# Patient Record
Sex: FEMALE | Race: WHITE | Employment: OTHER | URBAN - METROPOLITAN AREA
[De-identification: names, ages, dates, MRNs, and addresses within clinical notes are randomized per-mention and may not be internally consistent; named-entity substitution may affect disease eponyms.]

---

## 2017-03-06 ENCOUNTER — HOSPITAL ENCOUNTER (OUTPATIENT)
Dept: MRI IMAGING | Age: 67
Discharge: HOME | End: 2017-03-06
Attending: NEUROLOGICAL SURGERY
Payer: MEDICARE

## 2017-03-06 DIAGNOSIS — M48.061 SPINAL STENOSIS, LUMBAR REGION, WITHOUT NEUROGENIC CLAUDICATION: ICD-10-CM

## 2017-03-06 PROCEDURE — 72148 MRI LUMBAR SPINE W/O DYE: CPT

## 2017-06-07 ENCOUNTER — HOSPITAL ENCOUNTER (OUTPATIENT)
Dept: PREADMISSION TESTING | Age: 67
Discharge: HOME | End: 2017-06-07
Payer: MEDICARE

## 2017-06-07 VITALS
RESPIRATION RATE: 20 BRPM | BODY MASS INDEX: 32.49 KG/M2 | HEIGHT: 65 IN | DIASTOLIC BLOOD PRESSURE: 106 MMHG | WEIGHT: 195 LBS | SYSTOLIC BLOOD PRESSURE: 181 MMHG | HEART RATE: 84 BPM | TEMPERATURE: 98.6 F

## 2017-06-07 LAB
ABO + RH BLD: NORMAL
ANION GAP BLD CALC-SCNC: 9 MMOL/L (ref 5–15)
APTT PPP: 28 SEC (ref 22.1–32.5)
ATRIAL RATE: 72 BPM
BASOPHILS # BLD AUTO: 0.1 K/UL (ref 0–0.1)
BASOPHILS # BLD: 1 % (ref 0–1)
BLOOD GROUP ANTIBODIES SERPL: NORMAL
BUN SERPL-MCNC: 17 MG/DL (ref 6–20)
BUN/CREAT SERPL: 17 (ref 12–20)
CALCIUM SERPL-MCNC: 10 MG/DL (ref 8.5–10.1)
CALCULATED P AXIS, ECG09: -26 DEGREES
CALCULATED R AXIS, ECG10: 0 DEGREES
CALCULATED T AXIS, ECG11: -13 DEGREES
CHLORIDE SERPL-SCNC: 105 MMOL/L (ref 97–108)
CO2 SERPL-SCNC: 26 MMOL/L (ref 21–32)
CREAT SERPL-MCNC: 1.01 MG/DL (ref 0.55–1.02)
DIAGNOSIS, 93000: NORMAL
EOSINOPHIL # BLD: 0.2 K/UL (ref 0–0.4)
EOSINOPHIL NFR BLD: 3 % (ref 0–7)
ERYTHROCYTE [DISTWIDTH] IN BLOOD BY AUTOMATED COUNT: 13.7 % (ref 11.5–14.5)
EST. AVERAGE GLUCOSE BLD GHB EST-MCNC: 189 MG/DL
GLUCOSE SERPL-MCNC: 191 MG/DL (ref 65–100)
HBA1C MFR BLD: 8.2 % (ref 4.2–6.3)
HCT VFR BLD AUTO: 44.1 % (ref 35–47)
HGB BLD-MCNC: 14.7 G/DL (ref 11.5–16)
INR PPP: 1 (ref 0.9–1.1)
LYMPHOCYTES # BLD AUTO: 26 % (ref 12–49)
LYMPHOCYTES # BLD: 2 K/UL (ref 0.8–3.5)
MCH RBC QN AUTO: 30.5 PG (ref 26–34)
MCHC RBC AUTO-ENTMCNC: 33.3 G/DL (ref 30–36.5)
MCV RBC AUTO: 91.5 FL (ref 80–99)
MONOCYTES # BLD: 0.7 K/UL (ref 0–1)
MONOCYTES NFR BLD AUTO: 10 % (ref 5–13)
NEUTS SEG # BLD: 4.7 K/UL (ref 1.8–8)
NEUTS SEG NFR BLD AUTO: 60 % (ref 32–75)
P-R INTERVAL, ECG05: 130 MS
PLATELET # BLD AUTO: 327 K/UL (ref 150–400)
POTASSIUM SERPL-SCNC: 3.8 MMOL/L (ref 3.5–5.1)
PROTHROMBIN TIME: 10.4 SEC (ref 9–11.1)
Q-T INTERVAL, ECG07: 404 MS
QRS DURATION, ECG06: 80 MS
QTC CALCULATION (BEZET), ECG08: 442 MS
RBC # BLD AUTO: 4.82 M/UL (ref 3.8–5.2)
SODIUM SERPL-SCNC: 140 MMOL/L (ref 136–145)
SPECIMEN EXP DATE BLD: NORMAL
THERAPEUTIC RANGE,PTTT: NORMAL SECS (ref 58–77)
VENTRICULAR RATE, ECG03: 72 BPM
WBC # BLD AUTO: 7.6 K/UL (ref 3.6–11)

## 2017-06-07 PROCEDURE — 85730 THROMBOPLASTIN TIME PARTIAL: CPT | Performed by: NEUROLOGICAL SURGERY

## 2017-06-07 PROCEDURE — 93005 ELECTROCARDIOGRAM TRACING: CPT

## 2017-06-07 PROCEDURE — 36415 COLL VENOUS BLD VENIPUNCTURE: CPT | Performed by: NEUROLOGICAL SURGERY

## 2017-06-07 PROCEDURE — 83036 HEMOGLOBIN GLYCOSYLATED A1C: CPT | Performed by: NEUROLOGICAL SURGERY

## 2017-06-07 PROCEDURE — 85025 COMPLETE CBC W/AUTO DIFF WBC: CPT | Performed by: NEUROLOGICAL SURGERY

## 2017-06-07 PROCEDURE — 85610 PROTHROMBIN TIME: CPT | Performed by: NEUROLOGICAL SURGERY

## 2017-06-07 PROCEDURE — 80048 BASIC METABOLIC PNL TOTAL CA: CPT | Performed by: NEUROLOGICAL SURGERY

## 2017-06-07 PROCEDURE — 86900 BLOOD TYPING SEROLOGIC ABO: CPT | Performed by: NEUROLOGICAL SURGERY

## 2017-06-07 RX ORDER — GLIPIZIDE 10 MG/1
20 TABLET ORAL DAILY
COMMUNITY
End: 2022-03-04

## 2017-06-07 RX ORDER — MELOXICAM 15 MG/1
15 TABLET ORAL AS NEEDED
COMMUNITY
End: 2017-06-22

## 2017-06-07 RX ORDER — CLONIDINE HYDROCHLORIDE 0.1 MG/1
0.1 TABLET ORAL 2 TIMES DAILY
COMMUNITY

## 2017-06-07 RX ORDER — LOSARTAN POTASSIUM AND HYDROCHLOROTHIAZIDE 25; 100 MG/1; MG/1
1 TABLET ORAL DAILY
COMMUNITY
End: 2022-03-04

## 2017-06-07 RX ORDER — METOPROLOL SUCCINATE 50 MG/1
50 TABLET, EXTENDED RELEASE ORAL DAILY
COMMUNITY

## 2017-06-07 RX ORDER — NAPROXEN SODIUM 220 MG
220 TABLET ORAL 2 TIMES DAILY WITH MEALS
COMMUNITY
End: 2017-06-22

## 2017-06-07 RX ORDER — RANITIDINE 150 MG/1
150 TABLET, FILM COATED ORAL DAILY
COMMUNITY
End: 2022-03-04

## 2017-06-07 NOTE — PERIOP NOTES
PATIENT GIVEN SURGICAL SITE INFECTION FAQ HANDOUT AND HAND WASHING TIP SHEET. PREOP INSTRUCTIONS REVIEWED AND PATIENT VERBALIZES UNDERSTANDING OF INSTRUCTIONS. PATIENT HAS BEEN GIVEN THE OPPORTUNITY TO ASK QUESTIONS. CHG WIPES C INSTRUCTIONS GIVEN TO PT.  PT'S BP WAS FROM 181/106 AS HIGH /121- DR. PIKE PT'S PRIMARY DRTani HAS BEEN CALLED. HIS OFFICE IS TO RETURN A CALL TO PAT.  AS TO WHAT THEY WANT TO DO.

## 2017-06-08 LAB
BACTERIA SPEC CULT: ABNORMAL
BACTERIA SPEC CULT: ABNORMAL
SERVICE CMNT-IMP: ABNORMAL

## 2017-06-08 NOTE — PERIOP NOTES
PT TO GO SEE DR. PIKE THIS AFTERNOON  REGARDING BP.  SPOKE DONNIE BOBO REGARDING PT'S BP AND A1C OF 8.2

## 2017-06-17 ENCOUNTER — ANESTHESIA EVENT (OUTPATIENT)
Dept: SURGERY | Age: 67
DRG: 460 | End: 2017-06-17
Payer: MEDICARE

## 2017-06-19 ENCOUNTER — ANESTHESIA (OUTPATIENT)
Dept: SURGERY | Age: 67
DRG: 460 | End: 2017-06-19
Payer: MEDICARE

## 2017-06-19 ENCOUNTER — HOSPITAL ENCOUNTER (INPATIENT)
Age: 67
LOS: 3 days | Discharge: NURSING, REHAB OR LONG-TERM CARE | DRG: 460 | End: 2017-06-22
Attending: NEUROLOGICAL SURGERY | Admitting: NEUROLOGICAL SURGERY
Payer: MEDICARE

## 2017-06-19 ENCOUNTER — APPOINTMENT (OUTPATIENT)
Dept: GENERAL RADIOLOGY | Age: 67
DRG: 460 | End: 2017-06-19
Attending: NEUROLOGICAL SURGERY
Payer: MEDICARE

## 2017-06-19 ENCOUNTER — APPOINTMENT (OUTPATIENT)
Dept: GENERAL RADIOLOGY | Age: 67
DRG: 460 | End: 2017-06-19
Attending: NURSE PRACTITIONER
Payer: MEDICARE

## 2017-06-19 PROBLEM — M53.2X6 SPINAL INSTABILITIES OF LUMBAR REGION: Status: ACTIVE | Noted: 2017-06-19

## 2017-06-19 LAB
ERYTHROCYTE [DISTWIDTH] IN BLOOD BY AUTOMATED COUNT: 13.8 % (ref 11.5–14.5)
GLUCOSE BLD STRIP.AUTO-MCNC: 166 MG/DL (ref 65–100)
GLUCOSE BLD STRIP.AUTO-MCNC: 166 MG/DL (ref 65–100)
GLUCOSE BLD STRIP.AUTO-MCNC: 270 MG/DL (ref 65–100)
HCT VFR BLD AUTO: 39.6 % (ref 35–47)
HGB BLD-MCNC: 13 G/DL (ref 11.5–16)
MCH RBC QN AUTO: 30.3 PG (ref 26–34)
MCHC RBC AUTO-ENTMCNC: 32.8 G/DL (ref 30–36.5)
MCV RBC AUTO: 92.3 FL (ref 80–99)
PLATELET # BLD AUTO: 342 K/UL (ref 150–400)
RBC # BLD AUTO: 4.29 M/UL (ref 3.8–5.2)
SERVICE CMNT-IMP: ABNORMAL
WBC # BLD AUTO: 15.2 K/UL (ref 3.6–11)

## 2017-06-19 PROCEDURE — 74011250636 HC RX REV CODE- 250/636: Performed by: NURSE PRACTITIONER

## 2017-06-19 PROCEDURE — 74011250636 HC RX REV CODE- 250/636: Performed by: NEUROLOGICAL SURGERY

## 2017-06-19 PROCEDURE — 0SG00AJ FUSION OF LUMBAR VERTEBRAL JOINT WITH INTERBODY FUSION DEVICE, POSTERIOR APPROACH, ANTERIOR COLUMN, OPEN APPROACH: ICD-10-PCS | Performed by: NEUROLOGICAL SURGERY

## 2017-06-19 PROCEDURE — 74011000250 HC RX REV CODE- 250

## 2017-06-19 PROCEDURE — 77030012890

## 2017-06-19 PROCEDURE — 74011000250 HC RX REV CODE- 250: Performed by: NEUROLOGICAL SURGERY

## 2017-06-19 PROCEDURE — 01NB0ZZ RELEASE LUMBAR NERVE, OPEN APPROACH: ICD-10-PCS | Performed by: NEUROLOGICAL SURGERY

## 2017-06-19 PROCEDURE — C1713 ANCHOR/SCREW BN/BN,TIS/BN: HCPCS | Performed by: NEUROLOGICAL SURGERY

## 2017-06-19 PROCEDURE — 74000 XR ABD (KUB): CPT

## 2017-06-19 PROCEDURE — 00QT0ZZ REPAIR SPINAL MENINGES, OPEN APPROACH: ICD-10-PCS | Performed by: NEUROLOGICAL SURGERY

## 2017-06-19 PROCEDURE — 74011000272 HC RX REV CODE- 272: Performed by: NEUROLOGICAL SURGERY

## 2017-06-19 PROCEDURE — 77030013079 HC BLNKT BAIR HGGR 3M -A: Performed by: ANESTHESIOLOGY

## 2017-06-19 PROCEDURE — 77030026438 HC STYL ET INTUB CARD -A: Performed by: ANESTHESIOLOGY

## 2017-06-19 PROCEDURE — 36415 COLL VENOUS BLD VENIPUNCTURE: CPT | Performed by: NEUROLOGICAL SURGERY

## 2017-06-19 PROCEDURE — 77030012406 HC DRN WND PENRS BARD -A: Performed by: NEUROLOGICAL SURGERY

## 2017-06-19 PROCEDURE — 77030002933 HC SUT MCRYL J&J -A: Performed by: NEUROLOGICAL SURGERY

## 2017-06-19 PROCEDURE — 77030004391 HC BUR FLUT MEDT -C: Performed by: NEUROLOGICAL SURGERY

## 2017-06-19 PROCEDURE — 74011000250 HC RX REV CODE- 250: Performed by: NURSE PRACTITIONER

## 2017-06-19 PROCEDURE — 77030020782 HC GWN BAIR PAWS FLX 3M -B

## 2017-06-19 PROCEDURE — 77030012035 HC APPL SEAL MICROMYST KT INLC -C: Performed by: NEUROLOGICAL SURGERY

## 2017-06-19 PROCEDURE — 77030008684 HC TU ET CUF COVD -B: Performed by: ANESTHESIOLOGY

## 2017-06-19 PROCEDURE — 65270000029 HC RM PRIVATE

## 2017-06-19 PROCEDURE — 77030034850: Performed by: NEUROLOGICAL SURGERY

## 2017-06-19 PROCEDURE — 82962 GLUCOSE BLOOD TEST: CPT

## 2017-06-19 PROCEDURE — 76060000039 HC ANESTHESIA 4 TO 4.5 HR: Performed by: NEUROLOGICAL SURGERY

## 2017-06-19 PROCEDURE — 77030003194 HC GRFT RECOMB BN MEDT -I1: Performed by: NEUROLOGICAL SURGERY

## 2017-06-19 PROCEDURE — 74011250637 HC RX REV CODE- 250/637: Performed by: NEUROLOGICAL SURGERY

## 2017-06-19 PROCEDURE — 77030013951 HC SEAL TISS ADH DURASL KT INLC -G1: Performed by: NEUROLOGICAL SURGERY

## 2017-06-19 PROCEDURE — 74011250636 HC RX REV CODE- 250/636: Performed by: ANESTHESIOLOGY

## 2017-06-19 PROCEDURE — 77030011836 HC SUT GORTX WLGO -A: Performed by: NEUROLOGICAL SURGERY

## 2017-06-19 PROCEDURE — 77030012602 HC SPNG PTTY NEUR J&J -B: Performed by: NEUROLOGICAL SURGERY

## 2017-06-19 PROCEDURE — 76010000175 HC OR TIME 4 TO 4.5 HR INTENSV-TIER 1: Performed by: NEUROLOGICAL SURGERY

## 2017-06-19 PROCEDURE — 76210000017 HC OR PH I REC 1.5 TO 2 HR: Performed by: NEUROLOGICAL SURGERY

## 2017-06-19 PROCEDURE — 77030034475 HC MISC IMPL SPN: Performed by: NEUROLOGICAL SURGERY

## 2017-06-19 PROCEDURE — 74011250636 HC RX REV CODE- 250/636

## 2017-06-19 PROCEDURE — 77030003029 HC SUT VCRL J&J -B: Performed by: NEUROLOGICAL SURGERY

## 2017-06-19 PROCEDURE — 77030014647 HC SEAL FBRN TISSL BAXT -D: Performed by: NEUROLOGICAL SURGERY

## 2017-06-19 PROCEDURE — 77030032490 HC SLV COMPR SCD KNE COVD -B: Performed by: NEUROLOGICAL SURGERY

## 2017-06-19 PROCEDURE — 0ST20ZZ RESECTION OF LUMBAR VERTEBRAL DISC, OPEN APPROACH: ICD-10-PCS | Performed by: NEUROLOGICAL SURGERY

## 2017-06-19 PROCEDURE — 85027 COMPLETE CBC AUTOMATED: CPT | Performed by: NEUROLOGICAL SURGERY

## 2017-06-19 PROCEDURE — 74011000250 HC RX REV CODE- 250: Performed by: ANESTHESIOLOGY

## 2017-06-19 PROCEDURE — 77030010813: Performed by: NEUROLOGICAL SURGERY

## 2017-06-19 PROCEDURE — 77030029099 HC BN WAX SSPC -A: Performed by: NEUROLOGICAL SURGERY

## 2017-06-19 DEVICE — BONE GRAFT KIT 7510200 INFUSE SMALL
Type: IMPLANTABLE DEVICE | Site: SPINE LUMBAR | Status: FUNCTIONAL
Brand: INFUSE® BONE GRAFT

## 2017-06-19 DEVICE — ROD 1553201035 5.5 TI CP4 NS CURV 35MM
Type: IMPLANTABLE DEVICE | Site: SPINE LUMBAR | Status: FUNCTIONAL
Brand: CD HORIZON® SPINAL SYSTEM

## 2017-06-19 RX ORDER — DEXTROSE 50 % IN WATER (D50W) INTRAVENOUS SYRINGE
12.5-25 AS NEEDED
Status: DISCONTINUED | OUTPATIENT
Start: 2017-06-19 | End: 2017-06-19 | Stop reason: CLARIF

## 2017-06-19 RX ORDER — SODIUM CHLORIDE, SODIUM LACTATE, POTASSIUM CHLORIDE, CALCIUM CHLORIDE 600; 310; 30; 20 MG/100ML; MG/100ML; MG/100ML; MG/100ML
INJECTION, SOLUTION INTRAVENOUS
Status: DISCONTINUED | OUTPATIENT
Start: 2017-06-19 | End: 2017-06-19 | Stop reason: HOSPADM

## 2017-06-19 RX ORDER — SODIUM CHLORIDE 9 MG/ML
1000 INJECTION, SOLUTION INTRAVENOUS CONTINUOUS
Status: DISCONTINUED | OUTPATIENT
Start: 2017-06-19 | End: 2017-06-19 | Stop reason: HOSPADM

## 2017-06-19 RX ORDER — LIDOCAINE HYDROCHLORIDE 10 MG/ML
0.1 INJECTION, SOLUTION EPIDURAL; INFILTRATION; INTRACAUDAL; PERINEURAL AS NEEDED
Status: DISCONTINUED | OUTPATIENT
Start: 2017-06-19 | End: 2017-06-19 | Stop reason: HOSPADM

## 2017-06-19 RX ORDER — ACETAMINOPHEN 325 MG/1
650 TABLET ORAL
Status: DISCONTINUED | OUTPATIENT
Start: 2017-06-19 | End: 2017-06-22 | Stop reason: HOSPADM

## 2017-06-19 RX ORDER — MIDAZOLAM HYDROCHLORIDE 1 MG/ML
INJECTION, SOLUTION INTRAMUSCULAR; INTRAVENOUS AS NEEDED
Status: DISCONTINUED | OUTPATIENT
Start: 2017-06-19 | End: 2017-06-19 | Stop reason: HOSPADM

## 2017-06-19 RX ORDER — SODIUM CHLORIDE, SODIUM LACTATE, POTASSIUM CHLORIDE, CALCIUM CHLORIDE 600; 310; 30; 20 MG/100ML; MG/100ML; MG/100ML; MG/100ML
25 INJECTION, SOLUTION INTRAVENOUS CONTINUOUS
Status: DISCONTINUED | OUTPATIENT
Start: 2017-06-19 | End: 2017-06-19 | Stop reason: HOSPADM

## 2017-06-19 RX ORDER — LIDOCAINE HYDROCHLORIDE 20 MG/ML
INJECTION, SOLUTION EPIDURAL; INFILTRATION; INTRACAUDAL; PERINEURAL AS NEEDED
Status: DISCONTINUED | OUTPATIENT
Start: 2017-06-19 | End: 2017-06-19 | Stop reason: HOSPADM

## 2017-06-19 RX ORDER — SODIUM CHLORIDE 0.9 % (FLUSH) 0.9 %
5-10 SYRINGE (ML) INJECTION EVERY 8 HOURS
Status: DISCONTINUED | OUTPATIENT
Start: 2017-06-19 | End: 2017-06-19 | Stop reason: HOSPADM

## 2017-06-19 RX ORDER — HYDRALAZINE HYDROCHLORIDE 20 MG/ML
10 INJECTION INTRAMUSCULAR; INTRAVENOUS
Status: DISCONTINUED | OUTPATIENT
Start: 2017-06-19 | End: 2017-06-22 | Stop reason: HOSPADM

## 2017-06-19 RX ORDER — METOPROLOL SUCCINATE 50 MG/1
50 TABLET, EXTENDED RELEASE ORAL DAILY
Status: DISCONTINUED | OUTPATIENT
Start: 2017-06-20 | End: 2017-06-22 | Stop reason: HOSPADM

## 2017-06-19 RX ORDER — ONDANSETRON 2 MG/ML
4 INJECTION INTRAMUSCULAR; INTRAVENOUS AS NEEDED
Status: DISCONTINUED | OUTPATIENT
Start: 2017-06-19 | End: 2017-06-19 | Stop reason: HOSPADM

## 2017-06-19 RX ORDER — CEFAZOLIN SODIUM IN 0.9 % NACL 2 G/50 ML
2 INTRAVENOUS SOLUTION, PIGGYBACK (ML) INTRAVENOUS
Status: COMPLETED | OUTPATIENT
Start: 2017-06-19 | End: 2017-06-19

## 2017-06-19 RX ORDER — SODIUM CHLORIDE 0.9 % (FLUSH) 0.9 %
5-10 SYRINGE (ML) INJECTION AS NEEDED
Status: DISCONTINUED | OUTPATIENT
Start: 2017-06-19 | End: 2017-06-22 | Stop reason: HOSPADM

## 2017-06-19 RX ORDER — HYDROMORPHONE HYDROCHLORIDE 2 MG/ML
INJECTION, SOLUTION INTRAMUSCULAR; INTRAVENOUS; SUBCUTANEOUS AS NEEDED
Status: DISCONTINUED | OUTPATIENT
Start: 2017-06-19 | End: 2017-06-19 | Stop reason: HOSPADM

## 2017-06-19 RX ORDER — MIDAZOLAM HYDROCHLORIDE 1 MG/ML
0.5 INJECTION, SOLUTION INTRAMUSCULAR; INTRAVENOUS
Status: DISCONTINUED | OUTPATIENT
Start: 2017-06-19 | End: 2017-06-19 | Stop reason: HOSPADM

## 2017-06-19 RX ORDER — PROPOFOL 10 MG/ML
INJECTION, EMULSION INTRAVENOUS AS NEEDED
Status: DISCONTINUED | OUTPATIENT
Start: 2017-06-19 | End: 2017-06-19 | Stop reason: HOSPADM

## 2017-06-19 RX ORDER — PHENYLEPHRINE HCL IN 0.9% NACL 0.4MG/10ML
SYRINGE (ML) INTRAVENOUS AS NEEDED
Status: DISCONTINUED | OUTPATIENT
Start: 2017-06-19 | End: 2017-06-19 | Stop reason: HOSPADM

## 2017-06-19 RX ORDER — ENALAPRILAT 1.25 MG/ML
1.25 INJECTION INTRAVENOUS
Status: DISCONTINUED | OUTPATIENT
Start: 2017-06-19 | End: 2017-06-19 | Stop reason: CLARIF

## 2017-06-19 RX ORDER — DEXTROSE, SODIUM CHLORIDE, SODIUM LACTATE, POTASSIUM CHLORIDE, AND CALCIUM CHLORIDE 5; .6; .31; .03; .02 G/100ML; G/100ML; G/100ML; G/100ML; G/100ML
25 INJECTION, SOLUTION INTRAVENOUS CONTINUOUS
Status: DISCONTINUED | OUTPATIENT
Start: 2017-06-19 | End: 2017-06-19 | Stop reason: HOSPADM

## 2017-06-19 RX ORDER — SODIUM CHLORIDE 9 MG/ML
25 INJECTION, SOLUTION INTRAVENOUS CONTINUOUS
Status: DISCONTINUED | OUTPATIENT
Start: 2017-06-19 | End: 2017-06-19 | Stop reason: HOSPADM

## 2017-06-19 RX ORDER — DIPHENHYDRAMINE HCL 25 MG
25 CAPSULE ORAL
Status: DISCONTINUED | OUTPATIENT
Start: 2017-06-19 | End: 2017-06-22 | Stop reason: HOSPADM

## 2017-06-19 RX ORDER — OXYCODONE AND ACETAMINOPHEN 10; 325 MG/1; MG/1
1 TABLET ORAL
Status: DISCONTINUED | OUTPATIENT
Start: 2017-06-19 | End: 2017-06-22 | Stop reason: HOSPADM

## 2017-06-19 RX ORDER — FENTANYL CITRATE 50 UG/ML
INJECTION, SOLUTION INTRAMUSCULAR; INTRAVENOUS AS NEEDED
Status: DISCONTINUED | OUTPATIENT
Start: 2017-06-19 | End: 2017-06-19 | Stop reason: HOSPADM

## 2017-06-19 RX ORDER — ONDANSETRON 2 MG/ML
INJECTION INTRAMUSCULAR; INTRAVENOUS AS NEEDED
Status: DISCONTINUED | OUTPATIENT
Start: 2017-06-19 | End: 2017-06-19 | Stop reason: HOSPADM

## 2017-06-19 RX ORDER — SODIUM CHLORIDE 9 MG/ML
100 INJECTION, SOLUTION INTRAVENOUS CONTINUOUS
Status: DISCONTINUED | OUTPATIENT
Start: 2017-06-19 | End: 2017-06-21

## 2017-06-19 RX ORDER — CLONIDINE HYDROCHLORIDE 0.1 MG/1
0.1 TABLET ORAL EVERY 12 HOURS
Status: DISCONTINUED | OUTPATIENT
Start: 2017-06-19 | End: 2017-06-22 | Stop reason: HOSPADM

## 2017-06-19 RX ORDER — HYDROMORPHONE HYDROCHLORIDE 1 MG/ML
0.2 INJECTION, SOLUTION INTRAMUSCULAR; INTRAVENOUS; SUBCUTANEOUS
Status: DISCONTINUED | OUTPATIENT
Start: 2017-06-19 | End: 2017-06-19 | Stop reason: HOSPADM

## 2017-06-19 RX ORDER — DOCUSATE SODIUM 100 MG/1
100 CAPSULE, LIQUID FILLED ORAL 2 TIMES DAILY
Status: DISCONTINUED | OUTPATIENT
Start: 2017-06-19 | End: 2017-06-22 | Stop reason: HOSPADM

## 2017-06-19 RX ORDER — ONDANSETRON 2 MG/ML
4 INJECTION INTRAMUSCULAR; INTRAVENOUS
Status: DISCONTINUED | OUTPATIENT
Start: 2017-06-19 | End: 2017-06-22 | Stop reason: HOSPADM

## 2017-06-19 RX ORDER — MORPHINE SULFATE 10 MG/ML
2 INJECTION, SOLUTION INTRAMUSCULAR; INTRAVENOUS
Status: DISCONTINUED | OUTPATIENT
Start: 2017-06-19 | End: 2017-06-19 | Stop reason: HOSPADM

## 2017-06-19 RX ORDER — INSULIN LISPRO 100 [IU]/ML
INJECTION, SOLUTION INTRAVENOUS; SUBCUTANEOUS
Status: DISCONTINUED | OUTPATIENT
Start: 2017-06-20 | End: 2017-06-22 | Stop reason: HOSPADM

## 2017-06-19 RX ORDER — FAMOTIDINE 20 MG/1
40 TABLET, FILM COATED ORAL
Status: DISCONTINUED | OUTPATIENT
Start: 2017-06-19 | End: 2017-06-22 | Stop reason: HOSPADM

## 2017-06-19 RX ORDER — MORPHINE SULFATE 5 MG/ML
INJECTION, SOLUTION INTRAVENOUS
Status: DISCONTINUED | OUTPATIENT
Start: 2017-06-19 | End: 2017-06-20

## 2017-06-19 RX ORDER — FENTANYL CITRATE 50 UG/ML
50 INJECTION, SOLUTION INTRAMUSCULAR; INTRAVENOUS AS NEEDED
Status: DISCONTINUED | OUTPATIENT
Start: 2017-06-19 | End: 2017-06-19 | Stop reason: HOSPADM

## 2017-06-19 RX ORDER — FENTANYL CITRATE 50 UG/ML
25 INJECTION, SOLUTION INTRAMUSCULAR; INTRAVENOUS
Status: COMPLETED | OUTPATIENT
Start: 2017-06-19 | End: 2017-06-19

## 2017-06-19 RX ORDER — MAGNESIUM SULFATE 100 %
4 CRYSTALS MISCELLANEOUS AS NEEDED
Status: DISCONTINUED | OUTPATIENT
Start: 2017-06-19 | End: 2017-06-22 | Stop reason: HOSPADM

## 2017-06-19 RX ORDER — HYDROMORPHONE HYDROCHLORIDE 1 MG/ML
1 INJECTION, SOLUTION INTRAMUSCULAR; INTRAVENOUS; SUBCUTANEOUS
Status: DISCONTINUED | OUTPATIENT
Start: 2017-06-19 | End: 2017-06-22 | Stop reason: HOSPADM

## 2017-06-19 RX ORDER — MIDAZOLAM HYDROCHLORIDE 1 MG/ML
1 INJECTION, SOLUTION INTRAMUSCULAR; INTRAVENOUS AS NEEDED
Status: DISCONTINUED | OUTPATIENT
Start: 2017-06-19 | End: 2017-06-19 | Stop reason: HOSPADM

## 2017-06-19 RX ORDER — DIPHENHYDRAMINE HYDROCHLORIDE 50 MG/ML
12.5 INJECTION, SOLUTION INTRAMUSCULAR; INTRAVENOUS AS NEEDED
Status: DISCONTINUED | OUTPATIENT
Start: 2017-06-19 | End: 2017-06-19 | Stop reason: HOSPADM

## 2017-06-19 RX ORDER — DIAZEPAM 5 MG/1
5 TABLET ORAL
Status: DISCONTINUED | OUTPATIENT
Start: 2017-06-19 | End: 2017-06-22 | Stop reason: HOSPADM

## 2017-06-19 RX ORDER — SUCCINYLCHOLINE CHLORIDE 20 MG/ML
INJECTION INTRAMUSCULAR; INTRAVENOUS AS NEEDED
Status: DISCONTINUED | OUTPATIENT
Start: 2017-06-19 | End: 2017-06-19 | Stop reason: HOSPADM

## 2017-06-19 RX ORDER — CEFAZOLIN SODIUM IN 0.9 % NACL 2 G/50 ML
2 INTRAVENOUS SOLUTION, PIGGYBACK (ML) INTRAVENOUS EVERY 8 HOURS
Status: COMPLETED | OUTPATIENT
Start: 2017-06-19 | End: 2017-06-20

## 2017-06-19 RX ORDER — GLYCOPYRROLATE 0.2 MG/ML
INJECTION INTRAMUSCULAR; INTRAVENOUS AS NEEDED
Status: DISCONTINUED | OUTPATIENT
Start: 2017-06-19 | End: 2017-06-19 | Stop reason: HOSPADM

## 2017-06-19 RX ORDER — CLONIDINE HYDROCHLORIDE 0.1 MG/1
0.1 TABLET ORAL DAILY
Status: DISCONTINUED | OUTPATIENT
Start: 2017-06-20 | End: 2017-06-19 | Stop reason: CLARIF

## 2017-06-19 RX ORDER — SODIUM CHLORIDE 0.9 % (FLUSH) 0.9 %
5-10 SYRINGE (ML) INJECTION EVERY 8 HOURS
Status: DISCONTINUED | OUTPATIENT
Start: 2017-06-19 | End: 2017-06-22 | Stop reason: HOSPADM

## 2017-06-19 RX ORDER — SODIUM CHLORIDE 0.9 % (FLUSH) 0.9 %
5-10 SYRINGE (ML) INJECTION AS NEEDED
Status: DISCONTINUED | OUTPATIENT
Start: 2017-06-19 | End: 2017-06-19 | Stop reason: HOSPADM

## 2017-06-19 RX ORDER — NEOSTIGMINE METHYLSULFATE 1 MG/ML
INJECTION INTRAVENOUS AS NEEDED
Status: DISCONTINUED | OUTPATIENT
Start: 2017-06-19 | End: 2017-06-19 | Stop reason: HOSPADM

## 2017-06-19 RX ORDER — CYCLOBENZAPRINE HCL 10 MG
10 TABLET ORAL
Status: DISCONTINUED | OUTPATIENT
Start: 2017-06-19 | End: 2017-06-22 | Stop reason: HOSPADM

## 2017-06-19 RX ORDER — ROCURONIUM BROMIDE 10 MG/ML
INJECTION, SOLUTION INTRAVENOUS AS NEEDED
Status: DISCONTINUED | OUTPATIENT
Start: 2017-06-19 | End: 2017-06-19 | Stop reason: HOSPADM

## 2017-06-19 RX ORDER — GLIPIZIDE 5 MG/1
20 TABLET ORAL
Status: DISCONTINUED | OUTPATIENT
Start: 2017-06-20 | End: 2017-06-22 | Stop reason: HOSPADM

## 2017-06-19 RX ADMIN — HYDRALAZINE HYDROCHLORIDE 10 MG: 20 INJECTION INTRAMUSCULAR; INTRAVENOUS at 19:02

## 2017-06-19 RX ADMIN — HYDROMORPHONE HYDROCHLORIDE 0.2 MG: 1 INJECTION, SOLUTION INTRAMUSCULAR; INTRAVENOUS; SUBCUTANEOUS at 16:39

## 2017-06-19 RX ADMIN — CLONIDINE HYDROCHLORIDE 0.1 MG: 0.1 TABLET ORAL at 20:22

## 2017-06-19 RX ADMIN — CEFAZOLIN 2 G: 1 INJECTION, POWDER, FOR SOLUTION INTRAMUSCULAR; INTRAVENOUS; PARENTERAL at 23:52

## 2017-06-19 RX ADMIN — PROCHLORPERAZINE EDISYLATE 5 MG: 5 INJECTION INTRAMUSCULAR; INTRAVENOUS at 21:16

## 2017-06-19 RX ADMIN — MIDAZOLAM HYDROCHLORIDE 2 MG: 1 INJECTION, SOLUTION INTRAMUSCULAR; INTRAVENOUS at 10:35

## 2017-06-19 RX ADMIN — FENTANYL CITRATE 25 MCG: 50 INJECTION, SOLUTION INTRAMUSCULAR; INTRAVENOUS at 16:38

## 2017-06-19 RX ADMIN — PROPOFOL 50 MG: 10 INJECTION, EMULSION INTRAVENOUS at 11:14

## 2017-06-19 RX ADMIN — FENTANYL CITRATE 100 MCG: 50 INJECTION, SOLUTION INTRAMUSCULAR; INTRAVENOUS at 11:08

## 2017-06-19 RX ADMIN — FENTANYL CITRATE 50 MCG: 50 INJECTION, SOLUTION INTRAMUSCULAR; INTRAVENOUS at 13:07

## 2017-06-19 RX ADMIN — FAMOTIDINE 40 MG: 20 TABLET ORAL at 20:22

## 2017-06-19 RX ADMIN — Medication 40 MCG: at 12:18

## 2017-06-19 RX ADMIN — ONDANSETRON 4 MG: 2 INJECTION INTRAMUSCULAR; INTRAVENOUS at 19:03

## 2017-06-19 RX ADMIN — GLYCOPYRROLATE 0.3 MG: 0.2 INJECTION INTRAMUSCULAR; INTRAVENOUS at 14:55

## 2017-06-19 RX ADMIN — FENTANYL CITRATE 100 MCG: 50 INJECTION, SOLUTION INTRAMUSCULAR; INTRAVENOUS at 13:00

## 2017-06-19 RX ADMIN — Medication 10 ML: at 21:19

## 2017-06-19 RX ADMIN — FENTANYL CITRATE 50 MCG: 50 INJECTION, SOLUTION INTRAMUSCULAR; INTRAVENOUS at 11:43

## 2017-06-19 RX ADMIN — SODIUM CHLORIDE, SODIUM LACTATE, POTASSIUM CHLORIDE, CALCIUM CHLORIDE: 600; 310; 30; 20 INJECTION, SOLUTION INTRAVENOUS at 11:03

## 2017-06-19 RX ADMIN — ONDANSETRON 4 MG: 2 INJECTION INTRAMUSCULAR; INTRAVENOUS at 11:08

## 2017-06-19 RX ADMIN — Medication 10 ML: at 19:04

## 2017-06-19 RX ADMIN — FENTANYL CITRATE 25 MCG: 50 INJECTION, SOLUTION INTRAMUSCULAR; INTRAVENOUS at 16:32

## 2017-06-19 RX ADMIN — SODIUM CHLORIDE 125 ML/HR: 900 INJECTION, SOLUTION INTRAVENOUS at 17:00

## 2017-06-19 RX ADMIN — FENTANYL CITRATE 50 MCG: 50 INJECTION, SOLUTION INTRAMUSCULAR; INTRAVENOUS at 13:13

## 2017-06-19 RX ADMIN — HYDROMORPHONE HYDROCHLORIDE 1 MG: 2 INJECTION, SOLUTION INTRAMUSCULAR; INTRAVENOUS; SUBCUTANEOUS at 14:58

## 2017-06-19 RX ADMIN — ROCURONIUM BROMIDE 5 MG: 10 INJECTION, SOLUTION INTRAVENOUS at 11:11

## 2017-06-19 RX ADMIN — FENTANYL CITRATE 25 MCG: 50 INJECTION, SOLUTION INTRAMUSCULAR; INTRAVENOUS at 16:22

## 2017-06-19 RX ADMIN — ROCURONIUM BROMIDE 20 MG: 10 INJECTION, SOLUTION INTRAVENOUS at 13:34

## 2017-06-19 RX ADMIN — NEOSTIGMINE METHYLSULFATE 3 MG: 1 INJECTION INTRAVENOUS at 14:55

## 2017-06-19 RX ADMIN — Medication: at 15:50

## 2017-06-19 RX ADMIN — PROPOFOL 150 MG: 10 INJECTION, EMULSION INTRAVENOUS at 11:11

## 2017-06-19 RX ADMIN — FENTANYL CITRATE 50 MCG: 50 INJECTION, SOLUTION INTRAMUSCULAR; INTRAVENOUS at 11:32

## 2017-06-19 RX ADMIN — LIDOCAINE HYDROCHLORIDE 100 MG: 20 INJECTION, SOLUTION EPIDURAL; INFILTRATION; INTRACAUDAL; PERINEURAL at 11:11

## 2017-06-19 RX ADMIN — CEFAZOLIN 2 G: 1 INJECTION, POWDER, FOR SOLUTION INTRAMUSCULAR; INTRAVENOUS; PARENTERAL at 15:00

## 2017-06-19 RX ADMIN — HYDROMORPHONE HYDROCHLORIDE 0.2 MG: 1 INJECTION, SOLUTION INTRAMUSCULAR; INTRAVENOUS; SUBCUTANEOUS at 16:24

## 2017-06-19 RX ADMIN — FENTANYL CITRATE 50 MCG: 50 INJECTION, SOLUTION INTRAMUSCULAR; INTRAVENOUS at 14:00

## 2017-06-19 RX ADMIN — FENTANYL CITRATE 50 MCG: 50 INJECTION, SOLUTION INTRAMUSCULAR; INTRAVENOUS at 15:05

## 2017-06-19 RX ADMIN — CEFAZOLIN 2 G: 1 INJECTION, POWDER, FOR SOLUTION INTRAMUSCULAR; INTRAVENOUS; PARENTERAL at 11:08

## 2017-06-19 RX ADMIN — HYDROMORPHONE HYDROCHLORIDE 1 MG: 2 INJECTION, SOLUTION INTRAMUSCULAR; INTRAVENOUS; SUBCUTANEOUS at 15:12

## 2017-06-19 RX ADMIN — LIDOCAINE HYDROCHLORIDE 0.1 ML: 10 INJECTION, SOLUTION EPIDURAL; INFILTRATION; INTRACAUDAL; PERINEURAL at 10:13

## 2017-06-19 RX ADMIN — ROCURONIUM BROMIDE 10 MG: 10 INJECTION, SOLUTION INTRAVENOUS at 12:00

## 2017-06-19 RX ADMIN — SUCCINYLCHOLINE CHLORIDE 160 MG: 20 INJECTION INTRAMUSCULAR; INTRAVENOUS at 11:11

## 2017-06-19 RX ADMIN — PROPOFOL 20 MG: 10 INJECTION, EMULSION INTRAVENOUS at 13:00

## 2017-06-19 RX ADMIN — FENTANYL CITRATE 25 MCG: 50 INJECTION, SOLUTION INTRAMUSCULAR; INTRAVENOUS at 16:27

## 2017-06-19 RX ADMIN — SODIUM CHLORIDE, SODIUM LACTATE, POTASSIUM CHLORIDE, AND CALCIUM CHLORIDE 25 ML/HR: 600; 310; 30; 20 INJECTION, SOLUTION INTRAVENOUS at 10:14

## 2017-06-19 RX ADMIN — ROCURONIUM BROMIDE 45 MG: 10 INJECTION, SOLUTION INTRAVENOUS at 11:16

## 2017-06-19 RX ADMIN — ONDANSETRON 4 MG: 2 INJECTION INTRAMUSCULAR; INTRAVENOUS at 14:58

## 2017-06-19 RX ADMIN — FENTANYL CITRATE 50 MCG: 50 INJECTION, SOLUTION INTRAMUSCULAR; INTRAVENOUS at 10:15

## 2017-06-19 RX ADMIN — MIDAZOLAM HYDROCHLORIDE 2 MG: 1 INJECTION, SOLUTION INTRAMUSCULAR; INTRAVENOUS at 11:08

## 2017-06-19 RX ADMIN — Medication 40 MCG: at 12:03

## 2017-06-19 NOTE — CONSULTS
After 7pm please call  for physician on call    Hospitalist Consult Note         NAME: Keyur Lopez   :  1950   MRN:  [id number]     Date/Time:  2017 7:58 PM    Patient PCP: Radha Elizalde MD    I have been asked to see this patient by the attending, Dr. Jordan Angel  , for advice/opinion re: post operative management of hypertension   ________________________________________________________________________   Assessment/Plan:  1. HTN- reports control with \"white coat\" spikes  - Restart home medications including Hyzaar, metoprolol, clonidine  - Pain control   - may need echo if unable to control   2. DM-2 poor control Hbg A1C 8.5  - Continue glipizide  - POC glucose with Regular coverage insulin  - check Hgb A1C-  - DT consult to evaluate home regiment   3. UC- currently no flare  - Monitor   4. POD #0 s/p  L4-5 laminectomy with instrumented fusion, PLIF allograft and pedicle screws for L4-5 lumbar stenosis  - PT/OT  - post op activity as per neurosurgery   5. HX of vertigo with post op dizziness/nausea  - IV hydration   - Epley maneuver. Subjective:   CHIEF COMPLAINT:  Post operative management of hypertension     HISTORY OF PRESENT ILLNESS:     Keyur Lopez  is a 78 y.o.  female with PMH of DM, HTN, GERD, UC vertigo s/p L4-5 laminectomy with instrumented fusion, PLIF allograft and pedicle screws for L4-5 lumbar stenosis by Dr. Dillon Sung whom presents with complaint noted above. This patient has longstanding HTN without reported complications of CAD-MI, CVA, CKD . She is followed by her PCP and states BP is usually 130- 140 but endorses \" white coat/ stress hypertension. Immediately post op her BP was found to be 201/109. She was restarted on her home regiment with appropriate normalization of her BP. She currently denies chest pain, SOB, abdominal pain or change in loc.  She endorses vertigo, headache, nausea, vomiting and back pain the hospitalist team has been consulted to assist with post operative management of hypertension. The team appreciates the opportunity to participate in this patient's care. Past Medical History:   Diagnosis Date    Arthritis     Chronic pain     Diabetes ([facility] [address]     GERD (gastroesophageal reflux disease)     Hypertension     Ill-defined condition     ucerative colitis    Ulcerative colitis ([facility] [address]     Vertigo       Past Surgical History:   Procedure Laterality Date    HX GI      colonoscopy    HX ORTHOPAEDIC Right 2014    FOOT    HX OTHER SURGICAL      finger surgery, foot surgery     Social History   Substance Use Topics    Smoking status: Never Smoker    Smokeless tobacco: Not on file    Alcohol use No      Family History   Problem Relation Age of Onset    Heart Disease Mother     Diabetes Mother     Diabetes Father     Hearing Impairment Brother       No Known Allergies     Prior to Admission medications    Medication Sig Start Date End Date Taking? Authorizing Provider   glipiZIDE (GLUCOTROL) 10 mg tablet Take 20 mg by mouth daily. Yes Historical Provider   cloNIDine HCl (CATAPRES) 0.1 mg tablet Take 0.1 mg by mouth two (2) times a day. Yes Historical Provider   metoprolol succinate (TOPROL-XL) 50 mg XL tablet Take 50 mg by mouth daily. Yes Historical Provider   losartan-hydroCHLOROthiazide (HYZAAR) 100-25 mg per tablet Take 1 Tab by mouth daily. Yes Historical Provider   raNITIdine (ZANTAC) 150 mg tablet Take 150 mg by mouth daily. Yes Historical Provider   naproxen sodium (ALEVE) 220 mg tablet Take 220 mg by mouth two (2) times daily (with meals). Yes Historical Provider   meloxicam (MOBIC) 15 mg tablet Take 15 mg by mouth as needed for Pain. Yes Historical Provider   metFORMIN (GLUCOPHAGE) 1,000 mg tablet Take 1,000 mg by mouth two (2) times daily (with meals).      Yes Lucho Isabel MD     Current Facility-Administered Medications   Medication Dose Route Frequency    [START ON 6/20/2017] glipiZIDE (GLUCOTROL) tablet 20 mg  20 mg Oral DAILY WITH BREAKFAST    [START ON 6/20/2017] metoprolol succinate (TOPROL-XL) XL tablet 50 mg  50 mg Oral DAILY    famotidine (PEPCID) tablet 40 mg  40 mg Oral QHS    0.9% sodium chloride infusion  75 mL/hr IntraVENous CONTINUOUS    sodium chloride (NS) flush 5-10 mL  5-10 mL IntraVENous Q8H    sodium chloride (NS) flush 5-10 mL  5-10 mL IntraVENous PRN    cyclobenzaprine (FLEXERIL) tablet 10 mg  10 mg Oral TID PRN    diazePAM (VALIUM) tablet 5 mg  5 mg Oral Q6H PRN    acetaminophen (TYLENOL) tablet 650 mg  650 mg Oral Q4H PRN    oxyCODONE-acetaminophen (PERCOCET 10)  mg per tablet 1 Tab  1 Tab Oral Q4H PRN    HYDROmorphone (PF) (DILAUDID) injection 1 mg  1 mg IntraVENous Q3H PRN    ondansetron (ZOFRAN) injection 4 mg  4 mg IntraVENous Q4H PRN    diphenhydrAMINE (BENADRYL) capsule 25 mg  25 mg Oral Q4H PRN    ceFAZolin in 0.9% NS (ANCEF) IVPB soln 2 g  2 g IntraVENous Q8H    docusate sodium (COLACE) capsule 100 mg  100 mg Oral BID    morphine (PF)  mg/30 ml   IntraVENous TITRATE    cloNIDine HCl (CATAPRES) tablet 0.1 mg  0.1 mg Oral Q12H    [START ON 6/20/2017] losartan/hydroCHLOROthiazide (HYZAAR) 100/25 mg   Oral DAILY    hydrALAZINE (APRESOLINE) 20 mg/mL injection 10 mg  10 mg IntraVENous Q6H PRN    enalaprilat (VASOTEC) 1.25 mg in 0.9% sodium chloride 50 mL IVPB  1.25 mg IntraVENous Q6H PRN    prochlorperazine (COMPAZINE) with saline injection 5 mg  5 mg IntraVENous Q4H PRN      REVIEW OF SYSTEMS:    General: negative for fever, chills, sweats, weakness  Eyes: negative for blurred vision, eye pain, loss of vision, diplopia  Ear Nose and Throat: negative for rhinorrhea, pharyngitis, otalgia, tinnitus, speech or swallowing difficulties  Respiratory:  negative for cough, sputum production, SOB, wheezing, WORRELL, pleuritic pain  Cardiology:  negative for chest pain, palpitations, orthopnea, PND, edema, syncope   Gastrointestinal: negative for abdominal pain, N/V, dysphagia, change in bowel habits, bleeding + for nausea/vomiting   Genitourinary: negative for frequency, urgency, dysuria, hematuria, incontinence  Muskuloskeletal : +for arthralgia, myalgia  Hematology: negative for easy bruising, bleeding, lymphadenopathy  Dermatological: negative for rash, ulceration, mole change, new lesion  Endocrine: negative for hot flashes or polydipsia  Neurological: negative for, confusion, focal weakness, paresthesia, memory los; Positive for  headache, dizziness gait disturbance  Psychological: negative for anxiety, depression, agitation    Objective:   VITALS:    Visit Vitals    /90 (BP 1 Location: Left arm, BP Patient Position: At rest)    Pulse 87    Temp 98.5 °F (36.9 °C)    Resp 16    Ht 5' 5\" (1.651 m)    Wt 88.5 kg (195 lb 1.7 oz)    SpO2 95%    BMI 32.47 kg/m2     PHYSICAL EXAM:     GENERAL:    WD x   WN x   Cachectic    Thin    Obese    Disheveled    Ill Appearing Critically    Ill Appearing Chronically    Acute Distress    Other      HEENT:    NC/AT/EOMI x   PERRLA    Conjunctivae Pink    Conjunctivae Pale    Moist Mucosa    Dry Mucosa    Hearing intact to voice    Other + left lateral nystagmus     NECK:    Supple x   Masses    Thyroid Tender    Other                   RESPIRATORY:    CTA bilaterally WITHOUT wheezing/rhonchi/rales or crackles x   Wheezing    Rhonchi    Crackles    Use of accessory muscles    Other      CARDIAC:    regular rate and rhythm No murmurs/rubs/gallops x   Murmur    Rubs    Gallops    Rate Regular/Irregular    Carotid Bruit Left/Right    Lower Extremity Edema    JVP     Other      ABDOMEN:    soft/non distended non tender +bowel sounds no HSM x   Rigid    Tenderness    Hepatomegaly    Splenomegaly    Distended    Normal/Hyper/Hypo Active Bowel Sounds    Other      SKIN / MUSCULOSKELETAL:    Rashes    Ecchymosis    Ulcers    Tight to palpitation    Turgor Good/Poor    Cyanosis/Clubbing    Amputation(s)    Other dsg intact to lumbar with hemovac     NEUROLOGY:    cranial nerves II-XII grossly intact x   Cranial Nerve Deficit    Facial Droop    Slurred Speech    Aphasia    Strength Normal    Weakness    Meningismus/Kernig's Sign/ Brudzinsky    Follows Commands x   Other      PSYCHIATRIC:    AAOx3 in no acute distress x   Insight Poor    Insight Good    Alert and Oriented to Person     Alert and Oriented to Place    Alert and Oriented toTime    Depressed x   Anxious    Agitated    Lethargic    Stuporous    Sedated    Other      ________________________________________________________________________  Care Plan discussed with:    Comments   Patient x    Family      RN x    Care Manager                    Consultant:      ________________________________________________________________________  TOTAL TIME:  30    Comments   >50% of visit spent in counseling and coordination of care       Critical Care Provided     Minutes non procedure based  ________________________________________________________________________  Prudence Fairly, NP      Procedures: see electronic medical records for all procedures/Xrays and details which were not copied into this note but were reviewed prior to creation of Plan.     LAB DATA REVIEWED:    Recent Results (from the past 24 hour(s))   GLUCOSE, POC    Collection Time: 06/19/17 10:13 AM   Result Value Ref Range    Glucose (POC) 166 (H) 65 - 100 mg/dL    Performed by Sheila White    GLUCOSE, POC    Collection Time: 06/19/17  3:41 PM   Result Value Ref Range    Glucose (POC) 166 (H) 65 - 100 mg/dL    Performed by Jarold Marshall    CBC W/O DIFF    Collection Time: 06/19/17  3:56 PM   Result Value Ref Range    WBC 15.2 (H) 3.6 - 11.0 K/uL    RBC 4.29 3.80 - 5.20 M/uL    HGB 13.0 11.5 - 16.0 g/dL    HCT 39.6 35.0 - 47.0 %    MCV 92.3 80.0 - 99.0 FL    MCH 30.3 26.0 - 34.0 PG    MCHC 32.8 30.0 - 36.5 g/dL    RDW 13.8 11.5 - 14.5 %    PLATELET 558 507 - 692 K/uL

## 2017-06-19 NOTE — IP AVS SNAPSHOT
[address] 
[phone] Patient: Floyce Schlatter MRN: [id number] GAR:[id number]/93/[id number] You are allergic to the following No active allergies Recent Documentation Height Weight BMI OB Status Smoking Status 1.651 m 88.5 kg 32.47 kg/m2 Postmenopausal Never Smoker Emergency Contacts Name Discharge Info Relation Home Work Mobile Erwin Kelley CAREGIVER [3] Friend [5] [phone] [facility] [5]   [phone] About your hospitalization You were admitted on:  June 19, 2017 You last received care in the:  [address] [place] [address] You were discharged on:  June 22, 2017 Unit phone number:  [phone] Why you were hospitalized Your primary diagnosis was:  Not on File Your diagnoses also included:  Spinal Instabilities Of Lumbar Region Providers Seen During Your Hospitalizations Provider Role Specialty Primary office phone Neeru Kelly MD Attending Provider Neurosurgery [phone] Your Primary Care Physician (PCP) Primary Care Physician Office Phone Office Fax Adri Robles [phone] Follow-up Information Follow up With Details Comments Contact Info [address] [facility]   [facility] [address] [facility] [address] [phone] Janine Funk MD  after discharge from rehab [address] [place] [address] [address] [place] [address] 
[phone] Neeru Kelly MD Schedule an appointment as soon as possible for a visit in 2 weeks For wound re-check [place] [address] 
[phone] Current Discharge Medication List  
  
START taking these medications Dose & Instructions Dispensing Information Comments Morning Noon Evening Bedtime  
 cyclobenzaprine 10 mg tablet Commonly known as:  FLEXERIL Your last dose was: Your next dose is:    
   
   
 Dose:  10 mg Take 1 Tab by mouth three (3) times daily as needed for Muscle Spasm(s). Quantity:  20 Tab Refills:  0  
     
   
   
   
  
 oxyCODONE-acetaminophen  mg per tablet Commonly known as:  PERCOCET 10 Your last dose was: Your next dose is:    
   
   
 Dose:  1 Tab Take 1 Tab by mouth every four (4) hours as needed. Max Daily Amount: 6 Tabs. Quantity:  32 Tab Refills:  0  
     
   
   
   
  
 polyethylene glycol 17 gram packet Commonly known as:  Guillermo Jackson Your last dose was: Your next dose is:    
   
   
 Dose:  17 g Take 1 Packet by mouth daily. Quantity:  10 Packet Refills:  0  
     
   
   
   
  
 senna-docusate 8.6-50 mg per tablet Commonly known as:  Hill Diehl Your last dose was: Your next dose is:    
   
   
 Dose:  1 Tab Take 1 Tab by mouth daily. Quantity:  10 Tab Refills:  0 CONTINUE these medications which have NOT CHANGED Dose & Instructions Dispensing Information Comments Morning Noon Evening Bedtime  
 cloNIDine HCl 0.1 mg tablet Commonly known as:  CATAPRES Your last dose was: Your next dose is:    
   
   
 Dose:  0.1 mg Take 0.1 mg by mouth two (2) times a day. Refills:  0  
     
   
   
   
  
 glipiZIDE 10 mg tablet Commonly known as:  Fahad Doll Your last dose was: Your next dose is:    
   
   
 Dose:  20 mg Take 20 mg by mouth daily. Refills:  0  
     
   
   
   
  
 losartan-hydroCHLOROthiazide 100-25 mg per tablet Commonly known as:  HYZAAR Your last dose was: Your next dose is:    
   
   
 Dose:  1 Tab Take 1 Tab by mouth daily. Refills:  0  
     
   
   
   
  
 metFORMIN 1,000 mg tablet Commonly known as:  GLUCOPHAGE Your last dose was:     
   
Your next dose is:    
   
   
 Dose:  1000 mg  
 Take 1,000 mg by mouth two (2) times daily (with meals). Refills:  0  
     
   
   
   
  
 metoprolol succinate 50 mg XL tablet Commonly known as:  TOPROL-XL Your last dose was: Your next dose is:    
   
   
 Dose:  50 mg Take 50 mg by mouth daily. Refills:  0  
     
   
   
   
  
 raNITIdine 150 mg tablet Commonly known as:  ZANTAC Your last dose was: Your next dose is:    
   
   
 Dose:  150 mg Take 150 mg by mouth daily. Refills:  0 STOP taking these medications ALEVE 220 mg tablet Generic drug:  naproxen sodium  
   
  
 meloxicam 15 mg tablet Commonly known as:  MOBIC Where to Get Your Medications Information on where to get these meds will be given to you by the nurse or doctor. ! Ask your nurse or doctor about these medications  
  cyclobenzaprine 10 mg tablet  
 oxyCODONE-acetaminophen  mg per tablet  
 polyethylene glycol 17 gram packet  
 senna-docusate 8.6-50 mg per tablet Discharge Instructions Patient meets criteria for BUNDLED PAYMENT  
for Care Improvement Initiative Criteria Contact Information for Orthopedic Nurse Navigator:     
CRISS Wan, RN-BC 
P:[phone] J:[phone] J:[phone] After Hospital Care Plan:  Discharge Instructions Lumbar Fusion Surgery Dr. Kaela Flores Patient Name: Kar White Date of procedure: 6/19/2017  Date of discharge: 6/22/2017 Procedure: Procedure(s): 
L4-5 LAMINECTOMY WITH INSTRUMENTED FUSION, PLIF, ALLOGRAFT, PEDICLE SCREWS  PCP: Bradford Rick MD 
 
Follow up appointments 
-follow up with Dr. Kaela Flores in 2 weeks. Call [phone] to make an appointment as soon as you get home from the hospital. 
 
When to call your Spine Surgeon: 
-Signs of infection-if your incision is red; continues to have drainage; drainage has a foul odor or if you have a persistent fever over 101 degrees for 24 hours -Nausea or vomiting, severe headache 
-Loss of bowel or bladder function, inability to urinate 
-Changes in sensation in your arms or legs (numbness, tingling, loss of color) -Increased weakness-greater than before your surgery 
-Severe pain or pain not relieved by medications 
-Signs of a blood clot in your leg-calf pain, tenderness, redness, swelling of lower leg When to call your Primary Care Physician: 
-Concerns about medical conditions such as diabetes, high blood pressure, asthma, congestive heart failure 
-Call if blood sugars are elevated, persistent headache or dizziness, coughing or congestion, constipation or diarrhea, burning with urination, abnormal heart rate When to call 911 and go to the nearest emergency room: 
-Acute onset of chest pain, shortness of breath, difficulty breathing Activity 
-You are going home a well person, be as active as possible. Your only exercise should be walking. Start with short frequent walks and increase your walking distance each day. 
-Limit the amount of time you sit to 20-30 minute intervals. Sitting for prolonged periods of time will be uncomfortable for you following surgery. 
-Do NOT lift anything over 5 pounds 
-Do NOT do any straining, twisting or bending 
-When you are in bed, you may lay on your back or on either side. Do NOT lie on your stomach Brace 
-If you have a back brace, you should wear your brace at all times when you are out of bed. Do not wear the brace while in bed or showering. 
-Remember to always wear a cotton t-shirt underneath your brace. 
-Do not bend or twist when your brace is off. Diet 
-Resume usual diet; drink plenty of fluids; eat foods high in fiber 
-It is important to have regular bowel movements. Pain medications may cause constipation.   You may want to take a stool softener (such as Senokot-S or Colace) to prevent constipation. 
 -If constipation occurs, take a laxative (such as Dulcolax tablets, Milk of Magnesia, or a suppository). Laxatives should only be used if the above preventable measures have failed and you still have not had a bowel movement after three days Driving 
-You may not drive or return to work until instructed by your physician. However, you may ride in the car for short periods of time. Incision Care 
-You may take brief showers but do not run the water run directly onto the wound. After showering or bathing, remove the wet dressing and gently blot the wound dry with a soft towel. 
-Do not rub or apply any lotions or ointments to your incision site.  
-Do not soak or scrub your wound 
-Keep a dry dressing (guaze and paper tape) on your incision and have it changed daily for 14 days after surgery; more often if your incision is draining. Have your caregiver wash their hands thoroughly before changing your dressing. 
-You will have absorbable sutures and steristrips (white tape) on your incision. Leave the steristrips on until they fall off. Showering 
-You may shower in approximately 2 days after your surgery.   
-Leave the dressing on during your shower. Do NOT allow the water to run directly onto your dressing. Once you get out of the shower, pat the area dry with a towel and put on a dry dressing. 
-Reminder- your brace can be removed while showering. Remember to not bend or twist while your brace is off.   
-Do not take a tub bath. Preventing blood clots 
-You have been given T.E.D. stockings to wear. Continue to wear these for 7 days after your discharge. Put them on in the morning and take them off at night.   
-They are used to increase your circulation and prevent blood clots from forming in your legs 
-T. E.D. stockings can be machine washed, temperature not to exceed 160° F (71°C) and machine dried for 15 to 20 minutes, temperature not to exceed 250° F (121°C).  
 
Pain management 
-Take pain medication as prescribed; decrease the amount you use as your pain lessens 
-DO not wait until you are in extreme pain to take your medication. 
-Avoid alcoholic beverages while taking pain medication Pain Medication Safety DO: 
-Read the Medication Guide  
-Take your medicine exactly as prescribed  
-Store your medicine away from children and in a safe place  
-Call your healthcare provider for medical advice about side effects. 
-Please be aware that many medications contain Tylenol. We do not want you to over medicate so please read the information below as a guide. Do not take more than 4 Grams of Tylenol in a 24 hour period. (There are 1000 milligrams in one Gram) Percocet contains 325 mg of Tylenol per tablet (do not take more than 12 tablets in 24 hours) Lortab contains 500 mg of Tylenol per tablet (do not take more than 8 tablets in 24 hours) Norco contains 325 mg of Tylenol per tablet (do not take more than 12 tablets in 24 hours). DO NOT: 
-Do not give your medicine to others  
-Do not take medicine unless it was prescribed for you  
-Do not stop taking your medicine without talking to your healthcare provider  
-Do not break, chew, crush, dissolve, or inject your medicine. If you cannot swallow your medicine whole, talk to your healthcare provider. 
-Do not drink alcohol while taking this medicine 
-Do not take anti-inflammatory medications or aspirin unless instructed by your   physician. Discharge Orders None [facility] Announcement We are excited to announce that we are making your provider's discharge notes available to you in [employer].   You will see these notes when they are completed and signed by the physician that discharged you from your recent hospital stay. If you have any questions or concerns about any information you see in [employer], please call the Health Information Department where you were seen or reach out to your Primary Care Provider for more information about your plan of care. Introducing [facility]! Shana Kelley introduces [employer] patient portal. Now you can access parts of your medical record, email your doctor's office, and request medication refills online. 1. In your internet browser, go to [email or web address] 2. Click on the First Time User? Click Here link in the Sign In box. You will see the New Member Sign Up page. 3. Enter your [employer] Access Code exactly as it appears below. You will not need to use this code after youve completed the sign-up process. If you do not sign up before the expiration date, you must request a new code. · [employer] Access Code: -[email or web address] Expires: 9/5/2017  9:13 AM 
 
4. Enter the last four digits of your Social Security Number (xxxx) and Date of Birth (mm/dd/yyyy) as indicated and click Submit. You will be taken to the next sign-up page. 5. Create a [employer] ID. This will be your [employer] login ID and cannot be changed, so think of one that is secure and easy to remember. 6. Create a [employer] password. You can change your password at any time. 7. Enter your Password Reset Question and Answer. This can be used at a later time if you forget your password. 8. Enter your e-mail address. You will receive e-mail notification when new information is available in [address] 9. Click Sign Up. You can now view and download portions of your medical record. 10. Click the Download Summary menu link to download a portable copy of your medical information.  
 
If you have questions, please visit the Frequently Asked Questions section of the [employer]. Remember, MyChart is NOT to be used for urgent needs. For medical emergencies, dial 911. Now available from your iPhone and Android! General Information Please provide this summary of care documentation to your next provider. Patient Signature:  ____________________________________________________________ Date:  ____________________________________________________________  
  
[facility] Provider Signature:  ____________________________________________________________ Date:  ____________________________________________________________

## 2017-06-19 NOTE — PROGRESS NOTES
Bedside and Verbal shift change report given to Emery Keys (oncoming nurse) by Xiao Damon (offgoing nurse). Report included the following information SBAR, Kardex and MAR.

## 2017-06-19 NOTE — ANESTHESIA PREPROCEDURE EVALUATION
Anesthetic History   No history of anesthetic complications            Review of Systems / Medical History  Patient summary reviewed, nursing notes reviewed and pertinent labs reviewed    Pulmonary  Within defined limits                 Neuro/Psych   Within defined limits           Cardiovascular  Within defined limits  Hypertension                   GI/Hepatic/Renal  Within defined limits   GERD           Endo/Other  Within defined limits  Diabetes         Other Findings              Physical Exam    Airway  Mallampati: II  TM Distance: > 6 cm  Neck ROM: normal range of motion   Mouth opening: Normal     Cardiovascular  Regular rate and rhythm,  S1 and S2 normal,  no murmur, click, rub, or gallop             Dental  No notable dental hx    Comments: Lower left molar implant   Pulmonary  Breath sounds clear to auscultation               Abdominal  GI exam deferred       Other Findings            Anesthetic Plan    ASA: 3  Anesthesia type: general          Induction: Intravenous  Anesthetic plan and risks discussed with: Patient

## 2017-06-19 NOTE — PERIOP NOTES
TRANSFER - OUT REPORT:    Verbal report given to [facility] RN(name) on Claire Camp  being transferred to 528(unit) for routine progression of care       Report consisted of patients Situation, Background, Assessment and   Recommendations(SBAR). Information from the following report(s) SBAR, OR Summary, Intake/Output and MAR was reviewed with the receiving nurse. Lines:   Peripheral IV 06/19/17 Right Hand (Active)   Site Assessment Clean, dry, & intact 6/19/2017  3:41 PM   Phlebitis Assessment 0 6/19/2017  3:41 PM   Infiltration Assessment 0 6/19/2017  3:41 PM   Dressing Status Clean, dry, & intact 6/19/2017  3:41 PM   Dressing Type Transparent 6/19/2017  9:59 AM   Hub Color/Line Status Infusing 6/19/2017  9:59 AM        Opportunity for questions and clarification was provided.       Patient transported with:   [employer]

## 2017-06-19 NOTE — IP AVS SNAPSHOT
Current Discharge Medication List  
  
START taking these medications Dose & Instructions Dispensing Information Comments Morning Noon Evening Bedtime  
 cyclobenzaprine 10 mg tablet Commonly known as:  FLEXERIL Your last dose was: Your next dose is:    
   
   
 Dose:  10 mg Take 1 Tab by mouth three (3) times daily as needed for Muscle Spasm(s). Quantity:  20 Tab Refills:  0  
     
   
   
   
  
 oxyCODONE-acetaminophen  mg per tablet Commonly known as:  PERCOCET 10 Your last dose was: Your next dose is:    
   
   
 Dose:  1 Tab Take 1 Tab by mouth every four (4) hours as needed. Max Daily Amount: 6 Tabs. Quantity:  32 Tab Refills:  0  
     
   
   
   
  
 polyethylene glycol 17 gram packet Commonly known as:  [place] Cost Your last dose was: Your next dose is:    
   
   
 Dose:  17 g Take 1 Packet by mouth daily. Quantity:  10 Packet Refills:  0  
     
   
   
   
  
 senna-docusate 8.6-50 mg per tablet Commonly known as:  Dontrell Mole Your last dose was: Your next dose is:    
   
   
 Dose:  1 Tab Take 1 Tab by mouth daily. Quantity:  10 Tab Refills:  0 CONTINUE these medications which have NOT CHANGED Dose & Instructions Dispensing Information Comments Morning Noon Evening Bedtime  
 cloNIDine HCl 0.1 mg tablet Commonly known as:  CATAPRES Your last dose was: Your next dose is:    
   
   
 Dose:  0.1 mg Take 0.1 mg by mouth two (2) times a day. Refills:  0  
     
   
   
   
  
 glipiZIDE 10 mg tablet Commonly known as:  Zaynab He Your last dose was: Your next dose is:    
   
   
 Dose:  20 mg Take 20 mg by mouth daily. Refills:  0  
     
   
   
   
  
 losartan-hydroCHLOROthiazide 100-25 mg per tablet Commonly known as:  HYZAAR Your last dose was: Your next dose is:    
   
   
 Dose:  1 Tab Take 1 Tab by mouth daily. Refills:  0  
     
   
   
   
  
 metFORMIN 1,000 mg tablet Commonly known as:  GLUCOPHAGE Your last dose was: Your next dose is:    
   
   
 Dose:  1000 mg Take 1,000 mg by mouth two (2) times daily (with meals). Refills:  0  
     
   
   
   
  
 metoprolol succinate 50 mg XL tablet Commonly known as:  TOPROL-XL Your last dose was: Your next dose is:    
   
   
 Dose:  50 mg Take 50 mg by mouth daily. Refills:  0  
     
   
   
   
  
 raNITIdine 150 mg tablet Commonly known as:  ZANTAC Your last dose was: Your next dose is:    
   
   
 Dose:  150 mg Take 150 mg by mouth daily. Refills:  0 STOP taking these medications ALEVE 220 mg tablet Generic drug:  naproxen sodium  
   
  
 meloxicam 15 mg tablet Commonly known as:  MOBIC Where to Get Your Medications Information on where to get these meds will be given to you by the nurse or doctor. ! Ask your nurse or doctor about these medications  
  cyclobenzaprine 10 mg tablet  
 oxyCODONE-acetaminophen  mg per tablet  
 polyethylene glycol 17 gram packet  
 senna-docusate 8.6-50 mg per tablet

## 2017-06-19 NOTE — PROGRESS NOTES
Primary Nurse Vanessa Rose RN and Caesar Wan RN performed a dual skin assessment on this patient No impairment noted  Ben score is 21

## 2017-06-19 NOTE — PERIOP NOTES
Patient interviewed in holding area, identity and procedure verified. SCDs applied prior to anesthesia induction. Pressure preset. Patient was intubated in the supine position on stretcher, then transferred to OR table with assistance and place in the prone position. Final position approved by Dr. Darryl Amor. All lines are padded and secured. May hugger cover applied by anesthesia provider. Temperature monitored by anesthesia provider. Family contacted at beginning of procedure. 10 ml Floseal to surgical field to be used topically PRN.

## 2017-06-19 NOTE — PROGRESS NOTES
Pt blood pressure was elevated. Dr. Luna Benavides and Denae Vicente were contacted. Orders were given.

## 2017-06-19 NOTE — ANESTHESIA POSTPROCEDURE EVALUATION
Post-Anesthesia Evaluation and Assessment    Patient: Zari Serrano MRN: [id number]  SSN: [id number]    YOB: 1950  Age: 78 y.o. Sex: female       Cardiovascular Function/Vital Signs  Visit Vitals    /84    Pulse 62    Temp 36.6 °C (97.8 °F)    Resp 13    Ht 5' 5\" (1.651 m)    Wt 88.5 kg (195 lb 1.7 oz)    SpO2 100%    BMI 32.47 kg/m2       Patient is status post general anesthesia for Procedure(s):  L4-5 LAMINECTOMY WITH INSTRUMENTED FUSION, PLIF, ALLOGRAFT, PEDICLE SCREWS. Nausea/Vomiting: None    Postoperative hydration reviewed and adequate. Pain:  Pain Scale 1: Numeric (0 - 10) (06/19/17 1541)  Pain Intensity 1: 0 (06/19/17 1541)   Managed    Neurological Status:   Neuro (WDL): Within Defined Limits (06/19/17 1541)  Neuro  LLE Motor Response: Purposeful (06/19/17 1541)  RLE Motor Response: Purposeful (06/19/17 1541)   At baseline    Mental Status and Level of Consciousness: Arousable    Pulmonary Status:   O2 Device: Nasal cannula (06/19/17 1541)   Adequate oxygenation and airway patent    Complications related to anesthesia: None    Post-anesthesia assessment completed.  No concerns    Signed By: Dollene Dancer, MD     June 19, 2017

## 2017-06-19 NOTE — BRIEF OP NOTE
BRIEF OPERATIVE NOTE    Date of Procedure: 6/19/2017   Preoperative Diagnosis: L4-5 STENOSIS, INSTABILITY  Postoperative Diagnosis: L4-5 STENOSIS, INSTABILITY    Procedure(s):  L4-5 LAMINECTOMY WITH INSTRUMENTED FUSION, PLIF, ALLOGRAFT, PEDICLE SCREWS  Surgeon(s) and Role:     * Danay Christine MD - Primary         Assistant Staff:       Surgical Staff:  Circ-1: Windy Gonzales RN  Circ-Relief: Quinten Diaz RN; Emiliano Weeks RN  Radiology Technician: Sandeep Munoz, RT, R  Scrub RN-1: Saud Daniels RN  Scrub RN-Relief: Quinten Diaz RN  Surg Asst-1: Nilam Bhakta  Surg Asst-Relief: Amanda Brown  Float Staff: Quinten Diaz RN  Event Time In   Incision Start 1146   Incision Close      Anesthesia: General   Estimated Blood Loss: 400  Specimens: * No specimens in log *   Findings: severe stenosis   Complications: durotomy  Implants:   Implant Name Type Inv.  Item Serial No.  Lot No. LRB No. Used Action   GRAFT BNE RECOMB HUM INFUS SM --  - SN/A  GRAFT BNE RECOMB HUM INFUS SM --  N/A [employer] [id number] N/A 1 Implanted   Expandable Interbody Fusion Device, Standard   N/A [employer] [id number] N/A 1 Implanted   SCR SPNE MAS 6.5X50 CC -- CD HORIZON SOLERA - SN/A  SCR SPNE MAS 6.5X50 CC -- CD HORIZON SOLERA N/A [employer] N/A N/A 4 Implanted   SCR SET SPNE BRK-OFF 5.5MM TI --  - SN/A  SCR SET SPNE BRK-OFF 5.5MM TI --  N/A [employer] N/A N/A 4 Implanted   MARILYN SPNE CP4 NS CRV 5.5X35MM -- CD HORIZON SOLERA - SN/A  MARILYN SPNE CP4 NS CRV 5.5X35MM -- CD HORIZON SOLERA N/A [employer] N/A N/A 1 Implanted   MARILYN SPNE CP4 NS CRV 5.5X40MM -- CD HORIZON SOLERA - SN/A   MARILYN SPNE CP4 NS CRV 5.5X40MM -- CD HORIZON SOLERA N/A [employer] N/A N/A 1 Implanted

## 2017-06-20 ENCOUNTER — APPOINTMENT (OUTPATIENT)
Dept: GENERAL RADIOLOGY | Age: 67
DRG: 460 | End: 2017-06-20
Attending: NEUROLOGICAL SURGERY
Payer: MEDICARE

## 2017-06-20 LAB
BASOPHILS # BLD AUTO: 0 K/UL (ref 0–0.1)
BASOPHILS # BLD: 0 % (ref 0–1)
EOSINOPHIL # BLD: 0 K/UL (ref 0–0.4)
EOSINOPHIL NFR BLD: 0 % (ref 0–7)
ERYTHROCYTE [DISTWIDTH] IN BLOOD BY AUTOMATED COUNT: 13.5 % (ref 11.5–14.5)
EST. AVERAGE GLUCOSE BLD GHB EST-MCNC: 197 MG/DL
GLUCOSE BLD STRIP.AUTO-MCNC: 128 MG/DL (ref 65–100)
GLUCOSE BLD STRIP.AUTO-MCNC: 133 MG/DL (ref 65–100)
GLUCOSE BLD STRIP.AUTO-MCNC: 174 MG/DL (ref 65–100)
GLUCOSE BLD STRIP.AUTO-MCNC: 206 MG/DL (ref 65–100)
HBA1C MFR BLD: 8.5 % (ref 4.2–6.3)
HCT VFR BLD AUTO: 38.6 % (ref 35–47)
HGB BLD-MCNC: 12.7 G/DL (ref 11.5–16)
LYMPHOCYTES # BLD AUTO: 16 % (ref 12–49)
LYMPHOCYTES # BLD: 1.9 K/UL (ref 0.8–3.5)
MCH RBC QN AUTO: 30.3 PG (ref 26–34)
MCHC RBC AUTO-ENTMCNC: 32.9 G/DL (ref 30–36.5)
MCV RBC AUTO: 92.1 FL (ref 80–99)
MONOCYTES # BLD: 0.9 K/UL (ref 0–1)
MONOCYTES NFR BLD AUTO: 8 % (ref 5–13)
NEUTS SEG # BLD: 9.4 K/UL (ref 1.8–8)
NEUTS SEG NFR BLD AUTO: 76 % (ref 32–75)
PLATELET # BLD AUTO: 341 K/UL (ref 150–400)
RBC # BLD AUTO: 4.19 M/UL (ref 3.8–5.2)
SERVICE CMNT-IMP: ABNORMAL
WBC # BLD AUTO: 12.2 K/UL (ref 3.6–11)

## 2017-06-20 PROCEDURE — 85025 COMPLETE CBC W/AUTO DIFF WBC: CPT | Performed by: NEUROLOGICAL SURGERY

## 2017-06-20 PROCEDURE — 65270000029 HC RM PRIVATE

## 2017-06-20 PROCEDURE — 77010033678 HC OXYGEN DAILY

## 2017-06-20 PROCEDURE — 74011000250 HC RX REV CODE- 250: Performed by: NURSE PRACTITIONER

## 2017-06-20 PROCEDURE — 36415 COLL VENOUS BLD VENIPUNCTURE: CPT | Performed by: NEUROLOGICAL SURGERY

## 2017-06-20 PROCEDURE — 74011250637 HC RX REV CODE- 250/637: Performed by: NEUROLOGICAL SURGERY

## 2017-06-20 PROCEDURE — 83036 HEMOGLOBIN GLYCOSYLATED A1C: CPT | Performed by: NURSE PRACTITIONER

## 2017-06-20 PROCEDURE — 74011250636 HC RX REV CODE- 250/636: Performed by: NURSE PRACTITIONER

## 2017-06-20 PROCEDURE — 74011636637 HC RX REV CODE- 636/637: Performed by: NURSE PRACTITIONER

## 2017-06-20 PROCEDURE — 82962 GLUCOSE BLOOD TEST: CPT

## 2017-06-20 PROCEDURE — 76001 XR FLUOROSCOPY OVER 60 MINUTES: CPT

## 2017-06-20 PROCEDURE — 74011250636 HC RX REV CODE- 250/636: Performed by: NEUROLOGICAL SURGERY

## 2017-06-20 RX ADMIN — LOSARTAN POTASSIUM: 50 TABLET, FILM COATED ORAL at 09:12

## 2017-06-20 RX ADMIN — PROCHLORPERAZINE EDISYLATE 5 MG: 5 INJECTION INTRAMUSCULAR; INTRAVENOUS at 06:46

## 2017-06-20 RX ADMIN — DIAZEPAM 5 MG: 5 TABLET ORAL at 20:45

## 2017-06-20 RX ADMIN — INSULIN LISPRO 3 UNITS: 100 INJECTION, SOLUTION INTRAVENOUS; SUBCUTANEOUS at 11:53

## 2017-06-20 RX ADMIN — OXYCODONE HYDROCHLORIDE AND ACETAMINOPHEN 1 TABLET: 10; 325 TABLET ORAL at 13:17

## 2017-06-20 RX ADMIN — CEFAZOLIN 2 G: 1 INJECTION, POWDER, FOR SOLUTION INTRAMUSCULAR; INTRAVENOUS; PARENTERAL at 06:49

## 2017-06-20 RX ADMIN — CLONIDINE HYDROCHLORIDE 0.1 MG: 0.1 TABLET ORAL at 09:12

## 2017-06-20 RX ADMIN — DOCUSATE SODIUM 100 MG: 100 CAPSULE, LIQUID FILLED ORAL at 17:16

## 2017-06-20 RX ADMIN — CLONIDINE HYDROCHLORIDE 0.1 MG: 0.1 TABLET ORAL at 20:33

## 2017-06-20 RX ADMIN — SODIUM CHLORIDE 100 ML/HR: 900 INJECTION, SOLUTION INTRAVENOUS at 15:51

## 2017-06-20 RX ADMIN — DOCUSATE SODIUM 100 MG: 100 CAPSULE, LIQUID FILLED ORAL at 09:13

## 2017-06-20 RX ADMIN — INSULIN LISPRO 2 UNITS: 100 INJECTION, SOLUTION INTRAVENOUS; SUBCUTANEOUS at 06:48

## 2017-06-20 RX ADMIN — METOPROLOL SUCCINATE 50 MG: 50 TABLET, EXTENDED RELEASE ORAL at 09:13

## 2017-06-20 RX ADMIN — OXYCODONE HYDROCHLORIDE AND ACETAMINOPHEN 1 TABLET: 10; 325 TABLET ORAL at 09:12

## 2017-06-20 RX ADMIN — Medication 10 ML: at 06:49

## 2017-06-20 RX ADMIN — OXYCODONE HYDROCHLORIDE AND ACETAMINOPHEN 1 TABLET: 10; 325 TABLET ORAL at 17:16

## 2017-06-20 RX ADMIN — Medication 10 ML: at 22:36

## 2017-06-20 RX ADMIN — SODIUM CHLORIDE 75 ML/HR: 900 INJECTION, SOLUTION INTRAVENOUS at 06:31

## 2017-06-20 RX ADMIN — GLIPIZIDE 20 MG: 5 TABLET ORAL at 09:11

## 2017-06-20 RX ADMIN — FAMOTIDINE 40 MG: 20 TABLET ORAL at 20:33

## 2017-06-20 NOTE — DIABETES MGMT
DTC Progress Note    Recommendations/ Comments: Chart reviewed secondary to hyperglycemia. Noted Dr. Stanton Conception discussion with patient concerning need for insulin at discharge. Per MD she refuses insulin outpatient. Currently has required 5 units of correction insulin. Restarted her glipizide 20 mg daily. Please consider referral to DTC for outpatient diabetes education. Chart reviewed on [facility]. Patient is a 78 y.o. female with  Type 2 Diabetes on oral agent (monotherapy): glipizide (generic) at home. A1c:   Lab Results   Component Value Date/Time    Hemoglobin A1c 8.5 06/20/2017 04:02 AM    Hemoglobin A1c 8.2 06/07/2017 10:10 AM       Recent Glucose Results:   Lab Results   Component Value Date/Time    GLUCPOC 206 (H) 06/20/2017 11:40 AM    GLUCPOC 174 (H) 06/20/2017 06:38 AM    GLUCPOC 270 (H) 06/19/2017 09:59 PM        Lab Results   Component Value Date/Time    Creatinine 1.01 06/07/2017 10:10 AM     Estimated Creatinine Clearance: 60.2 mL/min (based on Cr of 1.01). Active Orders   Diet    DIET GI LITE (POST SURGICAL) No Conc. Sweets        PO intake: No data found. Current hospital DM medication: Lispro Correctional insulin with normal sensitivity, glipizide 20 mg q day    Will continue to follow as needed.     Thank you  Braxton Pedroza RD,CDE   [address]

## 2017-06-20 NOTE — PROGRESS NOTES
Occupational Therapy 1100    Noted plan for flat in bed today. Will follow up with OT evaluation tomorrow as appropriate.       Daiana Luan OTR/L

## 2017-06-20 NOTE — PROGRESS NOTES
Physical Therapy  Received consult, spoke with nurse who reports pt is to remain flat in bed today due to durotomy, will follow up for evaluation when pt is cleared for OOB activity  Mulu Pimentel PT

## 2017-06-20 NOTE — PROGRESS NOTES
Neurosurgery Progress Note    Elmira Vazquez ACNP-BC  [phone]    Admit Date: 2017   LOS: 1 day        Daily Progress Note: 2017    POD:1 Day Post-Op    S/P: Procedure(s):  L4-5 LAMINECTOMY WITH INSTRUMENTED FUSION, PLIF, ALLOGRAFT, PEDICLE SCREWS    Subjective: The patient has been having back problems for a long time. She also has had orthopedic problems with her right foot, including a bone graft which did not heal properly. The pain in her back is exacerbate by standing or sitting for long periods of time. She is unable to walk long distances and cannot go shopping due to pain. She had a mobile grade 1 spondylolisthesis at L4-5, which does increase with flexion. She also had collapse of the disks at L5-S1 and L3-4 with slight retrolisthesis but no instability. The patient has mechanical back pain with some intermittent neurogenic components. She has had extensive conservative treatment with physical therapy, acupuncture, epidural steroid injections, medication without relief. She underwent an L4-5 laminectomy with instrumented fusion yesterday with Dr. Alma Juarez. Denies  chest pain,nausea, vomiting, difficulty swallowing, headache, and dyspnea.        Objective:     Vital signs  Temp (24hrs), Av.3 °F (36.8 °C), Min:97.6 °F (36.4 °C), Max:98.7 °F (37.1 °C)       1901 -  0700  In: 2750 [I.V.:2750]  Out: 1862 [Urine:1975; Drains:510]    Visit Vitals    /84 (BP 1 Location: Left arm, BP Patient Position: At rest)    Pulse 89    Temp 98.7 °F (37.1 °C)    Resp 16    Ht 5' 5\" (1.651 m)    Wt 88.5 kg (195 lb 1.7 oz)    SpO2 93%    BMI 32.47 kg/m2    O2 Flow Rate (L/min): 2 l/min O2 Device: Nasal cannula     Pain control  Pain Assessment  Pain Scale 1: Numeric (0 - 10)  Pain Intensity 1: 5  Pain Onset 1: post op  Pain Location 1: Back  Pain Orientation 1: Posterior  Pain Description 1: Aching  Pain Intervention(s) 1: Medication (see MAR)    PT/OT  Gait Physical Exam:  Gen:NAD. Neuro: A&Ox3. Follows commands. Speech clear. Affect normal.  BARRAZA spontaneously. Sensation intact  Gait deferred. Skin: Dressing C/D/I with minimal drainage on it  Heart RRR  Lungs CTA BL  Abd soft, NT  Ext no edema    24 hour results:    Recent Results (from the past 24 hour(s))   GLUCOSE, POC    Collection Time: 06/19/17  3:41 PM   Result Value Ref Range    Glucose (POC) 166 (H) 65 - 100 mg/dL    Performed by [facility]    CBC W/O DIFF    Collection Time: 06/19/17  3:56 PM   Result Value Ref Range    WBC 15.2 (H) 3.6 - 11.0 K/uL    RBC 4.29 3.80 - 5.20 M/uL    HGB 13.0 11.5 - 16.0 g/dL    HCT 39.6 35.0 - 47.0 %    MCV 92.3 80.0 - 99.0 FL    MCH 30.3 26.0 - 34.0 PG    MCHC 32.8 30.0 - 36.5 g/dL    RDW 13.8 11.5 - 14.5 %    PLATELET 292 549 - 252 K/uL   GLUCOSE, POC    Collection Time: 06/19/17  9:59 PM   Result Value Ref Range    Glucose (POC) 270 (H) 65 - 100 mg/dL    Performed by Tavares Danielle    CBC WITH AUTOMATED DIFF    Collection Time: 06/20/17  4:02 AM   Result Value Ref Range    WBC 12.2 (H) 3.6 - 11.0 K/uL    RBC 4.19 3.80 - 5.20 M/uL    HGB 12.7 11.5 - 16.0 g/dL    HCT 38.6 35.0 - 47.0 %    MCV 92.1 80.0 - 99.0 FL    MCH 30.3 26.0 - 34.0 PG    MCHC 32.9 30.0 - 36.5 g/dL    RDW 13.5 11.5 - 14.5 %    PLATELET 322 665 - 945 K/uL    NEUTROPHILS 76 (H) 32 - 75 %    LYMPHOCYTES 16 12 - 49 %    MONOCYTES 8 5 - 13 %    EOSINOPHILS 0 0 - 7 %    BASOPHILS 0 0 - 1 %    ABS. NEUTROPHILS 9.4 (H) 1.8 - 8.0 K/UL    ABS. LYMPHOCYTES 1.9 0.8 - 3.5 K/UL    ABS. MONOCYTES 0.9 0.0 - 1.0 K/UL    ABS. EOSINOPHILS 0.0 0.0 - 0.4 K/UL    ABS.  BASOPHILS 0.0 0.0 - 0.1 K/UL   HEMOGLOBIN A1C WITH EAG    Collection Time: 06/20/17  4:02 AM   Result Value Ref Range    Hemoglobin A1c 8.5 (H) 4.2 - 6.3 %    Est. average glucose 197 mg/dL   GLUCOSE, POC    Collection Time: 06/20/17  6:38 AM   Result Value Ref Range    Glucose (POC) 174 (H) 65 - 100 mg/dL    Performed by Hyacinth Delcid GLUCOSE, POC    Collection Time: 06/20/17 11:40 AM   Result Value Ref Range    Glucose (POC) 206 (H) 65 - 100 mg/dL    Performed by Alejandra Dash           Assessment:     Active Problems:    Spinal instabilities of lumbar region (6/19/2017)        Plan:   1. Lumbar spinal instability   - s/p fusion L4-5 06/19   - flat in bed today due to durotomy   - cont HVAC and hernandez   - ADAT   - Pain control  2. HTN   - Pt states she has white coat syndrome   - Restart metoprolol, clonidine, losartan, HCTZ from home   - Hospitalist following  3. Diabetes mellitus, type 2   - Hgb A1C 8.5   - SSI and Accu-checks   - cont glipizide from home   - she is on metformin at home. . Can she be restarted on this when taking PO well?   - diabetes treatment center consult   - hospitalist following    Activity: bedrest today  DVT ppx: SCDs  Dispo: SNF    Plan d/w Dr. Catherine Orozco, case management, nurse      Arlene Robbins NP

## 2017-06-20 NOTE — PROGRESS NOTES
Assessemnt/Plan:   Daily Progress Note: 2017    Admit date: 2017  8:49 AM    Hospitalist Progress Note   Maria Esther Hou [phone]; Call physician on-call through the  7pm-7am          PCP: Eddie Lei MD   In Hospital Procedure:   Procedure(s):  L4-5 LAMINECTOMY WITH INSTRUMENTED FUSION, PLIF, ALLOGRAFT, PEDICLE SCREWS  Consultants this Hospitalization:   IP CONSULT TO HOSPITALIST    In Hospital Procedure:   Procedure(s):  L4-5 LAMINECTOMY WITH INSTRUMENTED FUSION, PLIF, ALLOGRAFT, PEDICLE SCREWS           NAME:  Domenica Chow      :  1961  MRN:  [id number]      Admission Summary:     Elevated bp's prompted medical consult following laminectomy. Chief Complaint for 2017 ; and pertinent  Interval history / Subjective:    No cp, no sob, gets dizzy easily pTA. ; BP now stable, discussed DM issues w pt ; seems she has been advised to go on insulin in past, ; refuses, pt is now advised to reconsider and at least discuss with pcp up dosing oral agents and tp is advised to work on her diet. Assessment & Plan:   Addressed by this writer at this time:  Post op day + 1. Seems pt's medical's are optimal at this time,  Wbc slightly up at 12, pt finished  routine post op abx -  .        DM. 2 poorly controled, see discussions above, pt on orals rx PTA and refuses insulins on an out pt bases. Cont on SSI and her oral agents now. a1c 8/5. HTN controled on current home regimen. No adjustments.      H/O UC no n/v.abd pain 2017    Code status: full   DVT prophylaxis: SCD's     Care Plan discussed with: Patient/Family and Nurse  Disposition: Home w/Family and TBD     Subjective:     ROS      No cp, no sob, no n/v/fever,         Could NOT obtain due to:      Objective:       PHYSICAL EXAM:  Vital signs below          Constitutional:    No acute distress  ,     Alert;           Conversant;      Eyes: anicteric sclerae, moist conjunctivae;    no  lid-lag; + PERRLA;    Ears, nose,mouth,and throat:   Normal external ears ; oropharynx clear with  moist  mucous membranes; no posterior oral lesions; and  trach is  mid line; Respiratory:   Trachea midline;  CTA   with normal  respiratory effort; and no   intercostal retractions;    Cardiovascular: RRR, no MRGs;    Gastrointestinal: Soft, non-tender ;     guarding; no   rebound;         Musculoskelatal:  extreme ties are supple;  no   peripheral edema;  neck with   FROM; atraumatic    Skin:  Temperature nl  ;  turgor is nl   ; the texture nl   ;  no   rash;     Neurologic: CN 2- 12   intact; Alert oriented, carmel upper ext on comand. ;      Psychiatric:  Appropriate   affect; alert  ; and  oriented to person, place , and time;       VITALS:   Last 24hrs VS reviewed since prior progress note.  Most recent are:  Patient Vitals for the past 24 hrs:   Temp Pulse Resp BP SpO2   06/20/17 0908 98.7 °F (37.1 °C) 89 16 155/84 93 %   06/20/17 0351 98.6 °F (37 °C) 77 16 129/76 94 %   06/19/17 2350 98.5 °F (36.9 °C) 83 16 130/79 94 %   06/19/17 2056 98.1 °F (36.7 °C) 79 16 (!) 166/94 95 %   06/19/17 1944 98.5 °F (36.9 °C) 87 16 156/90 95 %   06/19/17 1924 - 82 - (!) 168/91 -   06/19/17 1858 98.3 °F (36.8 °C) 79 16 (!) 201/109 95 %   06/19/17 1758 97.6 °F (36.4 °C) 73 14 (!) 182/104 95 %   06/19/17 1730 - 64 11 160/85 99 %   06/19/17 1715 - 67 10 173/82 96 %   06/19/17 1700 - 68 13 173/86 99 %   06/19/17 1645 - 66 12 162/86 99 %   06/19/17 1630 - 63 11 159/80 98 %   06/19/17 1615 - 64 14 152/83 100 %   06/19/17 1600 - 62 13 155/84 100 %   06/19/17 1550 - 64 13 (!) 176/96 100 %   06/19/17 1545 - 71 14 (!) 157/96 97 %   06/19/17 1541 97.8 °F (36.6 °C) 78 12 (!) 172/96 98 %   06/19/17 1540 - 64 12 (!) 162/91 92 %   06/19/17 0947 98.4 °F (36.9 °C) 86 12 (!) 182/117 97 %       Intake/Output Summary (Last 24 hours) at 06/20/17 0931  Last data filed at 06/20/17 0654   Gross per 24 hour   Intake             2750 ml   Output 2785 ml   Net              -35 ml               Reviewed most current radiology test results   KUB nl whitley pattern.    Review and summation of old records today    Reviewed patient's current orders and MAR   y  PMH/ reviewed - no change compared to H&P  ______________________________________________________________________  Medicines:    Current Facility-Administered Medications:     prochlorperazine (COMPAZINE) with saline injection 10 mg, 10 mg, IntraVENous, Q6H PRN, Shyanne Betancourt MD    glipiZIDE (GLUCOTROL) tablet 20 mg, 20 mg, Oral, DAILY WITH BREAKFAST, Shyanne Betancourt MD, 20 mg at 06/20/17 0911    metoprolol succinate (TOPROL-XL) XL tablet 50 mg, 50 mg, Oral, DAILY, Shyanne Betancourt MD, 50 mg at 06/20/17 0913    famotidine (PEPCID) tablet 40 mg, 40 mg, Oral, QHS, Shyanne Betancourt MD, 40 mg at 06/19/17 2022    0.9% sodium chloride infusion, 100 mL/hr, IntraVENous, CONTINUOUS, Shyanne Betancourt MD, Last Rate: 100 mL/hr at 06/20/17 0907, 100 mL/hr at 06/20/17 0907    sodium chloride (NS) flush 5-10 mL, 5-10 mL, IntraVENous, Q8H, Shyanne Betancourt MD, 10 mL at 06/20/17 9978    sodium chloride (NS) flush 5-10 mL, 5-10 mL, IntraVENous, PRN, Shyanne Betancourt MD    cyclobenzaprine (FLEXERIL) tablet 10 mg, 10 mg, Oral, TID PRN, Shyanne Betancourt MD    diazePAM (VALIUM) tablet 5 mg, 5 mg, Oral, Q6H PRN, Shyanne Betancourt MD    acetaminophen (TYLENOL) tablet 650 mg, 650 mg, Oral, Q4H PRN, Shyanne Betancourt MD    oxyCODONE-acetaminophen (PERCOCET 10)  mg per tablet 1 Tab, 1 Tab, Oral, Q4H PRN, Shyanne Betancourt MD, 1 Tab at 06/20/17 0912    HYDROmorphone (PF) (DILAUDID) injection 1 mg, 1 mg, IntraVENous, Q3H PRN, Shyanne Betancourt MD    ondansetron [facility]) injection 4 mg, 4 mg, IntraVENous, Q4H PRN, Shyanne Betancourt MD, 4 mg at 06/19/17 1903    diphenhydrAMINE (BENADRYL) capsule 25 mg, 25 mg, Oral, Q4H PRN, Shyanne Betancourt MD    docusate sodium (COLACE) capsule 100 mg, 100 mg, Oral, BID, Donovan Brito MD, 100 mg at 06/20/17 6205    cloNIDine HCl (CATAPRES) tablet 0.1 mg, 0.1 mg, Oral, Q12H, Donovan Brito MD, 0.1 mg at 06/20/17 0912    losartan/hydroCHLOROthiazide (HYZAAR) 100/25 mg, , Oral, DAILY, Donovan Brito MD    hydrALAZINE (APRESOLINE) 20 mg/mL injection 10 mg, 10 mg, IntraVENous, Q6H PRN, Donovan Brito MD, 10 mg at 06/19/17 1902    enalaprilat (VASOTEC) 1.25 mg in 0.9% sodium chloride 50 mL IVPB, 1.25 mg, IntraVENous, Q6H PRN, Donovan Brito MD    glucose chewable tablet 16 g, 4 Tab, Oral, PRN, Becky Moritz, NP    glucagon (GLUCAGEN) injection 1 mg, 1 mg, IntraMUSCular, PRN, Becky Moritz, NP    insulin lispro (HUMALOG) injection, , SubCUTAneous, AC&HS, Becky Moritz, NP, 2 Units at 06/20/17 0933    dextrose 10 % infusion 125-250 mL, 125-250 mL, IntraVENous, PRN, Becky Moritz, NP  Procedures: see electronic medical records for all procedures/Xrays and details which were not copied into this note but were reviewed prior to creation of Plan. LABS:  Recent Labs      06/20/17   0402  06/19/17   1556   WBC  12.2*  15.2*   HGB  12.7  13.0   HCT  38.6  39.6   PLT  341  342     No results for input(s): NA, K, CL, CO2, BUN, CREA, GLU, CA, MG, PHOS, URICA in the last 72 hours. No results for input(s): SGOT, GPT, ALT, AP, TBIL, TBILI, TP, ALB, GLOB, GGT, AML, LPSE in the last 72 hours. No lab exists for component: AMYP, HLPSE  No results for input(s): INR, PTP, APTT in the last 72 hours. No lab exists for component: INREXT   No results for input(s): FE, TIBC, PSAT, FERR in the last 72 hours. No results found for: FOL, RBCF   No results for input(s): PH, PCO2, PO2 in the last 72 hours. No results for input(s): PHI, PO2I, PCO2I in the last 72 hours. No results for input(s): CPK, CKNDX, TROIQ in the last 72 hours.     No lab exists for component: CPKMB  No results found for: CHOL, CHOLX, CHLST, CHOLV, HDL, LDL, LDLC, NORMLP, Danny Wilkerson, [facility], [facility]  Lab Results   Component Value Date/Time    Glucose (POC) 174 06/20/2017 06:38 AM    Glucose (POC) 270 06/19/2017 09:59 PM    Glucose (POC) 166 06/19/2017 03:41 PM    Glucose (POC) 166 06/19/2017 10:13 AM     No results found for: COLOR, APPRN, SPGRU, REFSG, BEATA, PROTU, GLUCU, KETU, BILU, UROU, NSEHAL, LEUKU, GLUKE, EPSU, BACTU, WBCU, RBCU, CASTS, UCRY        CULTURES:    No results found for: SDES Lab Results   Component Value Date/Time    Culture result:  06/07/2017 11:25 AM     MRSA NOT PRESENT. Apparent Staphylococus aureus (not MRSA noted). Culture result:  06/07/2017 11:25 AM         Screening of patient nares for MRSA is for surveillance purposes and, if positive, to facilitate isolation considerations in high risk settings. It is not intended for automatic decolonization interventions per se as regimens are not sufficiently effective to warrant routine use. CT Results (most recent):    Results from Hospital Encounter encounter on 01/27/15   CT HEAD WO CONT   Narrative **Final Report**      ICD Codes / Adm. Diagnosis: 435.9   / Unspecified transient cerebral    Examination:  CT HEAD WO CON  - [id number] - Jan 27 2015  4:28PM  Accession No:  [id number]  Reason:  TIA (transient ischemic attack)      REPORT:  EXAM:  CT HEAD WO CON    INDICATION:   TIA (transient ischemic attack)    COMPARISON: None. TECHNIQUE: Unenhanced CT of the head was performed using 5 mm images. Brain   and bone windows were generated. Sagittal and coronal reformatted images   were also generated. FINDINGS:  The ventricles and sulci are normal in size, shape and configuration and   midline. There is a small wedge-shaped low-attenuation area in the left   inferior occipital lobe, likely representing a small chronic infarct. . There   is no intracranial hemorrhage, extra-axial collection, mass, mass effect or   midline shift. The basilar cisterns are open.  Areas of hypodensity in the   cerebral white matter are likely due to intracranial small vessel disease. .   The bone windows demonstrate no abnormalities. The visualized paranasal   sinuses and mastoid air cells are clear. There is vertebral basilar   dolichoectasia. Mild calcification of the cavernous carotid arteries is   present. .       IMPRESSION:   1. No acute findings. 2. 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 Areas of white matter   hypodensity likely due to small vessel disease. 3. Vertebral basilar dolichoectasia. Mild vascular calcification.            Signing/Reading Doctor: Laura Franco ([id number])    Approved: Laura Franco ([id number])  Jan 27 2015  4:58PM                                     (Use of word \"will\" = I will, or I did, or done by me, or to be done by me)  Zuliema Payne MD

## 2017-06-20 NOTE — PROGRESS NOTES
Pt sitting up for breakfast. Some nausea and h/a. Neuro appears intact. Anxiety. Keep bed rest and hernandez today. hvac looks mostly bloody. Will uncharge.

## 2017-06-20 NOTE — PROGRESS NOTES
Care Management Interventions  PCP Verified by CM: Yes  Mode of Transport at Discharge: BLS  Physical Therapy Consult: Yes  Occupational Therapy Consult: Yes  Speech Therapy Consult: No  Current Support Network: Own Home  Confirm Follow Up Transport: Family  Plan discussed with Pt/Family/Caregiver: Yes  Freedom of Choice Offered: Yes  Discharge Location  Discharge Placement: Skilled nursing facility    Chart reviewed for transitions of care, discussed patient during rounds. Patient was admitted for an L4-5 laminectomy fusion. This cm had spoken with patient on the phone prior to her admission, helping her to understand the discharge planning process. Cm met with patient at the bedside to re-introduce role and offer support. Cm informed her that she would be working with PT and OT and they would help to make recommendations for her needs at discharge. Patient stated she has already made the decision that she cannot go home, as she lives alone and has no assistance. Patient stated her niece works as an OT at [employer] and she would like to go there. Cm informed her I would send a referral to them for evaluation.     [employer] , [place]

## 2017-06-21 LAB
ERYTHROCYTE [DISTWIDTH] IN BLOOD BY AUTOMATED COUNT: 13.8 % (ref 11.5–14.5)
GLUCOSE BLD STRIP.AUTO-MCNC: 166 MG/DL (ref 65–100)
GLUCOSE BLD STRIP.AUTO-MCNC: 189 MG/DL (ref 65–100)
GLUCOSE BLD STRIP.AUTO-MCNC: 223 MG/DL (ref 65–100)
GLUCOSE BLD STRIP.AUTO-MCNC: 244 MG/DL (ref 65–100)
HCT VFR BLD AUTO: 38 % (ref 35–47)
HGB BLD-MCNC: 12.4 G/DL (ref 11.5–16)
MCH RBC QN AUTO: 30 PG (ref 26–34)
MCHC RBC AUTO-ENTMCNC: 32.6 G/DL (ref 30–36.5)
MCV RBC AUTO: 91.8 FL (ref 80–99)
PLATELET # BLD AUTO: 298 K/UL (ref 150–400)
RBC # BLD AUTO: 4.14 M/UL (ref 3.8–5.2)
SERVICE CMNT-IMP: ABNORMAL
WBC # BLD AUTO: 11.9 K/UL (ref 3.6–11)

## 2017-06-21 PROCEDURE — 74011636637 HC RX REV CODE- 636/637: Performed by: NURSE PRACTITIONER

## 2017-06-21 PROCEDURE — 97161 PT EVAL LOW COMPLEX 20 MIN: CPT

## 2017-06-21 PROCEDURE — G8987 SELF CARE CURRENT STATUS: HCPCS

## 2017-06-21 PROCEDURE — G8978 MOBILITY CURRENT STATUS: HCPCS

## 2017-06-21 PROCEDURE — 74011250637 HC RX REV CODE- 250/637: Performed by: NEUROLOGICAL SURGERY

## 2017-06-21 PROCEDURE — 51798 US URINE CAPACITY MEASURE: CPT

## 2017-06-21 PROCEDURE — 36415 COLL VENOUS BLD VENIPUNCTURE: CPT | Performed by: HOSPITALIST

## 2017-06-21 PROCEDURE — 97116 GAIT TRAINING THERAPY: CPT

## 2017-06-21 PROCEDURE — 97535 SELF CARE MNGMENT TRAINING: CPT

## 2017-06-21 PROCEDURE — 85027 COMPLETE CBC AUTOMATED: CPT | Performed by: HOSPITALIST

## 2017-06-21 PROCEDURE — 74011000250 HC RX REV CODE- 250: Performed by: NEUROLOGICAL SURGERY

## 2017-06-21 PROCEDURE — 74011250636 HC RX REV CODE- 250/636: Performed by: NEUROLOGICAL SURGERY

## 2017-06-21 PROCEDURE — 65270000029 HC RM PRIVATE

## 2017-06-21 PROCEDURE — 97165 OT EVAL LOW COMPLEX 30 MIN: CPT

## 2017-06-21 PROCEDURE — G8988 SELF CARE GOAL STATUS: HCPCS

## 2017-06-21 PROCEDURE — G8979 MOBILITY GOAL STATUS: HCPCS

## 2017-06-21 PROCEDURE — 97530 THERAPEUTIC ACTIVITIES: CPT

## 2017-06-21 PROCEDURE — 82962 GLUCOSE BLOOD TEST: CPT

## 2017-06-21 RX ORDER — CELECOXIB 200 MG/1
200 CAPSULE ORAL DAILY
Status: DISCONTINUED | OUTPATIENT
Start: 2017-06-21 | End: 2017-06-22 | Stop reason: HOSPADM

## 2017-06-21 RX ADMIN — Medication 10 ML: at 14:43

## 2017-06-21 RX ADMIN — OXYCODONE HYDROCHLORIDE AND ACETAMINOPHEN 1 TABLET: 10; 325 TABLET ORAL at 08:12

## 2017-06-21 RX ADMIN — DOCUSATE SODIUM 100 MG: 100 CAPSULE, LIQUID FILLED ORAL at 17:21

## 2017-06-21 RX ADMIN — OXYCODONE HYDROCHLORIDE AND ACETAMINOPHEN 1 TABLET: 10; 325 TABLET ORAL at 12:08

## 2017-06-21 RX ADMIN — OXYCODONE HYDROCHLORIDE AND ACETAMINOPHEN 1 TABLET: 10; 325 TABLET ORAL at 21:50

## 2017-06-21 RX ADMIN — Medication 10 ML: at 07:05

## 2017-06-21 RX ADMIN — PROCHLORPERAZINE EDISYLATE 10 MG: 5 INJECTION INTRAMUSCULAR; INTRAVENOUS at 00:55

## 2017-06-21 RX ADMIN — CELECOXIB 200 MG: 200 CAPSULE ORAL at 12:08

## 2017-06-21 RX ADMIN — INSULIN LISPRO 2 UNITS: 100 INJECTION, SOLUTION INTRAVENOUS; SUBCUTANEOUS at 17:22

## 2017-06-21 RX ADMIN — OXYCODONE HYDROCHLORIDE AND ACETAMINOPHEN 1 TABLET: 10; 325 TABLET ORAL at 17:21

## 2017-06-21 RX ADMIN — DOCUSATE SODIUM 100 MG: 100 CAPSULE, LIQUID FILLED ORAL at 08:12

## 2017-06-21 RX ADMIN — INSULIN LISPRO 3 UNITS: 100 INJECTION, SOLUTION INTRAVENOUS; SUBCUTANEOUS at 12:08

## 2017-06-21 RX ADMIN — GLIPIZIDE 20 MG: 5 TABLET ORAL at 08:12

## 2017-06-21 RX ADMIN — INSULIN LISPRO 2 UNITS: 100 INJECTION, SOLUTION INTRAVENOUS; SUBCUTANEOUS at 21:47

## 2017-06-21 RX ADMIN — CLONIDINE HYDROCHLORIDE 0.1 MG: 0.1 TABLET ORAL at 08:12

## 2017-06-21 RX ADMIN — OXYCODONE HYDROCHLORIDE AND ACETAMINOPHEN 1 TABLET: 10; 325 TABLET ORAL at 00:46

## 2017-06-21 RX ADMIN — SODIUM CHLORIDE 100 ML/HR: 900 INJECTION, SOLUTION INTRAVENOUS at 01:05

## 2017-06-21 RX ADMIN — METOPROLOL SUCCINATE 50 MG: 50 TABLET, EXTENDED RELEASE ORAL at 08:12

## 2017-06-21 RX ADMIN — CLONIDINE HYDROCHLORIDE 0.1 MG: 0.1 TABLET ORAL at 20:59

## 2017-06-21 RX ADMIN — FAMOTIDINE 40 MG: 20 TABLET ORAL at 20:59

## 2017-06-21 RX ADMIN — Medication 10 ML: at 21:01

## 2017-06-21 RX ADMIN — LOSARTAN POTASSIUM: 50 TABLET, FILM COATED ORAL at 08:12

## 2017-06-21 NOTE — PROGRESS NOTES
Problem: Mobility Impaired (Adult and Pediatric)  Goal: *Acute Goals and Plan of Care (Insert Text)  Physical Therapy Goals  Initiated 6/21/2017    1. Patient will move from supine to sit and sit to supine , scoot up and down and roll side to side in bed with modified independence within 4 days. 2. Patient will perform sit to stand with supervision/set-up within 4 days. 3. Patient will ambulate with supervision/set-up for 200 feet with the least restrictive device within 4 days. 4. Patient will ascend/descend 4 stairs with right handrail(s) with supervision/set-up within 4 days. 5. Patient will verbalize and demonstrate understanding of spinal precautions (No bending, lifting greater than 5 lbs, or twisting; log-roll technique; frequent repositioning as instructed) within 4 days. 6. Patient will improve Tinetti score by 4-5 points within 7 days. PHYSICAL THERAPY EVALUATION  Patient: Nader Bermeo (72 y.o. female)  Date: 6/21/2017  Primary Diagnosis: L4-5 STENOSIS, INSTABILITY  Spinal instabilities of lumbar region  Procedure(s) (LRB):  L4-5 LAMINECTOMY WITH INSTRUMENTED FUSION, PLIF, ALLOGRAFT, PEDICLE SCREWS (N/A) 2 Days Post-Op   Precautions:  Fall, Back      ASSESSMENT :  Based on the objective data described below, the patient presents with impaired balance, poor safety awareness, poor recall of information, and overall decline from baseline level of functional mobility. Patient received supine in bed, with HOB elevated (per verbal conversation with RN and MD note, patient cleared for mobility today). Patient initially trying to get OOB impulsively upon PT introduction of self, and required extensive cueing throughout session for safety and to wait for therapist. Explained log roll technique and patient required most assist with trunk to midline. BP stable throughout session, although elevated (170/91 in supine and 166/94 seated in chair at end of session).  No complaints of headache with mobility, although she did mention slight light headedness. Increased trunk sway and impaired coordination of steps to chair. Deferred further gait due to anxiety with movement and need for slow progression after supine in bed all day POD 1. Extensive time spent educating patient on need for tolerating sitting in chair, how to call for RN after 30 minutes in order to reposition or return to bed, and care goals for the day surrounding therapy. This became quite a circular conversation and required directions repeated multiple times (and written on white board). Report given to RN. Recommend rehab at d/c. Patient will benefit from skilled intervention to address the above impairments. Patients rehabilitation potential is considered to be Good  Factors which may influence rehabilitation potential include:   [ ]         None noted  [X]         Mental ability/status- memory impairment/cognition? [ ]         Medical condition  [ ]         Home/family situation and support systems  [X]         Safety awareness  [X]         Pain tolerance/management  [ ]         Other:        PLAN :  Recommendations and Planned Interventions:  [X]           Bed Mobility Training             [X]    Neuromuscular Re-Education  [X]           Transfer Training                   [ ]    Orthotic/Prosthetic Training  [X]           Gait Training                         [ ]    Modalities  [X]           Therapeutic Exercises           [ ]    Edema Management/Control  [X]           Therapeutic Activities            [X]    Patient and Family Training/Education  [ ]           Other (comment):     Frequency/Duration: Patient will be followed by physical therapy  twice daily to address goals. Discharge Recommendations: Rehab  Further Equipment Recommendations for Discharge: TBD at rehab       SUBJECTIVE:   Patient stated I've been in bed three days. I'm sorry.       OBJECTIVE DATA SUMMARY:   HISTORY:    Past Medical History:   Diagnosis Date    Arthritis      Chronic pain      [facility]      GERD (gastroesophageal reflux disease)      Hypertension      Ill-defined condition       ucerative colitis    Ul[facility]ative colitis [facility]      Vertigo       Past Surgical History:   Procedure Laterality Date    HX GI         colonoscopy    HX ORTHOPAEDIC Right 2014     FOOT    HX OTHER SURGICAL         finger surgery, foot surgery     Prior Level of Function/Home Situation: Lives alone, independent  Personal factors and/or comorbidities impacting plan of care: PMH, memory? Home Situation  Home Environment: Private residence  # Steps to Enter: 4  Rails to Enter: Yes  Hand Rails : Right  One/Two Story Residence: Two story, live on 1st floor  Living Alone: Yes  Support Systems: Friends \ neighbors, Family member(s)  Patient Expects to be Discharged to[de-identified] Rehabilitation facility  Current DME Used/Available at Home: [employer], straight     EXAMINATION/PRESENTATION/DECISION MAKING:   Critical Behavior:  Neurologic State: Alert  Orientation Level: Oriented X4  Cognition: Follows commands  Hearing: Auditory  Auditory Impairment: None  Range Of Motion:  AROM: Within functional limits  Strength:    Strength: Within functional limits  Tone & Sensation:   Tone: Normal  Sensation: Intact  Coordination:  Coordination: Within functional limits  Functional Mobility:  Bed Mobility:  Rolling: Contact guard assistance (verbal cues)  Supine to Sit: Moderate assistance;Assist x1;Additional time (trunk to midline)  Scooting: Contact guard assistance  Transfers:  Sit to Stand: Minimum assistance;Assist x1;Additional time  Stand to Sit: Contact guard assistance;Assist x1;Additional time  Stand Pivot Transfers: Minimum assistance     Bed to Chair: Minimum assistance;Assist x1;Additional time (HHA x1)  Balance:   Sitting: Intact; Without support  Standing: Impaired; With support  Standing - Static: Fair  Standing - Dynamic : Fair  Functional Measure:  Tinetti test: Sitting Balance: 1  Arises: 0  Attempts to Rise: 0  Immediate Standing Balance: 0  Standing Balance: 0  Nudged: 0  Eyes Closed: 0  Turn 360 Degrees - Continuous/Discontinuous: 0  Turn 360 Degrees - Steady/Unsteady: 0  Sitting Down: 1  Balance Score: 2  Indication of Gait: 0  R Step Length/Height: 0  L Step Length/Height: 0  R Foot Clearance: 1  L Foot Clearance: 1  Step Symmetry: 0  Step Continuity: 0  Path: 1  Trunk: 0  Walking Time: 0  Gait Score: 3  Total Score: 5         Tinetti Test and G-code impairment scale:  Percentage of Impairment CH     0%    CI     1-19% CJ     20-39% CK     40-59% CL     60-79% CM     80-99% CN      100%   Tinetti  Score 0-28 28 23-27 17-22 12-16 6-11 1-5 0          Tinetti Tool Score Risk of Falls  <19 = High Fall Risk  19-24 = Moderate Fall Risk  25-28 = Low Fall Risk  Tinetti ME. Performance-Oriented Assessment of Mobility Problems in Elderly Patients. [facility]; [id number]. (Scoring Description: PT Bulletin Feb. 10, 1993)     Older adults: Megan Almeida et al, 2009; n = 1000 [facility] elderly evaluated with ABC, REESE, ADL, and IADL)  · Mean REESE score for males aged 69-68 years = 26.21(3.40)  · Mean REESE score for females age 69-68 years = 25.16(4.30)  · Mean REESE score for males over 80 years = 23.29(6.02)  · Mean REESE score for females over 80 years = 17.20(8.32)            G codes: In compliance with CMSs Claims Based Outcome Reporting, the following G-code set was chosen for this patient based on their primary functional limitation being treated: The outcome measure chosen to determine the severity of the functional limitation was the Tinetti with a score of 5/28 which was correlated with the impairment scale.       · Mobility - Walking and Moving Around:               - CURRENT STATUS:    CM - 80%-99% impaired, limited or restricted               - GOAL STATUS:           CL - 60%-79% impaired, limited or restricted               - D/C STATUS: ---------------To be determined---------------      Physical Therapy Evaluation Charge Determination   History Examination Presentation Decision-Making   HIGH Complexity :3+ comorbidities / personal factors will impact the outcome/ POC  MEDIUM Complexity : 3 Standardized tests and measures addressing body structure, function, activity limitation and / or participation in recreation  LOW Complexity : Stable, uncomplicated  Other outcome measures Tinetti 5/28  HIGH       Based on the above components, the patient evaluation is determined to be of the following complexity level: LOW      Pain:  Pain Scale 1: Numeric (0 - 10)  Pain Intensity 1: 7  Pain Location 1: Back  Pain Orientation 1: Posterior  Pain Description 1: Aching  Pain Intervention(s) 1: Medication (see MAR)  Activity Tolerance:   Vitals stable, HTN  Please refer to the flowsheet for vital signs taken during this treatment. After treatment:   [X]         Patient left in no apparent distress sitting up in chair  [ ]         Patient left in no apparent distress in bed  [X]         Call bell left within reach  [X]         Nursing notified  [ ]         Caregiver present  [ ]         Bed alarm activated      COMMUNICATION/EDUCATION:   The patients plan of care was discussed with: Registered Nurse.  [X]         Fall prevention education was provided and the patient/caregiver indicated understanding. [X]         Patient/family have participated as able in goal setting and plan of care. [X]         Patient/family agree to work toward stated goals and plan of care. [ ]         Patient understands intent and goals of therapy, but is neutral about his/her participation. [ ]         Patient is unable to participate in goal setting and plan of care.      Thank you for this referral.  Lacho Del Cid, PT, DPT   Time Calculation: 25 mins

## 2017-06-21 NOTE — PROGRESS NOTES
Problem: Self Care Deficits Care Plan (Adult)  Goal: *Acute Goals and Plan of Care (Insert Text)  Occupational Therapy Goals  Initiated 6/21/2017    1. Patient will perform lower body dressing with supervision/set-up using Reacher and Sock Aid PRN within 7 days. 2. Patient will perform toilet transfer with supervision/set-up using most appropriate DME within 7 days. 3. Patient will perform toileting at supervision/set-up within 7 days. 4. Patient will perform grooming task standing at the sink with supervision within 7 days. 4. Patient will don/doff back brace at modified [place] within 7 days. 5. Patient will verbalize/demonstrate 3/3 back precautions during ADL tasks without cues within 7 days. OCCUPATIONAL THERAPY EVALUATION  Patient: Tracy Whyte (39 y.o. female)  Date: 6/21/2017  Primary Diagnosis: L4-5 STENOSIS, INSTABILITY  Spinal instabilities of lumbar region  Procedure(s) (LRB):  L4-5 LAMINECTOMY WITH INSTRUMENTED FUSION, PLIF, ALLOGRAFT, PEDICLE SCREWS (N/A) 2 Days Post-Op   Precautions:  Fall, Back (HAS BRACE); brace OOB       ASSESSMENT :  Based on the objective data described below, the patient presents with impaired dynamic standing balance, poor safety awareness, impulsive behavior, decreased attention to task, high anxiety, confusion (although oriented to month/year/place), and decreased command following. Pt cleared by nursing for therapy and was received in bed with family friend at bedside. Pt alert and agreeable to work with therapy. Pt initially trying to get OOB impulsively when asking about home environment. Max VC's needed to redirect pt to wait for assistance bofore getting OOB. Pt was able to recall 1/3 back precautions and needed visual and verbal cues to recall the last two. Educated pt on log roll protocol and needed max VC's and simple one steps commands to complete supine to sit with min A and additional time.  Pt appeared anxious and requested to perform log roll once more and needed same amount of assistance. Pt BP elevated (nursing notified). Pt was impulsive when standing and needed mod A for X 1 LOB. Pt demonstrated good UE ROM when standing and needed min A for standing balance. Education provided on AE for LB dressing with pt requiring repetitive instructions and reassurance throughout all tasks. Extensive time spent with educating pt on following 30 min max sitting time and to call nurse to help get back into bed. Pt left in chair, call bell within reach and nursing notified about BP and impulsive behavior. Recommend next session practice donning/doffing brace and work on grooming standing at the sink. Patient will benefit from skilled intervention to address the above impairments. Patients rehabilitation potential is considered to be Good  Factors which may influence rehabilitation potential include:   [ ]             None noted  [X]             Mental ability/status  [X]             Medical condition  [ ]             Home/family situation and support systems  [X]             Safety awareness  [ ]             Pain tolerance/management  [ ]             Other:        PLAN :  Recommendations and Planned Interventions:  [X]               Self Care Training                  [X]        Therapeutic Activities  [X]               Functional Mobility Training    [ ]        Cognitive Retraining  [X]               Therapeutic Exercises           [X]        Endurance Activities  [X]               Balance Training                   [ ]        Neuromuscular Re-Education  [ ]               Visual/Perceptual Training     [X]   Home Safety Training  [X]               Patient Education                 [X]        Family Training/Education  [ ]               Other (comment):     Frequency/Duration: Patient will be followed by occupational therapy 5 times a week to address goals.   Discharge Recommendations: Rehab  Further Equipment Recommendations for Discharge: Rehab TBD SUBJECTIVE:   Patient stated Kyara Borne I think I can try but I don't know how.       OBJECTIVE DATA SUMMARY:   HISTORY:   Past Medical History:   Diagnosis Date    Arthritis      Chronic pain      Diabetes [facility] [address] [facility]      GERD (gastroesophageal reflux disease)      Hypertension      Ill-defined condition       ucerative colitis    Ulcerative colitis ([facility] [address] [facility]      Vertigo       Past Surgical History:   Procedure Laterality Date    HX GI         colonoscopy    HX ORTHOPAEDIC Right 2014     FOOT    HX OTHER SURGICAL         finger surgery, foot surgery        Prior Level of Function/Home Situation: At baseline pt able to complete ADLs independently and lived alone. Expanded or extensive additional review of patient history:      Home Situation  Home Environment: Private residence  # Steps to Enter: 4  Rails to Enter: Yes  Hand Rails : Right  One/Two Story Residence: Two story, live on 1st floor (can stay on the first floor if needed ;has a bathroom on 1st)  Living Alone: Yes  Support Systems: Friends \ neighbors, Family member(s)  Patient Expects to be Discharged to[de-identified] Rehabilitation facility  Current DME Used/Available at Home: [address], straight (suction cup grab bar; )  Tub or Shower Type: Tub/Shower combination  [X]  Right hand dominant             [ ]  Left hand dominant     EXAMINATION OF PERFORMANCE DEFICITS:  Cognitive/Behavioral Status:  Neurologic State: Alert;Confused  Orientation Level: Oriented to person;Oriented to place  Cognition: Decreased attention/concentration;Decreased command following; Impulsive  Perception: Appears intact  Perseveration: Perseverates during ADLS  Safety/Judgement: Decreased awareness of environment;Decreased awareness of need for assistance     Skin: intact      Edema: none noted      Hearing:   Auditory  Auditory Impairment: None      Range of Motion:     AROM: Within functional limits     Strength:     Strength: Generally decreased, functional (Infered through observation )     Coordination:  Coordination: Within functional limits  Fine Motor Skills-Upper: Left Intact; Right Intact    Gross Motor Skills-Upper: Left Intact; Right Intact     Tone & Sensation:     Tone: Normal  Sensation: Intact     Balance:  Sitting: Intact  Standing: Impaired; Without support  Standing - Static: Fair  Standing - Dynamic : Fair     Functional Mobility and Transfers for ADLs:  Bed Mobility:  Rolling: Contact guard assistance (Max verbal cues needed to follow log roll protocol )  Supine to Sit: Minimum assistance  Sit to Supine: Minimum assistance  Scooting: Contact guard assistance     Transfers:  Sit to Stand: Contact guard assistance  Stand to Sit: Minimum assistance  Bed to Chair: Minimum assistance  Toilet Transfer : Minimum assistance     ADL Assessment:  Feeding: Supervision;Setup     Oral Facial Hygiene/Grooming: Supervision;Setup     Bathing: Modified independent     Upper Body Dressing: Setup (Min A to don brace )     Lower Body Dressing: Minimum assistance     Toileting: Minimum assistance     ADL Intervention and task modifications:     Upper Body Dressing Assistance  Orthotics(Brace): Minimum assistance (with VC's needed )     Lower Body Dressing Assistance  Socks: Supervision/set-up  Cues: Verbal cues provided  Adaptive Equipment Used: Reacher;Sock aid     Cognitive Retraining  Safety/Judgement: Decreased awareness of environment;Decreased awareness of need for assistance     Functional Measure:  Barthel Index:      Bathin  Bladder: 10  Bowels: 10  Groomin  Dressin  Feeding: 10  Mobility: 0  Stairs: 0  Toilet Use: 5  Transfer (Bed to Chair and Back): 10  Total: 55         Barthel and G-code impairment scale:  Percentage of impairment CH  0% CI  1-19% CJ  20-39% CK  40-59% CL  60-79% CM  80-99% CN  100%   Barthel Score 0-100 100 99-80 79-60 59-40 20-39 1-19    0   Barthel Score 0-20 20 17-19 13-16 9-12 5-8 1-4 0      The Barthel ADL Index: Guidelines  1.  The index should be used as a record of what a patient does, not as a record of what a patient could do. 2. The main aim is to establish degree of independence from any help, physical or verbal, however minor and for whatever reason. 3. The need for supervision renders the patient not independent. 4. A patient's performance should be established using the best available evidence. Asking the patient, friends/relatives and nurses are the usual sources, but direct observation and common sense are also important. However direct testing is not needed. 5. Usually the patient's performance over the preceding 24-48 hours is important, but occasionally longer periods will be relevant. 6. Middle categories imply that the patient supplies over 50 per cent of the effort. 7. Use of aids to be independent is allowed. Deon Quach., Barthel, DTaniW. (7140). Functional evaluation: the Barthel Index. [facility] (14)2. ALEXIS Friedman, Teri Wilber., Scott Chand., Joya, [address] [place] [address] (1999). Measuring the change indisability after inpatient rehabilitation; comparison of the responsiveness of the Barthel Index and Functional [place] Measure. Journal of Neurology, Neurosurgery, and Psychiatry, 66(4), 769-410. Corina Sierra, N.J.A, RENATO KapadiaJ.CANDELARIO, & Luis Nguyen, M.A. (2004.) Assessment of post-stroke quality of life in cost-effectiveness studies: The usefulness of the Barthel Index and the EuroQoL-5D. Quality of Life Research, 13, 202-17         G codes: In compliance with CMSs Claims Based Outcome Reporting, the following G-code set was chosen for this patient based on their primary functional limitation being treated: The outcome measure chosen to determine the severity of the functional limitation was the Barthel Index with a score of 55/100 which was correlated with the impairment scale.       · Self Care:               - CURRENT STATUS:    CK - 40%-59% impaired, limited or restricted               - GOAL STATUS:           CI - 1%-19% impaired, limited or restricted               - D/C STATUS:                       ---------------To be determined---------------      Occupational Therapy Evaluation Charge Determination   History Examination Decision-Making   LOW Complexity : Brief history review  MEDIUM Complexity : 3-5 performance deficits relating to physical, cognitive , or psychosocial skils that result in activity limitations and / or participation restrictions LOW Complexity : No comorbidities that affect functional and no verbal or physical assistance needed to complete eval tasks       Based on the above components, the patient evaluation is determined to be of the following complexity level: LOW   Pain:  Pain Scale 1: Numeric (0 - 10)  Pain Intensity 1: 7  Pain Location 1: Back  Pain Orientation 1: Posterior  Pain Description 1: Aching  Pain Intervention(s) 1: Medication (see MAR)  Activity Tolerance:   Noted elevated BP during session with pt c/o of mild dizziness throughout session. RN notified and PCT in to reassess BP following session. 06/21/17 1334 06/21/17 1358   BP: (!) 157/96 (!) 155/102   BP 1 Location:     BP Patient Position: Pre-activity;Supine Post activity; Sitting   Pulse: 82 81   Resp:     Temp:     SpO2:     Weight:     Height:       Please refer to the flowsheet for vital signs taken during this treatment. After treatment:   [X] Patient left in no apparent distress sitting up in chair  [ ] Patient left in no apparent distress in bed  [X] Call bell left within reach  [X] Nursing notified  [ ] Caregiver present  [ ] Bed alarm activated      COMMUNICATION/EDUCATION:   The patients plan of care was discussed with: Physical Therapist and Registered Nurse. [ ] Home safety education was provided and the patient/caregiver indicated understanding. [ ] Patient/family have participated as able in goal setting and plan of care.   [X] Patient/family agree to work toward stated goals and plan of care. [ ] Patient understands intent and goals of therapy, but is neutral about his/her participation. [ ] Patient is unable to participate in goal setting and plan of care. This patients plan of care is appropriate for delegation to [facility]. Thank you for this referral.  Mattie Ivey.  Danni Cox

## 2017-06-21 NOTE — PROGRESS NOTES
Problem: Mobility Impaired (Adult and Pediatric)  Goal: *Acute Goals and Plan of Care (Insert Text)  Physical Therapy Goals  Initiated 6/21/2017    1. Patient will move from supine to sit and sit to supine , scoot up and down and roll side to side in bed with modified independence within 4 days. 2. Patient will perform sit to stand with supervision/set-up within 4 days. 3. Patient will ambulate with supervision/set-up for 200 feet with the least restrictive device within 4 days. 4. Patient will ascend/descend 4 stairs with right handrail(s) with supervision/set-up within 4 days. 5. Patient will verbalize and demonstrate understanding of spinal precautions (No bending, lifting greater than 5 lbs, or twisting; log-roll technique; frequent repositioning as instructed) within 4 days. 6. Patient will improve Tinetti score by 4-5 points within 7 days. PHYSICAL THERAPY TREATMENT  Patient: Jonathan Barrow (60 y.o. female)  Date: 6/21/2017  Precautions: Fall, Back (HAS BRACE)      ASSESSMENT:  Patient received transferring self back to bed from chair without assist, despite max education provided for need of assist from nursing. Patient remains with poor carryover of information, although able to state 2/3 back precautions. Upon PT entry, patient did not have brace on. She then was agreeable to gait and ambulated 120 ft in hallway. Gait fluctuated in speed, with multiple LOB episodes (posterior lean, scissor steps, etc), requiring mod Ax1 to correct. Unfortunately, upon return to room, PT saw brace in bag on counter after gait and education provided to patient to always be an advocate for herself and that she should wear brace during all OOB mobility unless ambulating short distances to bathroom. Patient returned to bed. RN alerted that patient was transferring back to bed upon PT entry. RN present and bed alarm placed. Recommend rehab.   Progression toward goals:  [X]      Improving appropriately and progressing toward goals  [ ]      Improving slowly and progressing toward goals  [ ]      Not making progress toward goals and plan of care will be adjusted       PLAN:  Patient continues to benefit from skilled intervention to address the above impairments. Continue treatment per established plan of care. Discharge Recommendations:  Skilled Nursing Facility  Further Equipment Recommendations for Discharge:  TBD at SNF       SUBJECTIVE:   Patient stated Vick Harris was just doing what they said.  regarding getting back to bed; educated that all mobility is recommended to have someone present to assist   The patient stated 2/3 back precautions. Reviewed all 3 with patient. OBJECTIVE DATA SUMMARY:   Functional Mobility Training:  Bed Mobility:  Log Rolling: Contact guard assistance  Supine to Sit: Contact guard assistance  Sit to Supine: Minimum assistance  Scooting: Contact guard assistance  Transfers:  Sit to Stand: Contact guard assistance  Stand to Sit: Contact guard assistance  Stand Pivot Transfers: Minimum assistance     Bed to Chair: Minimum assistance  Ambulation/Gait Training:  Distance (ft): 120 Feet (ft)  Assistive Device: Gait belt  Ambulation - Level of Assistance: Contact guard assistance  Gait Abnormalities: Decreased step clearance; Path deviations;Trunk sway increased  Base of Support: Widened  Speed/Zehra: Fluctuations  Step Length: Left shortened;Right shortened  Pain:  Pain Scale 1: Numeric (0 - 10)  Pain Intensity 1: 7  Pain Location 1: Back  Pain Orientation 1: Posterior  Pain Description 1: Aching  Pain Intervention(s) 1: Medication (see MAR)  Activity Tolerance:   Good  Please refer to the flowsheet for vital signs taken during this treatment.   After treatment:   [ ]  Patient left in no apparent distress sitting up in chair  [X]  Patient left in no apparent distress in bed  [X]  Call bell left within reach  [X]  Nursing notified  [ ]  Caregiver present  [X]  Bed alarm activated COMMUNICATION/COLLABORATION:   The patients plan of care was discussed with: Occupational Therapist and Registered Nurse     Mary Lou Arredondo PT, DPT   Time Calculation: 20 mins

## 2017-06-21 NOTE — PROGRESS NOTES
Pt repeatedly stating that she cant stay flat in bed. Pt herself elevated HOB for a brief  time but she started saying that her head is hurting. She is alert and oriented. Explained her the reason to be flat in bed. Pt agreed to stay flat. Will monitor     043-Pt refusing to take Pain med.

## 2017-06-22 VITALS
WEIGHT: 195.11 LBS | HEIGHT: 65 IN | SYSTOLIC BLOOD PRESSURE: 158 MMHG | HEART RATE: 100 BPM | DIASTOLIC BLOOD PRESSURE: 93 MMHG | TEMPERATURE: 98.1 F | OXYGEN SATURATION: 95 % | BODY MASS INDEX: 32.51 KG/M2 | RESPIRATION RATE: 18 BRPM

## 2017-06-22 LAB
GLUCOSE BLD STRIP.AUTO-MCNC: 157 MG/DL (ref 65–100)
GLUCOSE BLD STRIP.AUTO-MCNC: 246 MG/DL (ref 65–100)
SERVICE CMNT-IMP: ABNORMAL
SERVICE CMNT-IMP: ABNORMAL

## 2017-06-22 PROCEDURE — 97535 SELF CARE MNGMENT TRAINING: CPT

## 2017-06-22 PROCEDURE — 77030012893

## 2017-06-22 PROCEDURE — 82962 GLUCOSE BLOOD TEST: CPT

## 2017-06-22 PROCEDURE — 97116 GAIT TRAINING THERAPY: CPT

## 2017-06-22 PROCEDURE — 74011250637 HC RX REV CODE- 250/637: Performed by: NURSE PRACTITIONER

## 2017-06-22 PROCEDURE — 74011250637 HC RX REV CODE- 250/637: Performed by: NEUROLOGICAL SURGERY

## 2017-06-22 PROCEDURE — 74011636637 HC RX REV CODE- 636/637: Performed by: NURSE PRACTITIONER

## 2017-06-22 RX ORDER — POLYETHYLENE GLYCOL 3350 17 G/17G
17 POWDER, FOR SOLUTION ORAL DAILY
Qty: 10 PACKET | Refills: 0 | Status: SHIPPED | OUTPATIENT
Start: 2017-06-22

## 2017-06-22 RX ORDER — AMOXICILLIN 250 MG
1 CAPSULE ORAL DAILY
Status: DISCONTINUED | OUTPATIENT
Start: 2017-06-22 | End: 2017-06-22 | Stop reason: HOSPADM

## 2017-06-22 RX ORDER — OXYCODONE AND ACETAMINOPHEN 10; 325 MG/1; MG/1
1 TABLET ORAL
Qty: 32 TAB | Refills: 0 | Status: SHIPPED | OUTPATIENT
Start: 2017-06-22 | End: 2022-03-04

## 2017-06-22 RX ORDER — AMOXICILLIN 250 MG
1 CAPSULE ORAL DAILY
Qty: 10 TAB | Refills: 0 | Status: SHIPPED | OUTPATIENT
Start: 2017-06-22

## 2017-06-22 RX ORDER — CYCLOBENZAPRINE HCL 10 MG
10 TABLET ORAL
Qty: 20 TAB | Refills: 0 | Status: SHIPPED | OUTPATIENT
Start: 2017-06-22 | End: 2019-03-16 | Stop reason: CLARIF

## 2017-06-22 RX ORDER — FACIAL-BODY WIPES
10 EACH TOPICAL DAILY PRN
Status: DISCONTINUED | OUTPATIENT
Start: 2017-06-22 | End: 2017-06-22 | Stop reason: HOSPADM

## 2017-06-22 RX ORDER — POLYETHYLENE GLYCOL 3350 17 G/17G
17 POWDER, FOR SOLUTION ORAL DAILY
Status: DISCONTINUED | OUTPATIENT
Start: 2017-06-22 | End: 2017-06-22 | Stop reason: HOSPADM

## 2017-06-22 RX ADMIN — DOCUSATE SODIUM 100 MG: 100 CAPSULE, LIQUID FILLED ORAL at 09:35

## 2017-06-22 RX ADMIN — CLONIDINE HYDROCHLORIDE 0.1 MG: 0.1 TABLET ORAL at 09:37

## 2017-06-22 RX ADMIN — LOSARTAN POTASSIUM: 50 TABLET, FILM COATED ORAL at 09:35

## 2017-06-22 RX ADMIN — GLIPIZIDE 20 MG: 5 TABLET ORAL at 09:38

## 2017-06-22 RX ADMIN — OXYCODONE HYDROCHLORIDE AND ACETAMINOPHEN 1 TABLET: 10; 325 TABLET ORAL at 05:14

## 2017-06-22 RX ADMIN — POLYETHYLENE GLYCOL 3350 17 G: 17 POWDER, FOR SOLUTION ORAL at 09:33

## 2017-06-22 RX ADMIN — CELECOXIB 200 MG: 200 CAPSULE ORAL at 09:36

## 2017-06-22 RX ADMIN — Medication 10 ML: at 05:14

## 2017-06-22 RX ADMIN — INSULIN LISPRO 2 UNITS: 100 INJECTION, SOLUTION INTRAVENOUS; SUBCUTANEOUS at 06:32

## 2017-06-22 RX ADMIN — METOPROLOL SUCCINATE 50 MG: 50 TABLET, EXTENDED RELEASE ORAL at 09:36

## 2017-06-22 RX ADMIN — INSULIN LISPRO 3 UNITS: 100 INJECTION, SOLUTION INTRAVENOUS; SUBCUTANEOUS at 12:42

## 2017-06-22 RX ADMIN — DOCUSATE SODIUM AND SENNOSIDES 1 TABLET: 8.6; 5 TABLET, FILM COATED ORAL at 09:35

## 2017-06-22 NOTE — PROGRESS NOTES
Patient is stable for discharge and will be going to [facility]. Cm met with patient to discuss, answered questions regarding her care at the SNF, what to expect, aftercare, transportation. Patients' friend will be providing transport to [facility]. No other CM needs at this time.   [email or web address]

## 2017-06-22 NOTE — PROGRESS NOTES
Problem: Mobility Impaired (Adult and Pediatric)  Goal: *Acute Goals and Plan of Care (Insert Text)  Physical Therapy Goals  Initiated 6/21/2017    1. Patient will move from supine to sit and sit to supine , scoot up and down and roll side to side in bed with modified independence within 4 days. 2. Patient will perform sit to stand with supervision/set-up within 4 days. 3. Patient will ambulate with supervision/set-up for 200 feet with the least restrictive device within 4 days. 4. Patient will ascend/descend 4 stairs with right handrail(s) with supervision/set-up within 4 days. 5. Patient will verbalize and demonstrate understanding of spinal precautions (No bending, lifting greater than 5 lbs, or twisting; log-roll technique; frequent repositioning as instructed) within 4 days. 6. Patient will improve Tinetti score by 4-5 points within 7 days. PHYSICAL THERAPY TREATMENT  Patient: Jessica Starr (22 y.o. female)  Date: 6/22/2017  Diagnosis: L4-5 STENOSIS, INSTABILITY  Spinal instabilities of lumbar region <principal problem not specified>  Procedure(s) (LRB):  L4-5 LAMINECTOMY WITH INSTRUMENTED FUSION, PLIF, ALLOGRAFT, PEDICLE SCREWS (N/A) 3 Days Post-Op  Precautions: Fall, Back (HAS BRACE)      ASSESSMENT:  Pt received sitting up in chair with brace donned and agreeable to work with PT. Reviewed back precautions with pt and pt able to recall 3/3 precautions. Sit to stand transfer performed with SBA. Ambulated 180 feet with CGA and no AD. Pt displays decreased jennifer and step length bilaterally. Educated pt on scanning environment with head turns during ambulation to challenge pt balance. Pt able to some scanning but still tending to look straight ahead while walking. Returned pt sitting up in chair with all needs met. Due to current decreased functional mobility, endurance, and balance, recommending discharge to SNF to continue to progress pt. Will continue to follow if discharge is delayed. Progression toward goals:  [X]      Improving appropriately and progressing toward goals  [ ]      Improving slowly and progressing toward goals  [ ]      Not making progress toward goals and plan of care will be adjusted       PLAN:  Patient continues to benefit from skilled intervention to address the above impairments. Continue treatment per established plan of care. Discharge Recommendations:  Skilled Nursing Facility  Further Equipment Recommendations for Discharge:  TBD       SUBJECTIVE:   Patient stated Thanks for coming to teach me one more thing.    The patient stated 3/3 back precautions. Reviewed all 3 with patient. OBJECTIVE DATA SUMMARY:   Critical Behavior:  Neurologic State: Alert  Orientation Level: Appropriate for age  Cognition: Decreased attention/concentration  Safety/Judgement: Decreased awareness of need for assistance  Functional Mobility Training:  Bed Mobility:     Brace donned before PT session. Transfers:  Sit to Stand: Stand-by asssistance  Stand to Sit: Stand-by asssistance        Balance:  Sitting: Intact  Standing: Impaired  Standing - Static: Good  Standing - Dynamic : Fair  Ambulation/Gait Training:  Distance (ft): 180 Feet (ft)  Assistive Device: Gait belt  Ambulation - Level of Assistance: Contact guard assistance        Gait Abnormalities: Decreased step clearance; Path deviations        Base of Support: Widened     Speed/Zehra: Pace decreased (<100 feet/min)  Step Length: Left shortened;Right shortened         Educated pt on trying to scan environment during ambulation. Pain:  Pain Scale 1: Numeric (0 - 10)  Pain Intensity 1: 6  Pain Location 1: Back  Pain Orientation 1: Posterior  Pain Description 1: Aching  Pain Intervention(s) 1: Medication (see MAR)  Activity Tolerance:   VSS per pt report and symptoms   Please refer to the flowsheet for vital signs taken during this treatment.   After treatment:   [X]  Patient left in no apparent distress sitting up in chair  [ ]  Patient left in no apparent distress in bed  [X]  Call bell left within reach  [X]  Nursing notified  [X]  Caregiver present  [ ]  Bed alarm activated      COMMUNICATION/COLLABORATION:   The patients plan of care was discussed with: Physical Therapist and Registered Nurse  Regarding student involvement in patient care:  A student participated in this treatment session. Per CMS Medicare statements and APTA guidelines I certify that the following was true:  1. I was present and directly observed the entire session. 2. I made all skilled judgments and clinical decisions regarding care. 3. I am the practitioner responsible for assessment, treatment, and documentation.   Brandy Daniel, ELLIOTT   Time Calculation: 14 mins

## 2017-06-22 NOTE — PROGRESS NOTES
Problem: Mobility Impaired (Adult and Pediatric)  Goal: *Acute Goals and Plan of Care (Insert Text)  Physical Therapy Goals  Initiated 6/21/2017    1. Patient will move from supine to sit and sit to supine , scoot up and down and roll side to side in bed with modified independence within 4 days. 2. Patient will perform sit to stand with supervision/set-up within 4 days. 3. Patient will ambulate with supervision/set-up for 200 feet with the least restrictive device within 4 days. 4. Patient will ascend/descend 4 stairs with right handrail(s) with supervision/set-up within 4 days. 5. Patient will verbalize and demonstrate understanding of spinal precautions (No bending, lifting greater than 5 lbs, or twisting; log-roll technique; frequent repositioning as instructed) within 4 days. 6. Patient will improve Tinetti score by 4-5 points within 7 days. PHYSICAL THERAPY TREATMENT  Patient: Nayla Gallardo (89 y.o. female)  Date: 6/22/2017  Diagnosis: L4-5 STENOSIS, INSTABILITY  Spinal instabilities of lumbar region <principal problem not specified>  Procedure(s) (LRB):  L4-5 LAMINECTOMY WITH INSTRUMENTED FUSION, PLIF, ALLOGRAFT, PEDICLE SCREWS (N/A) 3 Days Post-Op  Precautions: Fall, Back (HAS BRACE)      ASSESSMENT:  Pt received sitting up in chair with brace on and agreeable to work with PT. Sit to stand transfer performed with SBA. Ambulated 140 feet with R railing and CGA. Pt stating she feels 'her balance is off today' and wanting to use hand rail for ambulation. Pt displays some minor path deviations but no overt LOB. Performed balance exercises of decreased CHICHI, semi-tandem, eyes closed, and side stepping. Pt demonstrating no LOB with these activities. Returned pt sitting up in chair with all needs met. Reviewed back precautions with pt and pt able to repeat back 3/3 precautions.   Due to current decreased functional mobility, endurance, and balance, recommending discharge to SNF to continue to progress pt. Will continue to follow. Progression toward goals:  [X]      Improving appropriately and progressing toward goals  [ ]      Improving slowly and progressing toward goals  [ ]      Not making progress toward goals and plan of care will be adjusted       PLAN:  Patient continues to benefit from skilled intervention to address the above impairments. Continue treatment per established plan of care. Discharge Recommendations:  Anoop Elena  Further Equipment Recommendations for Discharge:  TBD        SUBJECTIVE:   Patient stated My balance is just really off.    The patient stated 3/3 back precautions. Reviewed all 3 with patient. OBJECTIVE DATA SUMMARY:   Critical Behavior:  Neurologic State: Alert  Orientation Level: Appropriate for age  Cognition: Decreased attention/concentration  Safety/Judgement: Decreased awareness of need for assistance  Functional Mobility Training:  Bed Mobility:  Log Rolling: Supervision  Supine to Sit: Contact guard assistance     Scooting: Supervision        Brace donned prior to PT session. Transfers:  Sit to Stand: Stand-by asssistance  Stand to Sit: Stand-by asssistance        Bed to Chair: Contact guard assistance                    Balance:  Sitting: Intact  Standing: Impaired  Standing - Static: Good  Standing - Dynamic : Fair  Ambulation/Gait Training:  Distance (ft): 140 Feet (ft)  Assistive Device: Gait belt  Ambulation - Level of Assistance: Contact guard assistance        Gait Abnormalities: Decreased step clearance; Path deviations        Base of Support: Widened     Speed/Zehra: Pace decreased (<100 feet/min)  Step Length: Left shortened;Right shortened                             Therapeutic Exercises: Worked on balance exercises of decreased CHICHI, semi-tandem, eyes closed, and side stepping.      Pain:  Pain Scale 1: Numeric (0 - 10)  Pain Intensity 1: 4  Pain Location 1: Back  Pain Orientation 1: Posterior  Pain Description 1: Aching  Pain Intervention(s) 1: Distraction; Rest  Activity Tolerance:   VSS per pt report and symptoms   Please refer to the flowsheet for vital signs taken during this treatment. After treatment:   [X]  Patient left in no apparent distress sitting up in chair  [ ]  Patient left in no apparent distress in bed  [X]  Call bell left within reach  [X]  Nursing notified  [ ]  Caregiver present  [ ]  Bed alarm activated      COMMUNICATION/COLLABORATION:   The patients plan of care was discussed with: Physical Therapist and Registered Nurse  Regarding student involvement in patient care:  A student participated in this treatment session. Per CMS Medicare statements and APTA guidelines I certify that the following was true:  1. I was present and directly observed the entire session. 2. I made all skilled judgments and clinical decisions regarding care. 3. I am the practitioner responsible for assessment, treatment, and documentation.   ELLIOTT Alan   Time Calculation: 14 mins

## 2017-06-22 NOTE — DISCHARGE SUMMARY
Discharge SNF/Rehab Instructions/LTAC       PATIENT ID: Erika Patel  MRN: [id number]   YOB: 1950    DATE OF ADMISSION: 6/19/2017  8:49 AM    DATE OF DISCHARGE: 6/22/2017    PRIMARY CARE PROVIDER: Gorge Orantes MD       ATTENDING PHYSICIAN: Buster Perez MD  DISCHARGING PROVIDER: Jc Vogt NP         CONSULTATIONS: IP CONSULT TO HOSPITALIST    PROCEDURES/SURGERIES: Procedure(s):  L4-5 LAMINECTOMY WITH INSTRUMENTED FUSION, PLIF, ALLOGRAFT, PEDICLE SCREWS    ADMITTING [address] [place] [address] COURSE:   Severe L4-5 stenosis with mobile spondylolisthesis    The patient has been having back problems for a long time. She also has had orthopedic problems with her right foot, including a bone graft which did not heal properly. The pain in her back is exacerbate by standing or sitting for long periods of time. She is unable to walk long distances and cannot go shopping due to pain. She had a mobile grade 1 spondylolisthesis at L4-5, which does increase with flexion. She also had collapse of the disks at L5-S1 and L3-4 with slight retrolisthesis but no instability. The patient has mechanical back pain with some intermittent neurogenic components. She has had extensive conservative treatment with physical therapy, acupuncture, epidural steroid injections, medication without relief. She underwent an L4-5 laminectomy with instrumented fusion, PLIF, allograft, pedicle screws. The intraoperative findings can be found in Dr. Evita Dawson operative report. Post-operatively, after a short stay in the recovery room, the patient was transferred to the spine floor. She remained flat in bed for the first night as she had a dural tear during surgery. Her HVAC was removed on POD2. Her Ash was also removed on POD2. She did have some issues with hypertension, so the hospitalist was consulted. Her Hgb A1C was also elevated.  The hospitalist suggested she go on insulin to help control her blood sugar, but she refused to consider this. She worked with PT/OT and was deemed a SNF candidate. She was voiding on her own, taking PO well, and ambulating slowly. She was ready for discharge to [facility] on POD3. DISCHARGE DIAGNOSES / PLAN:      1. Lumbar spinal instability                        - s/p fusion L4-5 06/19                        - PT/OT                        - Pain control                        - Brace when out of bed                        - F/U with Dr. Frank Saucedo in 2 weeks                        - see discharge instructions for wound care  2. HTN                        - Pt states she has white coat syndrome                        - Restart metoprolol, clonidine, losartan, HCTZ from home                        - F/U with PCP after discharge from rehab  3. Diabetes mellitus, type 2                        - Hgb A1C 8.5                        - cont glipizide from home                        - Restart metformin from home                        - F/U with PCP after discharge from SNF                        - Insulin was recommended by the hospitalist, but the pt declined       PENDING TEST RESULTS:   At the time of discharge the following test results are still pending: None    FOLLOW UP APPOINTMENTS:    Follow-up Information     Follow up With Details Comments [address]   [address]  [address] Hoodchidi Baca MD  after discharge from rehab [address] [address]      Mario Alberto Chaparro MD Schedule an appointment as soon as possible for a visit in 2 weeks For wound re-check [facility] [id number] [facility]: None    DIET: Diabetic consistent carb diet    ACTIVITY: No driving. Brace when out of bed. No bending, lifting, twisting. WOUND CARE: Change dressing daily x 7 days. Can shower.  Keep dressing on while in shower and place new dressing after shower. No need for ointment. The steri-strips will fall off on their own within 10-14 days. EQUIPMENT needed: None      DISCHARGE MEDICATIONS:   See Medication Reconciliation Form      NOTIFY YOUR PHYSICIAN FOR ANY OF THE FOLLOWING:   Fever over 101 degrees for 24 hours. Chest pain, shortness of breath, fever, chills, nausea, vomiting, diarrhea, change in mentation, falling, weakness, bleeding. Severe pain or pain not relieved by medications. Or, any other signs or symptoms that you may have questions about. DISPOSITION:    Home With:   OT  PT  HH  RN      X SNF/Inpatient Rehab/LTAC    Independent/assisted living    Hospice    Other:       PATIENT CONDITION AT DISCHARGE: Stable    Functional status    Poor    X Deconditioned     Independent      Cognition    X Lucid     Forgetful     Dementia      Catheters/lines (plus indication)    Ash     PICC     PEG    X None      Code status   X  Full code     DNR      PHYSICAL EXAMINATION AT DISCHARGE:    Gen:NAD. Neuro: A&Ox3. Follows commands. Speech clear. Affect normal.  BARRAZA spontaneously. Sensation intact  Gait deferred. Skin: Dressing C/D/I with minimal drainage on it  Heart RRR  Lungs CTA BL  Abd soft, NT  Ext no edema      CHRONIC MEDICAL DIAGNOSES:  Problem List as of 6/22/2017  Never Reviewed          Codes Class Noted - Resolved    Spinal instabilities of lumbar region ICD-10-CM: M53.2X6  ICD-9-CM: 724.9  6/19/2017 - Present                CDMP Checked: Yes X     PROBLEM LIST Updated:   Yes X         Signed:   Troy Love NP  6/22/2017  2:09 PM

## 2017-06-22 NOTE — PROGRESS NOTES
Much better. Mobilized yesterday. No h/a or nausea. abd full. Min flatus. Cont mobilization.  Encourage bm.  snf this afternoon or am

## 2017-06-22 NOTE — PROGRESS NOTES
Bedside and Verbal shift change report given to [address] (oncoming nurse) by Frances Harmon (offgoing nurse). Report included the following information SBAR, Kardex and MAR.

## 2017-06-22 NOTE — DISCHARGE INSTRUCTIONS
Patient meets criteria for   BUNDLED PAYMENT   for Care Improvement Initiative Criteria    Contact Information for Orthopedic Nurse Navigator:      CRISS Marie, RN-BC  F:[phone]  L:[phone]  B:[phone]    After Hospital Care Plan:  Discharge Instructions Lumbar Fusion Surgery   Dr. Yahaira Clark     Patient Name: Claire Camp    Date of procedure: 6/19/2017  Date of discharge: 6/22/2017    Procedure: Procedure(s):  L4-5 LAMINECTOMY WITH INSTRUMENTED FUSION, PLIF, ALLOGRAFT, PEDICLE SCREWS  PCP: Jaiden Lino MD    Follow up appointments  -follow up with Dr. Yahaira Clark in 2 weeks. Call [phone] to make an appointment as soon as you get home from the hospital.    When to call your Spine Surgeon:  -Signs of infection-if your incision is red; continues to have drainage; drainage has a foul odor or if you have a persistent fever over 101 degrees for 24 hours  -Nausea or vomiting, severe headache  -Loss of bowel or bladder function, inability to urinate  -Changes in sensation in your arms or legs (numbness, tingling, loss of color)  -Increased weakness-greater than before your surgery  -Severe pain or pain not relieved by medications  -Signs of a blood clot in your leg-calf pain, tenderness, redness, swelling of lower leg    When to call your Primary Care Physician:  -Concerns about medical conditions such as diabetes, high blood pressure, asthma, congestive heart failure  -Call if blood sugars are elevated, persistent headache or dizziness, coughing or congestion, constipation or diarrhea, burning with urination, abnormal heart rate    When to call 911 and go to the nearest emergency room:  -Acute onset of chest pain, shortness of breath, difficulty breathing    Activity  -You are going home a well person, be as active as possible. Your only exercise should be walking.   Start with short frequent walks and increase your walking distance each day.  -Limit the amount of time you sit to 20-30 minute intervals. Sitting for prolonged periods of time will be uncomfortable for you following surgery.  -Do NOT lift anything over 5 pounds  -Do NOT do any straining, twisting or bending  -When you are in bed, you may lay on your back or on either side. Do NOT lie on your stomach    Brace  -If you have a back brace, you should wear your brace at all times when you are out of bed. Do not wear the brace while in bed or showering.  -Remember to always wear a cotton t-shirt underneath your brace.  -Do not bend or twist when your brace is off. Diet  -Resume usual diet; drink plenty of fluids; eat foods high in fiber  -It is important to have regular bowel movements. Pain medications may cause constipation. You may want to take a stool softener (such as Senokot-S or Colace) to prevent constipation.   -If constipation occurs, take a laxative (such as Dulcolax tablets, Milk of Magnesia, or a suppository). Laxatives should only be used if the above preventable measures have failed and you still have not had a bowel movement after three days    Driving  -You may not drive or return to work until instructed by your physician. However, you may ride in the car for short periods of time. Incision Care  -You may take brief showers but do not run the water run directly onto the wound. After showering or bathing, remove the wet dressing and gently blot the wound dry with a soft towel.  -Do not rub or apply any lotions or ointments to your incision site.   -Do not soak or scrub your wound  -Keep a dry dressing (guaze and paper tape) on your incision and have it changed daily for 14 days after surgery; more often if your incision is draining. Have your caregiver wash their hands thoroughly before changing your dressing.  -You will have absorbable sutures and steristrips (white tape) on your incision. Leave the steristrips on until they fall off. Showering  -You may shower in approximately 2 days after your surgery. -Leave the dressing on during your shower. Do NOT allow the water to run directly onto your dressing. Once you get out of the shower, pat the area dry with a towel and put on a dry dressing.  -Reminder- your brace can be removed while showering. Remember to not bend or twist while your brace is off.    -Do not take a tub bath. Preventing blood clots  -You have been given T.E.D. stockings to wear. Continue to wear these for 7 days after your discharge. Put them on in the morning and take them off at night.    -They are used to increase your circulation and prevent blood clots from forming in your legs  -T. E.D. stockings can be machine washed, temperature not to exceed 160° F (71°C) and machine dried for 15 to 20 minutes, temperature not to exceed 250° F (121°C). Pain management  -Take pain medication as prescribed; decrease the amount you use as your pain lessens  -DO not wait until you are in extreme pain to take your medication.  -Avoid alcoholic beverages while taking pain medication    Pain Medication Safety  DO:  -Read the Medication Guide   -Take your medicine exactly as prescribed   -Store your medicine away from children and in a safe place   -Call your healthcare provider for medical advice about side effects.  -Please be aware that many medications contain Tylenol. We do not want you to over medicate so please read the information below as a guide. Do not take more than 4 Grams of Tylenol in a 24 hour period.   (There are 1000 milligrams in one Gram)                                                                                                                                                                                                                                                                                                                                                                  Percocet contains 325 mg of Tylenol per tablet (do not take more than 12 tablets in 24 hours)  Lortab contains 500 mg of Tylenol per tablet (do not take more than 8 tablets in 24 hours)  Norco contains 325 mg of Tylenol per tablet (do not take more than 12 tablets in 24 hours). DO NOT:  -Do not give your medicine to others   -Do not take medicine unless it was prescribed for you   -Do not stop taking your medicine without talking to your healthcare provider   -Do not break, chew, crush, dissolve, or inject your medicine. If you cannot swallow your medicine whole, talk to your healthcare provider.  -Do not drink alcohol while taking this medicine  -Do not take anti-inflammatory medications or aspirin unless instructed by your   physician.

## 2017-06-22 NOTE — PROGRESS NOTES
Problem: Self Care Deficits Care Plan (Adult)  Goal: *Acute Goals and Plan of Care (Insert Text)  Occupational Therapy Goals  Initiated 6/21/2017    1. Patient will perform lower body dressing with supervision/set-up using Reacher and Sock Aid PRN within 7 days. 2. Patient will perform toilet transfer with supervision/set-up using most appropriate DME within 7 days. 3. Patient will perform toileting at supervision/set-up within 7 days. 4. Patient will don/doff back brace at modified [place] within 7 days. 5. Patient will verbalize/demonstrate 3/3 back precautions during ADL tasks without cues within 7 days. OCCUPATIONAL THERAPY TREATMENT  Patient: Kar White (80 y.o. female)  Date: 6/22/2017  Diagnosis: L4-5 STENOSIS, INSTABILITY  Spinal instabilities of lumbar region <principal problem not specified>  Procedure(s) (LRB):  L4-5 LAMINECTOMY WITH INSTRUMENTED FUSION, PLIF, ALLOGRAFT, PEDICLE SCREWS (N/A) 3 Days Post-Op  Precautions: Fall, Back (HAS BRACE) No bending, no lifting greater than 5 lbs, no twisting, log-roll technique, repositioning every 20-30 min except when sleeping,  brace when OOB      ASSESSMENT:  Pt cleared by nursing for therapy. Pt received in bed, alert and agreeable to work with therapy. Pt making good progress however continues to have overall decreased dynamic standing balance, back pain 4-5/10, slight confusion and needing simple one step commands to complete tasks. Pt able to recall 3/3 back precautions with additional time needed. Pt performed log roll protocol with supervision and min to mod VCs. Pt performed UB dressing (including brace) with supervision/set up sitting EOB. Education on reacher was provided to assist with LB dressing. LB dressing was overall min A to complete sitting EOB and CGA when standing to pull pants up. BP elevated and nursing notified. Pt was able to follow back precautions and asked appropriate questions to complete all tasks.  Bathroom and functional mobility was overall CGA and toileting with min A. Pt left sitting up in chair with NAD, call bell within reach and nursing present. Pt adament about not having the chair alarm, nursing present in room and discontinued use of chair alarm. Recommend continuing LB dressing using AE and grooming at the sink for standing balance. Progression toward goals:  [X]          Improving appropriately and progressing toward goals  [ ]          Improving slowly and progressing toward goals  [ ]          Not making progress toward goals and plan of care will be adjusted       PLAN:  Patient continues to benefit from skilled intervention to address the above impairments. Continue treatment per established plan of care. Discharge Recommendations:  Rehab  Further Equipment Recommendations for Discharge:  Rehab TBD        SUBJECTIVE:   Patient stated Do you want me to get up now?   The patient stated 3/3 back precautions with additional time needed. Reviewed all 3 with patient. OBJECTIVE DATA SUMMARY:   Cognitive/Behavioral Status:  Neurologic State: Alert  Orientation Level: Appropriate for age  Cognition: Decreased attention/concentration  Safety/Judgement: Decreased awareness of need for assistance     Functional Mobility and Transfers for ADLs:  Bed Mobility:  Rolling: Supervision  Supine to Sit: Contact guard assistance  Scooting: Supervision     Transfers:  Sit to Stand: Contact guard assistance  Functional Transfers  Bathroom Mobility: Contact guard assistance  Toilet Transfer : Contact guard assistance   Ambulating w/o AD; BSC over the toilet in bathroom   Balance:  Sitting: Intact  Standing: Impaired; Without support  Standing - Static: Good  Standing - Dynamic : Fair     ADL Intervention and Instruction:     Grooming  Washing Hands: Contact guard assistance   Standing at the sink     Upper Body Dressing Assistance  Bra: Supervision/set-up  Orthotics(Brace): Supervision/set-up (Min VCs provided for readjusting )  Pullover Shirt: Supervision/set-up  Cues: Verbal cues provided     Lower Body Dressing Assistance  Underpants: Minimum assistance  Pants With Elastic Waist: Minimum assistance  Cues: Verbal cues provided  Adaptive Equipment Used: Reacher     Toileting  Toileting Assistance: Minimum assistance  Clothing Management: Minimum assistance  Cues: Verbal cues provided  Adaptive Equipment: Grab bars     Cognitive Retraining  Safety/Judgement: Decreased awareness of need for assistance      Dressing brace: Patient instructed and demonstrated to don/doff velcro on brace using dominant side, keeping non-dominant side intact. Dressing lower body: Patient instructed on the benefits to remain seated to don all clothing to increase independence with precautions and pain management. Toileting: Patient instructed on the benefits of using flushable wet wipes and toilet tongs if decreased reach or pain for hollis care. Also, the benefits of a reacher to aid in clothing management. Home safety: Patient instructed and indicated understanding on home modifications and safety (raise height of ADL objects, appropriate height of chair surfaces, recliner safety, change of floor surfaces, clear pathways) to increase independence and fall prevention. Standing: Patient instructed and indicated understanding to walk up to sink/counter top/surfaces, step into walker, square off while using objects, slide objects along surfaces, to increase adherence to back precautions and fall prevention. Patient instructed to increase amount of time standing in order to increase independence and tolerance with ADLs. Pain:  Pain Scale 1: Numeric (0 - 10)  Pain Intensity 1: 4  Pain Location 1: Back  Pain Orientation 1: Posterior  Pain Description 1: Aching  Pain Intervention(s) 1: Distraction; Rest  Activity Tolerance:   Pt's BP elevated however NAD and no c/o of dizziness. Pt tolerated session well with no reports on increased pain. Nursing present at the end of session and aware of pt's status. 06/22/17 0906 06/22/17 0956   BP: (!) 155/91 (!) 182/105   BP 1 Location:   Right arm   BP Patient Position: Pre-activity;Supine Post activity; Sitting   Pulse: 88 (!) 105   Resp:       Temp:       SpO2: 92% 94%   Weight:       Height:             Please refer to the flowsheet for vital signs taken during this treatment. After treatment:   [X] Patient left in no apparent distress sitting up in chair  [ ] Patient left in no apparent distress in bed  [X] Call bell left within reach  [X] Nursing notified and present   [ ] Caregiver present  [ ] Bed alarm activated *RN discontinuing chair alarm      COMMUNICATION/COLLABORATION:   The patients plan of care was discussed with: Physical Therapist and Registered Nurse     Luke Haney  [place]  Time Calculation: 40 mins

## 2017-06-22 NOTE — OP NOTES
[address] [facility], [address]   OP NOTE       Name:  Mike Mott   MR#:  [id number]   :  1950   Account #:  [de-identified]    Surgery Date:  2017   Date of Adm:  2017       PREOPERATIVE DIAGNOSIS: Severe L4-L5 stenosis with mobile   spondylolisthesis. POSTOPERATIVE DIAGNOSIS: Severe L4-L5 stenosis with mobile   spondylolisthesis. PROCEDURES PERFORMED   1. Complete L4-L5 laminectomy and medial facetectomies. 2. Caudal L3 laminectomy. 3. Posterior instrumented fusion lumber 4, lumbar 5, using Medtronic   Elevate posterior lumbar interbody fusion graft packed with autologous   bone and small Infuse posterolateral fusion with Medtronic Solera   pedicle screws and local autologous bone. SURGEON: Maryann Mojica. Sohna Tao MD    ASSISTANT: [facility]. ANESTHESIA: General endotracheal anesthesia. ESTIMATED BLOOD LOSS: 400 mL. COMPLICATIONS: Incidental durotomy, repaired primarily. SPECIMENS REMOVED: None. OPERATIVE INDICATIONS: This is a 52-year-old female with   progressive neurogenic claudication, back pain refractory to   nonoperative care. Workup revealed multilevel spondylosis but at L4-  L5 there was severe stenosis with a mobile spondylolisthesis. After   discussing treatment options and risks of surgery, informed consent   was obtained. OPERATION DETAIL: The patient taken to the operating room, placed   under general endotracheal anesthesia. All necessary lines, monitors   placed, given appropriate dose of IV antibiotics. SCDs and Ash was   placed. She was placed prone on the [facility] table. All pressure   points were padded. The lumbosacral region was prepped and draped   in standard sterile fashion. An incision was made in the midline from L3 to L5 with a skin knife,   carried down with Bovie electrocautery in the avascular midline plane.    Subperiosteal dissection was performed on the lamina bilaterally of L3,   L4, and L5. Fluoroscopy was used for localization. The dissection was   carried out over the facets of L4-5 which were arthropathic with   significant cystic degeneration of the facets. These were cleaned off. The transverse processes of L4 and L5 were cleaned off of soft tissue,   as were the facet joints. Leksell rongeurs, curettes and Kerrisons were   used to perform complete laminectomy of L4 and L5. There was   significant stenosis at this level in the lateral recess and spreading of   the facets with a facet effusion. Medial facetectomy was performed to   widely decompress the L4 and L5 nerve roots at this level. There was   some adhesion of soft tissue to the dura, which was left in place in the   midline. As I rongeured up underneath the rostral part of L4, I did   encounter an incidental durotomy. There was some egress of cerebral   spinal fluid. The neural elements were packed back in the thecal sac. I   more widely decompressed and then performed laminectomy of the   caudal L3. I then performed primary closure of the durotomy using 6-0   [place]-Otoniel suture. This was later covered with muscle and DuraSeal.     Diskectomy was performed on the left-hand side at L4-L5. The disk   was removed and the endplates were decorticated. Using the   Medtronic interbody fusion set, I then prepped the endplates. I used a   Medtronic Elevate expandable PLIF cage. Prior to placing the cage, I   packed 1/4 of a small Medtronic Infuse and local autologous bone in   the anterior disk space. I then packed the PLIF cage with the other 1/2   of the Medtronic Infuse and local autologous bone. I used a 8-12 mm   expandable cage. The cage was placed under fluoroscopic and direct   visualization and countersunk. I then expanded the cage until I got   good apposition of the endplates. The pedicles of L4 and L5 were then   cannulated. The posterolateral gutters were decorticated of soft tissue.    Where decorticated bone is where the L4-L5 facets, I then packed with   the local autologous bone that had been cleaned off and morselized in   the posterolateral gutters. The 6.5 x 50 mm pedicle screws were then   placed bilaterally at L4 and L5 with good purchase of the screws. They   appeared to be in good position on AP and lateral fluoroscopy. Small   curved rods were then placed and lined up in the polyaxial heads and   locked into place. I then placed a piece of muscle over the durotomy   closure and used DuraSeal to cover the dura. A piece of Gelfoam was   also placed over this. A Hemovac drain was placed, brought out   through a separate stab incision. Retractors were removed. The wound was then closed using 0 Vicryl to close the deep fascial   layer, 2-0 Vicryl in the deep dermal layer, and running 4-0 Monocryl in   a subcuticular fashion. Wounds were cleaned, dried, dressed with   sterile dressing. The patient was then flipped over, extubated, taken to recovery room   in stable condition. MD ANA Elkins / Mulu January   D:  06/21/2017   18:08   T:  06/22/2017   01:29   Job #:  [id number]

## 2017-06-23 NOTE — PROGRESS NOTES
Removed IV with tip intact. Reviewed Discharge instructions that would be handed to [facility] where pt will be receiving rehab services. Pt was discharged in the care of her friend who was transporting her to [facility]. Called at [address] and 1933 to give report to an RN at [facility]. Calls were answered by / and transferred to rehab area and were not answered after waiting 5-10 minutes.

## 2017-06-23 NOTE — PROGRESS NOTES
Spiritual Care Partner Volunteer visited patient in [place] on 6/22/17. Documented by:  GRECIA Kaur

## 2017-06-23 NOTE — NURSE NAVIGATOR
[address]  SBAR Bundled Payment Handoff     FROM:                                TO: [place]                                                      ([facility] or Facility name)  [address]  [address] [place] [address]  Dept: [id number]: [phone]                                      Room#:  534/01                                                    Nurse Navigator:  Aparna Humphries RN           SITUATION      ASAScore: ASA 3 - Patient with moderate systemic disease with functional limitations    Admitted:  6/19/2017  Hospital Day: 4      Attending Provider:  No att. providers found     Consultations:  IP CONSULT TO HOSPITALIST    PCP:  Bradford Rick MD   [phone]     Admitting Dx:  L4-5 STENOSIS, INSTABILITY  Spinal instabilities of lumbar region       Active Problems:    Spinal instabilities of lumbar region (6/19/2017)      4 Days Post-Op of   Procedure(s):  L4-5 LAMINECTOMY WITH INSTRUMENTED FUSION, PLIF, ALLOGRAFT, PEDICLE SCREWS   BY: Ariela Block MD             ON: 6/19/2017                  Code Status: No Order             Advance Directive? Received (Send w/patient)     Isolation:  There are currently no Active Isolations       MDRO: No current active infections    BACKGROUND     Allergies:  No Known Allergies    Past Medical History:   Diagnosis Date    Arthritis     Chronic pain     [facility]     GERD (gastroesophageal reflux disease)     Hypertension     Ill-defined condition     ucerative colitis    Ulcerative colitis ([facility] [address] [facility]     Vertigo        Past Surgical History:   Procedure Laterality Date    HX GI      colonoscopy    HX ORTHOPAEDIC Right 2014    FOOT    HX OTHER SURGICAL      finger surgery, foot surgery       Prior to Admission Medications   Prescriptions Last Dose Informant Patient Reported? Taking?    cloNIDine HCl (CATAPRES) 0.1 mg tablet 6/19/2017 at 0630  Yes Yes   Sig: Take 0.1 mg by mouth two (2) times a day. glipiZIDE (GLUCOTROL) 10 mg tablet 6/18/2017 at Unknown time  Yes Yes   Sig: Take 20 mg by mouth daily. losartan-hydroCHLOROthiazide (HYZAAR) 100-25 mg per tablet 6/18/2017 at Unknown time  Yes Yes   Sig: Take 1 Tab by mouth daily. metFORMIN (GLUCOPHAGE) 1,000 mg tablet 6/18/2017 at Unknown time  Yes Yes   Sig: Take 1,000 mg by mouth two (2) times daily (with meals). metoprolol succinate (TOPROL-XL) 50 mg XL tablet 6/19/2017 at 630  Yes Yes   Sig: Take 50 mg by mouth daily. raNITIdine (ZANTAC) 150 mg tablet 6/19/2017 at 0630  Yes Yes   Sig: Take 150 mg by mouth daily. Facility-Administered Medications: None       Vaccinations: There is no immunization history on file for this patient. ASSESSMENT   Age: 78 y.o. Gender: female        Height: Height: 5' 5\" (165.1 cm)                    Weight:Weight: 88.5 kg (195 lb 1.7 oz)     No data found. Active Orders   There are no active orders of the following type(s): Diet.        Orientation: Orientation Level: Appropriate for age    Active Lines/Drains:  (Peg Tube / Ash / CL or S/L?):no    Urinary Status: Voiding      Last BM: Last Bowel Movement Date: 06/18/17     Skin Integrity: Incision (comment) (back surgery)   Wound Back Posterior;Medial-DRESSING STATUS: Clean, dry, and intact    Wound Back Posterior;Medial-DRESSING TYPE: Dry dressing    Mobility: Slightly limited   Weight Bearing Status: WBAT (Weight Bearing as Tolerated)      Gait Training  Assistive Device: Gait belt  Ambulation - Level of Assistance: Contact guard assistance  Distance (ft): 180 Feet (ft)       Lab Results   Component Value Date/Time    Glucose 191 06/07/2017 10:10 AM    Hemoglobin A1c 8.5 06/20/2017 04:02 AM    INR 1.0 06/07/2017 10:10 AM    HGB 12.4 06/21/2017 11:22 AM    HGB 12.7 06/20/2017 04:02 AM    HGB 13.0 06/19/2017 03:56 PM    HGB 14.7 06/07/2017 10:10 AM       Readmission Risks:  Score:         RECOMMENDATION     See After Visit Summary (AVS) for:  · Discharge instructions  · After [address]   · Medication Reconciliation          [facility] Orthopaedic Nurse Navigator  Lannie Mohs, BSN, RN-BC       Office  [phone]  Cell      [phone]  Fax      [phone]  [email or web address]             . [email or web address]

## 2018-06-18 ENCOUNTER — HOSPITAL ENCOUNTER (OUTPATIENT)
Dept: DIABETES SERVICES | Age: 68
Discharge: HOME | End: 2018-06-18
Payer: MEDICARE

## 2018-06-18 DIAGNOSIS — E11.9 SEVERE DIABETES MELLITUS (HCC): ICD-10-CM

## 2018-06-18 PROCEDURE — G0109 DIAB MANAGE TRN IND/GROUP: HCPCS

## 2018-07-02 ENCOUNTER — HOSPITAL ENCOUNTER (OUTPATIENT)
Dept: DIABETES SERVICES | Age: 68
Discharge: HOME | End: 2018-07-02
Payer: MEDICARE

## 2018-07-02 DIAGNOSIS — E11.9 SEVERE DIABETES MELLITUS (HCC): ICD-10-CM

## 2018-07-02 PROCEDURE — G0109 DIAB MANAGE TRN IND/GROUP: HCPCS

## 2018-08-06 ENCOUNTER — HOSPITAL ENCOUNTER (OUTPATIENT)
Dept: DIABETES SERVICES | Age: 68
Discharge: HOME | End: 2018-08-06
Payer: MEDICARE

## 2018-08-06 DIAGNOSIS — E11.9 SEVERE DIABETES MELLITUS (HCC): ICD-10-CM

## 2018-08-06 PROCEDURE — G0109 DIAB MANAGE TRN IND/GROUP: HCPCS

## 2018-12-20 ENCOUNTER — HOSPITAL ENCOUNTER (OUTPATIENT)
Dept: DIABETES SERVICES | Age: 68
Discharge: HOME | End: 2018-12-20
Payer: MEDICARE

## 2018-12-20 DIAGNOSIS — E11.9 SEVERE DIABETES MELLITUS (HCC): ICD-10-CM

## 2018-12-20 PROCEDURE — G0108 DIAB MANAGE TRN  PER INDIV: HCPCS

## 2018-12-20 NOTE — DIABETES MGMT
Refresher Education  Initial Visit    Pt attended appt today for refresher education. Pt completed her DSME class series with us in 2018. Her last known A1c was 9.7% (18). We obtained her A1c in the office today and it was 9.9%. She reports nothing about her medication regimen has changed but admits that she does not take it as prescribed. She stated \"I don't miss my medication, I take it every day but I don't always take it twice a day like I'm supposed to\". Per medication review, she is supposed to be taking Metformin, 1000 mg, BID and Glipizide, 20 mg, BID. Pt admits today that she has not been monitoring her blood sugars since she completed her class series back in August. When asked why she is not doing this, she simply stated, \"I just don't want to. \"    Pt spent a majority of our appt ruminating on where he blood sugars were prior to her back surgery and how she was well controlled then but not now. Attempted to point out multiple factors that are probably affecting her A1c at this time including reduced ability to exercise, reported pain and reported high emotional stress. Pt indicated during our conversation that she really is at a point that she \"just does not care what happens\". She expresses anger that her father  from complications related to diabetes and that \"he gave this to me\". She indicates that she is working toward cleaning out her home \"in case something happens to me\". Directly asked pt if she is having suicidal thoughts at this time. She directly stated, \"well not right now\". When asked if she had a plan she states she does not. However, she continued to ruminate about how she \"knows\" no one cares about her and she has no one around to help her do things at this point in time. When pt was asked how educator could be of assistance she stated, \"I don't know\". Tried to re-direct patient as much as I could and provide emotional support as able.  We were able to briefly discuss current medications, how they work, her current doses and when they should be taken. We also reviewed how to monitor BG for purposeful results. I highly encouraged pt to begin taking her medications as they are prescribed and to begin monitoring BG at least once daily. Encouraged pt to consider reaching out to her PCP for resources and possible referral for talk therapy to assist with high emotional stress. She states, Yusra Pollard would just tell me what you're saying. I already know what I need to do, I just don't care. \"     At conclusion of appt, pt states she will try to begin doing what is discussed. She indicates she may be interested in following up in the future. She was provided my contact information. I re-iterated that I am a part of her support team and want to help as I can.     ~25 minutes was spent in direct counseling of pt's diabetes self management. I followed up with Dr. Cory Johnson office regarding my concerns about her emotional distress. I spoke with his nurse, Vaibhav Roberts, and requested that they reach out to her to check in and provide resources if possible. Their support is appreciated in this process.     Belkys Amaya RD

## 2019-01-11 ENCOUNTER — HOSPITAL ENCOUNTER (OUTPATIENT)
Dept: DIABETES SERVICES | Age: 69
Discharge: HOME | End: 2019-01-11
Payer: MEDICARE

## 2019-01-11 DIAGNOSIS — E11.9 SEVERE DIABETES MELLITUS (HCC): ICD-10-CM

## 2019-01-11 PROCEDURE — G0109 DIAB MANAGE TRN IND/GROUP: HCPCS

## 2019-01-11 NOTE — DIABETES MGMT
01/11/19 Dear Dr. Neptali Rousseua, Thank you for your kind referral. Since your patient, Kassandra Hinojosa completed their diabetes education classes six months ago, they were invited back today for an additional  session which included a virtual grocery store tour and tips on heart healthy eating at [facility]. Data from visit: 
 
HgbA1c: 
 
1/11/2019 9.8 
12/20/2018 9.9 
8/6/2018 9.7 6/18/2018 10.1 
8/26/2003 6.2 
5/15/2003 6 Increased risk for diabetes: 5.7-6.4%   Diabetes:  >6.4%. Glycemic control for adults with diabetes: <7%  Elderly or multiple medical conditions: <8% If you have any questions please do not hesitate to call the [facility] at [phone] Sincerely, Rickey Kingston, [facility], [address] [place], [place] [address] Phone: [phone] Fax : [phone]

## 2019-03-15 ENCOUNTER — HOSPITAL ENCOUNTER (EMERGENCY)
Age: 69
Discharge: HOME | End: 2019-03-16
Attending: EMERGENCY MEDICINE
Payer: MEDICARE

## 2019-03-15 DIAGNOSIS — T14.8XXA MUSCLE CONTUSION: ICD-10-CM

## 2019-03-15 DIAGNOSIS — M54.50 ACUTE LEFT-SIDED LOW BACK PAIN WITHOUT SCIATICA: ICD-10-CM
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
## 2019-03-15 PROCEDURE — 99283 EMERGENCY DEPT VISIT LOW MDM: CPT

## 2019-03-16 ENCOUNTER — APPOINTMENT (OUTPATIENT)
Dept: GENERAL RADIOLOGY | Age: 69
End: 2019-03-16
Attending: EMERGENCY MEDICINE
Payer: MEDICARE

## 2019-03-16 VITALS
BODY MASS INDEX: 32.72 KG/M2 | TEMPERATURE: 98.2 F | WEIGHT: 196.65 LBS | OXYGEN SATURATION: 99 % | SYSTOLIC BLOOD PRESSURE: 150 MMHG | HEART RATE: 91 BPM | DIASTOLIC BLOOD PRESSURE: 89 MMHG | RESPIRATION RATE: 16 BRPM

## 2019-03-16 PROCEDURE — 74011250637 HC RX REV CODE- 250/637: Performed by: EMERGENCY MEDICINE

## 2019-03-16 PROCEDURE — 72100 X-RAY EXAM L-S SPINE 2/3 VWS: CPT

## 2019-03-16 PROCEDURE — 74011000250 HC RX REV CODE- 250: Performed by: EMERGENCY MEDICINE

## 2019-03-16 RX ORDER — CYCLOBENZAPRINE HCL 10 MG
5 TABLET ORAL
Status: COMPLETED | OUTPATIENT
Start: 2019-03-16 | End: 2019-03-16

## 2019-03-16 RX ORDER — ACETAMINOPHEN 325 MG/1
650 TABLET ORAL
Status: COMPLETED | OUTPATIENT
Start: 2019-03-16 | End: 2019-03-16

## 2019-03-16 RX ORDER — LIDOCAINE 50 MG/G
1 PATCH TOPICAL ONCE
Status: DISCONTINUED | OUTPATIENT
Start: 2019-03-16 | End: 2019-03-16 | Stop reason: HOSPADM

## 2019-03-16 RX ORDER — ACETAMINOPHEN 325 MG/1
650 TABLET ORAL
Qty: 2 TAB | Refills: 0 | Status: SHIPPED | OUTPATIENT
Start: 2019-03-16

## 2019-03-16 RX ORDER — CYCLOBENZAPRINE HCL 10 MG
5 TABLET ORAL
Qty: 1 TAB | Refills: 0 | Status: SHIPPED | OUTPATIENT
Start: 2019-03-16 | End: 2019-03-19

## 2019-03-16 RX ORDER — LIDOCAINE 50 MG/G
PATCH TOPICAL
Qty: 1 PACKAGE | Refills: 0 | Status: SHIPPED | OUTPATIENT
Start: 2019-03-16 | End: 2019-03-19

## 2019-03-16 RX ADMIN — CYCLOBENZAPRINE HYDROCHLORIDE 5 MG: 10 TABLET, FILM COATED ORAL at 00:19

## 2019-03-16 RX ADMIN — ACETAMINOPHEN 650 MG: 325 TABLET ORAL at 00:19
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
## 2019-03-16 NOTE — DISCHARGE INSTRUCTIONS
Patient Education        Back Pain: Care Instructions  Your Care Instructions    Back pain has many possible causes. It is often related to problems with muscles and ligaments of the back. It may also be related to problems with the nerves, discs, or bones of the back. Moving, lifting, standing, sitting, or sleeping in an awkward way can strain the back. Sometimes you don't notice the injury until later. Arthritis is another common cause of back pain. Although it may hurt a lot, back pain usually improves on its own within several weeks. Most people recover in 12 weeks or less. Using good home treatment and being careful not to stress your back can help you feel better sooner. Follow-up care is a key part of your treatment and safety. Be sure to make and go to all appointments, and call your doctor if you are having problems. It's also a good idea to know your test results and keep a list of the medicines you take. How can you care for yourself at home? · Sit or lie in positions that are most comfortable and reduce your pain. Try one of these positions when you lie down:  ? Lie on your back with your knees bent and supported by large pillows. ? Lie on the floor with your legs on the seat of a sofa or chair. ? Lie on your side with your knees and hips bent and a pillow between your legs. ? Lie on your stomach if it does not make pain worse. · Do not sit up in bed, and avoid soft couches and twisted positions. Bed rest can help relieve pain at first, but it delays healing. Avoid bed rest after the first day of back pain. · Change positions every 30 minutes. If you must sit for long periods of time, take breaks from sitting. Get up and walk around, or lie in a comfortable position. · Try using a heating pad on a low or medium setting for 15 to 20 minutes every 2 or 3 hours. Try a warm shower in place of one session with the heating pad. · You can also try an ice pack for 10 to 15 minutes every 2 to 3 hours. Put a thin cloth between the ice pack and your skin. · Take pain medicines exactly as directed. ? If the doctor gave you a prescription medicine for pain, take it as prescribed. ? If you are not taking a prescription pain medicine, ask your doctor if you can take an over-the-counter medicine. · Take short walks several times a day. You can start with 5 to 10 minutes, 3 or 4 times a day, and work up to longer walks. Walk on level surfaces and avoid hills and stairs until your back is better. · Return to work and other activities as soon as you can. Continued rest without activity is usually not good for your back. · To prevent future back pain, do exercises to stretch and strengthen your back and stomach. Learn how to use good posture, safe lifting techniques, and proper body mechanics. When should you call for help? Call your doctor now or seek immediate medical care if:    · You have new or worsening numbness in your legs.     · You have new or worsening weakness in your legs. (This could make it hard to stand up.)     · You lose control of your bladder or bowels.    Watch closely for changes in your health, and be sure to contact your doctor if:    · You have a fever, lose weight, or don't feel well.     · You do not get better as expected. Where can you learn more? Go to http://jacoby-farrukh.info/. Enter F674 in the search box to learn more about \"Back Pain: Care Instructions. \"  Current as of: September 20, 2018  Content Version: 11.9  © 5109-9286 [employer]. Care instructions adapted under license by [employer] (which disclaims liability or warranty for this information). If you have questions about a medical condition or this instruction, always ask your healthcare professional. [place] [address] any warranty or liability for your use of this information.

## 2020-01-30 ENCOUNTER — HOSPITAL ENCOUNTER (OUTPATIENT)
Dept: MRI IMAGING | Age: 70
Discharge: HOME | End: 2020-01-30
Attending: FAMILY MEDICINE
Payer: MEDICARE

## 2020-01-30 DIAGNOSIS — R20.0 RIGHT ARM NUMBNESS: ICD-10-CM

## 2020-01-30 DIAGNOSIS — R20.0 RIGHT FACIAL NUMBNESS: ICD-10-CM

## 2020-01-30 PROCEDURE — A9575 INJ GADOTERATE MEGLUMI 0.1ML: HCPCS

## 2020-01-30 PROCEDURE — 74011250636 HC RX REV CODE- 250/636

## 2020-01-30 PROCEDURE — 70553 MRI BRAIN STEM W/O & W/DYE: CPT

## 2020-01-30 RX ORDER — GADOTERATE MEGLUMINE 376.9 MG/ML
17 INJECTION INTRAVENOUS
Status: COMPLETED | OUTPATIENT
Start: 2020-01-30 | End: 2020-01-30

## 2020-01-30 RX ADMIN — GADOTERATE MEGLUMINE 17 ML: 376.9 INJECTION INTRAVENOUS at 11:57

## 2020-06-15 NOTE — PROGRESS NOTES
----- Message from Saranya Damon sent at 2020 10:31 AM CDT -----  [address]  Butch Mittal  1976  HOME: [phone]   WORK: N/A   CELL: [phone]       Patient is scheduled for Labs 20  Lab orders needed: NEED NEW ORDERS    Please insure lab orders will be available by the date of service.         Patient has been accepted to [facility] when medically stable. Will follow.   [employer] , [place]

## 2020-11-10 ENCOUNTER — OFFICE VISIT (OUTPATIENT)
Dept: NEUROLOGY | Age: 70
End: 2020-11-10
Payer: MEDICARE

## 2020-11-10 DIAGNOSIS — R20.0 NUMBNESS AND TINGLING OF BOTH LEGS: Primary | ICD-10-CM

## 2020-11-10 DIAGNOSIS — F43.21 ADJUSTMENT DISORDER WITH DEPRESSED MOOD: ICD-10-CM

## 2020-11-10 DIAGNOSIS — R20.2 NUMBNESS AND TINGLING OF BOTH LEGS: Primary | ICD-10-CM

## 2020-11-10 DIAGNOSIS — I67.82 CHRONIC CEREBRAL ISCHEMIA: ICD-10-CM

## 2020-11-10 DIAGNOSIS — M51.37 DDD (DEGENERATIVE DISC DISEASE), LUMBOSACRAL: ICD-10-CM

## 2020-11-10 DIAGNOSIS — G56.01 RIGHT CARPAL TUNNEL SYNDROME: ICD-10-CM

## 2020-11-10 PROCEDURE — 3017F COLORECTAL CA SCREEN DOC REV: CPT | Performed by: PSYCHIATRY & NEUROLOGY

## 2020-11-10 PROCEDURE — G8427 DOCREV CUR MEDS BY ELIG CLIN: HCPCS | Performed by: PSYCHIATRY & NEUROLOGY

## 2020-11-10 PROCEDURE — 1100F PTFALLS ASSESS-DOCD GE2>/YR: CPT | Performed by: PSYCHIATRY & NEUROLOGY

## 2020-11-10 PROCEDURE — 1090F PRES/ABSN URINE INCON ASSESS: CPT | Performed by: PSYCHIATRY & NEUROLOGY

## 2020-11-10 PROCEDURE — G8421 BMI NOT CALCULATED: HCPCS | Performed by: PSYCHIATRY & NEUROLOGY

## 2020-11-10 PROCEDURE — 3288F FALL RISK ASSESSMENT DOCD: CPT | Performed by: PSYCHIATRY & NEUROLOGY

## 2020-11-10 PROCEDURE — G8510 SCR DEP NEG, NO PLAN REQD: HCPCS | Performed by: PSYCHIATRY & NEUROLOGY

## 2020-11-10 PROCEDURE — G8400 PT W/DXA NO RESULTS DOC: HCPCS | Performed by: PSYCHIATRY & NEUROLOGY

## 2020-11-10 PROCEDURE — 99205 OFFICE O/P NEW HI 60 MIN: CPT | Performed by: PSYCHIATRY & NEUROLOGY

## 2020-11-10 PROCEDURE — G8536 NO DOC ELDER MAL SCRN: HCPCS | Performed by: PSYCHIATRY & NEUROLOGY

## 2020-11-10 RX ORDER — SERTRALINE HYDROCHLORIDE 25 MG/1
25 TABLET, FILM COATED ORAL
Qty: 90 TAB | Refills: 1 | Status: SHIPPED | OUTPATIENT
Start: 2020-11-10 | End: 2022-03-04

## 2020-11-10 RX ORDER — HYDRALAZINE HYDROCHLORIDE 25 MG/1
25 TABLET, FILM COATED ORAL 3 TIMES DAILY
COMMUNITY
End: 2022-03-04

## 2020-11-10 RX ORDER — GUAIFENESIN 100 MG/5ML
81 LIQUID (ML) ORAL DAILY
COMMUNITY
End: 2022-03-04

## 2020-11-10 RX ORDER — DULAGLUTIDE 1.5 MG/.5ML
1.5 INJECTION, SOLUTION SUBCUTANEOUS
COMMUNITY
End: 2022-03-04

## 2020-11-10 NOTE — PROGRESS NOTES
Chief Complaint   Patient presents with    New Patient     neuropathy in both and carpel tunnel syndrome       Referred by: Dr. Cynthia Fregoso      HPI    Mrs. Mainor Katz is a 71-year-old woman with diabetes, hypertension, degenerative arthritis here because in the past 5 months she has noticed worsening bilateral lower extremity numbness tingling in both feet that began initially worse on the right foot years ago but now has become equal symmetric. She has chronic vertigo seen by ENT. She has arthritis in the left knee and left hip. She was referred by podiatry for the worsening neuropathy. Symptoms are such that when she wakes up in the morning she is very imbalanced. She needs to hold onto things. She needs to use a cane. She has pain in her joints. It is very difficult for her to walk long distances now. She has a history of a laminectomy in the low back a few years ago but she does not think it helped. She has carpal tunnel on the right being seen by Ortho. She lives alone currently and is going through significant personal stressors. She is very depressed. Review of Systems   Constitutional: Positive for malaise/fatigue. Musculoskeletal: Positive for back pain and joint pain. Neurological: Positive for tingling, sensory change and weakness. Psychiatric/Behavioral: Positive for depression. All other systems reviewed and are negative.       Past Medical History:   Diagnosis Date    Arthritis     Chronic pain     Diabetes ([address]     GERD (gastroesophageal reflux disease)     Hypertension     Ill-defined condition     ucerative colitis    Ulcerative colitis ([address]     Vertigo      Family History   Problem Relation Age of Onset    Heart Disease Mother     Diabetes Mother     Diabetes Father     Hearing Impairment Brother      Social History     Socioeconomic History    Marital status: SINGLE     Spouse name: Not on file    Number of children: Not on file    Years of education: Not on file    Highest education level: Not on file   Occupational History    Not on file   Social Needs    Financial resource strain: Not on file    Food insecurity     Worry: Not on file     Inability: Not on file    Transportation needs     Medical: Not on file     Non-medical: Not on file   Tobacco Use    Smoking status: Never Smoker    Smokeless tobacco: Never Used   Substance and Sexual Activity    Alcohol use: No    Drug use: No    Sexual activity: Not on file   Lifestyle    Physical activity     Days per week: Not on file     Minutes per session: Not on file    Stress: Not on file   Relationships    Social connections     Talks on phone: Not on file     Gets together: Not on file     Attends Taoism service: Not on file     Active member of club or organization: Not on file     Attends meetings of clubs or organizations: Not on file     Relationship status: Not on file    Intimate partner violence     Fear of current or ex partner: Not on file     Emotionally abused: Not on file     Physically abused: Not on file     Forced sexual activity: Not on file   Other Topics Concern    Not on file   Social History Narrative    Not on file     Current Outpatient Medications   Medication Sig    dulaglutide (Trulicity) 1.5 QB/3.7 mL sub-q pen 1.5 mg by SubCUTAneous route every seven (7) days.  hydrALAZINE (APRESOLINE) 25 mg tablet Take 25 mg by mouth three (3) times daily.  aspirin 81 mg chewable tablet Take 81 mg by mouth daily.  sertraline (ZOLOFT) 25 mg tablet Take 1 Tab by mouth nightly.  glipiZIDE (GLUCOTROL) 10 mg tablet Take 20 mg by mouth daily.  cloNIDine HCl (CATAPRES) 0.1 mg tablet Take 0.1 mg by mouth two (2) times a day.  losartan-hydroCHLOROthiazide (HYZAAR) 100-25 mg per tablet Take 1 Tab by mouth daily.  metFORMIN (GLUCOPHAGE) 1,000 mg tablet Take 1,000 mg by mouth two (2) times daily (with meals).       acetaminophen (TYLENOL) 325 mg tablet Take 2 Tabs by mouth every six (6) hours as needed for Pain.  oxyCODONE-acetaminophen (PERCOCET 10)  mg per tablet Take 1 Tab by mouth every four (4) hours as needed. Max Daily Amount: 6 Tabs.  polyethylene glycol (MIRALAX) 17 gram packet Take 1 Packet by mouth daily.  senna-docusate (PERICOLACE) 8.6-50 mg per tablet Take 1 Tab by mouth daily.  metoprolol succinate (TOPROL-XL) 50 mg XL tablet Take 50 mg by mouth daily.  raNITIdine (ZANTAC) 150 mg tablet Take 150 mg by mouth daily. No current facility-administered medications for this visit. No Known Allergies      Neurologic Exam     Mental Status   Oriented to person, place, and time. Cranial Nerves   Cranial nerves II through XII intact. Motor Exam   Muscle bulk: normal    Strength   Strength 5/5 throughout. No atrophy     Sensory Exam   Light touch normal.   Reduced sensation distal legs and feet     Gait, Coordination, and Reflexes     Gait  Gait: (Little wide and cautious with a single-point cane but steady)Traceabsent reflexes throughout     Physical Exam   Constitutional: She is oriented to person, place, and time. She appears well-developed and well-nourished. Cardiovascular: Normal rate. Pulmonary/Chest: Effort normal.   Neurological: She is oriented to person, place, and time. She has normal strength. Skin: Skin is warm and dry. Psychiatric: She has a normal mood and affect. Her behavior is normal.   Good eye contact appropriate and engaging but clearly depressed with insight into her depression   Vitals reviewed. There were no vitals taken for this visit.     Lab Results   Component Value Date/Time    WBC 11.9 (H) 06/21/2017 11:22 AM    HGB 12.4 06/21/2017 11:22 AM    HCT 38.0 06/21/2017 11:22 AM    PLATELET 003 78/03/9347 11:22 AM    MCV 91.8 06/21/2017 11:22 AM     Lab Results   Component Value Date/Time    Hemoglobin A1c 8.5 (H) 06/20/2017 04:02 AM    Hemoglobin A1c 8.2 (H) 06/07/2017 10:10 AM    Glucose 191 (H) 06/07/2017 10:10 AM    Glucose (POC) 246 (H) 06/22/2017 11:10 AM    Creatinine 1.01 06/07/2017 10:10 AM      No results found for: CHOL, CHOLPOCT, HDL, LDL, LDLC, LDLCPOC, LDLCEXT, TRIGL, TGLPOCT, CHHD, CHHDX  Lab Results   Component Value Date/Time    INR 1.0 06/07/2017 10:10 AM    Prothrombin time 10.4 06/07/2017 10:10 AM    PLATELET 293 25/70/1418 11:22 AM        CT Results (maximum last 3): Results from [place] encounter on 01/27/15   CT HEAD WO CONT    Narrative **Final Report**       ICD Codes / Adm. Diagnosis: 435.9   / Unspecified transient cerebral    Examination:  CT HEAD WO CON  - [id number] - Jan 27 2015  4:28PM  Accession No:  [id number]  Reason:  TIA (transient ischemic attack)      REPORT:  EXAM:  CT HEAD WO CON    INDICATION:   TIA (transient ischemic attack)    COMPARISON: None. TECHNIQUE: Unenhanced CT of the head was performed using 5 mm images. Brain   and bone windows were generated. Sagittal and coronal reformatted images   were also generated. FINDINGS:  The ventricles and sulci are normal in size, shape and configuration and   midline. There is a small wedge-shaped low-attenuation area in the left   inferior occipital lobe, likely representing a small chronic infarct. . There   is no intracranial hemorrhage, extra-axial collection, mass, mass effect or   midline shift. The basilar cisterns are open. Areas of hypodensity in the   cerebral white matter are likely due to intracranial small vessel disease. .   The bone windows demonstrate no abnormalities. The visualized paranasal   sinuses and mastoid air cells are clear. There is vertebral basilar   dolichoectasia. Mild calcification of the cavernous carotid arteries is   present. .        IMPRESSION:   1. No acute findings. 2. 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 Areas of white matter   hypodensity likely due to small vessel disease. 3. Vertebral basilar dolichoectasia. Mild vascular calcification.               Signing/Reading Doctor: Yolis Gerard ([id number])    Approved: Yolis Gerard ([id number])  Jan 27 2015  4:58PM                                   MRI Results (maximum last 3): Results from [place] encounter on 01/30/20   MRI BRAIN W WO CONT    Narrative EXAM:  MRI BRAIN W WO CONT    INDICATION:    Right facial numbness. Right arm numbness. COMPARISON:  None. CONTRAST: 17 ml Dotarem. TECHNIQUE:    Multiplanar multisequence acquisition without and with contrast of the brain. FINDINGS:  The ventricles are normal in size and position. Periventricular deep white  matter T2/FLAIR hyperintensities, confluent in regions, and patchy T2/FLAIR  hyperintensity in the quinn, consistent with moderate chronic microvascular  ischemic disease. There are small chronic infarcts noted in the bilateral  thalami, left occipital lobe and right cerebellum. There are numerous chronic  microhemorrhages noted in the bilateral thalami, as well as additional small  scattered chronic microhemorrhages in the left parietal lobe, bilateral temporal  lobes, and in the midline cerebellum. There is no acute infarct, hemorrhage,  extra-axial fluid collection, or mass effect. There is no cerebellar tonsillar  herniation. Expected arterial flow-voids are present. No evidence of abnormal  enhancement. The paranasal sinuses, mastoid air cells, and middle ears are clear. The orbital  contents are within normal limits. No significant osseous or scalp lesions are  identified. Impression IMPRESSION:   1. No acute intracranial abnormality. 2. Moderate chronic microvascular ischemic disease. Small chronic infarcts in  the bilateral thalami, left occipital lobe and right cerebellum. Multiple  chronic supratentorial and infratentorial microhemorrhages, predominantly within  the thalami, secondary to chronic microvascular disease.      Results from [place] encounter on 03/06/17   MRI LUMB SPINE WO CONT    Narrative EXAM:  MRI LUMB SPINE WO CONT    INDICATION:  Spinal stenosis, lumbar region, without neurogenic claudication. Chronic right buttock pain    COMPARISON: 12/9/2015    TECHNIQUE: MR imaging of the lumbar spine was performed using the following  sequences: sagittal T1, T2, FIR;  axial T1, T2. The scan was performed on the  open 0.7 Yolanda magnet. CONTRAST:  None. FINDINGS:    There is unchanged minimal retrolisthesis of L3 on L4. There is unchanged grade  1 anterolisthesis of L4 on L5. There is unchanged grade 1 retrolisthesis of L5  on S1. Vertebral body heights are maintained. A Schmorl's node seen in the  inferior endplate of L4. Vertebral body heights are maintained. No evidence of  fracture. The conus medullaris terminates at T12-L1. Signal and caliber of the distal  spinal cord are within normal limits. The uterus appears enlarged. T12-L1: Minimal central disc bulge without significant spinal stenosis or neural  foraminal narrowing. . This is unchanged. L1-L2:  No herniation or stenosis. L2-L3:  No herniation or stenosis. L3-L4:  Severe disc space narrowing. Minimal broad-based disc bulge causing  minimal spinal stenosis and mild bilateral neural foraminal narrowing. This is  unchanged. L4-L5:  Moderate disc space narrowing. The anterolisthesis causes unroofing of  the posterior aspect of the disc with a mild broad-based disc bulge and  ligamentum flavum thickening causing moderate to severe spinal stenosis. There  is moderate bilateral neural foraminal narrowing. Severe posterior facet  arthropathy is present with increased fluid in the joint spaces. This is  unchanged. L5-S1:  Severe disc space narrowing. Mild broad-based disc bulge without  significant spinal stenosis. There is moderate bilateral neural foraminal  narrowing. This is unchanged. Impression IMPRESSION:    1. Unchanged grade 1 anterolisthesis of L4 on L5 with severe posterior facet  arthropathy and increased fluid in the joint spaces. This can be a sign of  instability. Flexion and extension radiographs could be used for further  evaluation. 2. Unchanged minimal retrolisthesis of L3 on L4 and L5 on S1.  3. Unchanged multilevel spondylosis as above, worst at L4-5.  4. Uterine enlargement. Results from [place] encounter on 12/09/15   MRI LUMB SPINE WO CONT    Narrative **Final Report**       ICD Codes / Adm. Diagnosis: 724.2  M54.5 / Lumbago  Low back pain  Examination:  MRI L SPINE WO CON  - [id number] - Dec  9 2015 12:03PM  Accession No:  [id number]  Reason:  Low back pain      REPORT:  EXAM:  MRI L SPINE WO CON    INDICATION:  Low back pain. Right leg numbness and paresthesias. Prior to   ratio of approximately 1 year. No antecedent injury. M 54.5. COMPARISON: None    TECHNIQUE: MR imaging of the lumbar spine was performed using the following   sequences: sagittal T1, T2, STIR;  axial T1, T2. The study was performed on   an open configuration low field MR imaging system. CONTRAST:  None. FINDINGS:    Conus position, morphology and signal and normal. There is normal vertebral   body height. Mild discogenic endplate signal changes are shown anteriorly at   L3-4 and L5-S1, with otherwise normal bone signal. There is grade 1   anterolisthesis at L4-5, measuring 4 mm. There is retrolisthesis at L5-S1   measuring 6 mm. There is borderline retrolisthesis at L3-4. No paraspinal   soft tissue mass is shown the uterus is partly visualized in the   field-of-view and appears enlarged. Clinical correlation is recommended. T9-10 and T10-11: No disc herniation, facet arthropathy, canal stenosis or   foraminal stenosis. T11-12: Normal disc height. Minimal central disc bulging. No facet   arthropathy, canal stenosis or foraminal stenosis. T12-L1: Mild disc space narrowing. Minimal central disc bulging. No facet   arthropathy, canal stenosis or foraminal stenosis. L1-L2:  Normal disc and facets. L2-L3:  Normal disc height. Minimal bilateral foraminal disc bulging and   bilateral facet osteoarthrosis. No canal or foraminal stenosis. L3-L4:  Moderate disc space narrowing. Mild diffuse disc bulging. No facet   arthropathy. Borderline canal stenosis. Mild bilateral foraminal stenosis. L4-L5:  Mild to moderate disc space narrowing. Moderate diffuse disc bulging   with superimposed left paracentral disc herniation. Moderate to severe   bilateral facet osteoarthrosis. Severe canal stenosis. Moderate bilateral   foraminal stenosis. L5-S1:  Moderate to severe disc space narrowing. Mild to moderate diffuse   disc bulging accentuated centrally and in the foraminal regions bilaterally. Mild to moderate bilateral facet osteoarthrosis. Mild canal stenosis with   thecal sac narrowing augmented by the presence of epidural fat. Moderate to   severe bilateral foraminal stenosis. IMPRESSION:    1. Partial uterine visualization suggests enlargement. Clinical correlation   recommended. 2. Multilevel degenerative changes. Severe canal stenosis at L4-5. Foraminal   stenosis as above. Signing/Reading Doctor: Jimy Guerrero. Farida Munoz ([id number])    Approved: CHRIS Munoz ([id number])  Dec  9 2015 12:58PM                                    PET Results (maximum last 3): No results found for this or any previous visit. Assessment and Plan   Diagnoses and all orders for this visit:    1. Numbness and tingling of both legs  -     EMG NCV MOTOR WITH F/WAVE PER NERVE; Future  -     MRI LUMB SPINE WO CONT; Future    2. DDD (degenerative disc disease), lumbosacral  -     EMG NCV MOTOR WITH F/WAVE PER NERVE; Future  -     MRI LUMB SPINE WO CONT; Future    3. Right carpal tunnel syndrome  -     EMG NCV MOTOR WITH F/WAVE PER NERVE; Future    4. Adjustment disorder with depressed mood    5. Chronic cerebral ischemia    Other orders  -     sertraline (ZOLOFT) 25 mg tablet; Take 1 Tab by mouth nightly.       73-year-old woman who has very likely peripheral neuropathy in both lower extremities probably due to diabetes but also with contribution from lumbosacral disease manifesting as a polyradiculoneuropathy. Exam is consistent fortunately she has good strength and no atrophy. She needs to keep walking daily as best she can. I suspect the imbalance is multifactorial due to pre-existing vertigo and now sensory ataxia due to the neuropathy in the feet. She needs to keep using her cane. We will check an EMG looking for the degree of neuropathy versus radiculopathy. Also will evaluate the right hand for carpal tunnel. We spent the bulk of this visit talking about her emotional state. She is clearly very depressed and has had suicidal ideation in the past but denies any today and contracts for safety. She is going to start talking to a psychologist tomorrow. I offered her to start taking sertraline today and she will think about it. Additionally she had an MRI of the brain done earlier this year for facial symptoms thought to be a TIA. She was unaware of the results that it did show bilateral chronic ischemic changes. She needs to be on aspirin for stroke prevention and we talked about that. Stroke education was done. Anything unusual please come to the hospital immediately. I discussed with her that I do not think when I have a remedy for her neuropathy and it may get worse but we should investigate further to make sure were not missing anything easily repairable or addressable. She understands this. She also understands that if she feels suicidal she will reach out for help. We will see her in follow-up. I reviewed and decided to continue the current medications. This clinical note was dictated with an electronic dictation software that can make unintentional errors. If there are any questions, please contact me directly for clarification.       A notice of this visit/encounter being completed has been sent electronically to the patient's PCP and/or referring provider.      [address] [place] [address], [facility] [address]  Diplomate ABPN

## 2020-11-10 NOTE — LETTER
11/10/20 Patient: Malvina Kocher YOB: 1950 Date of Visit: 11/10/2020 Ricco Parker MD 
[address] [id number] VIA Facsimile: [phone] Lois Mcgregor, [facility] [address] VIA Facsimile: [phone] Dear MD Lois Dee DPM, Thank you for referring Ms. Malvina Kocher to [address] [place] [address] for evaluation. My notes for this consultation are attached. If you have questions, please do not hesitate to call me. I look forward to following your patient along with you. Sincerely, [address] [facility], DO 
 
11/10/2020 Patient:  Malvina Kocher YOB: 1950 Date of Visit: 11/10/2020 Dear Ricco Parker MD 
[address] [place] [address] VIA Facsimile: [phone] Lois Mcgregor, [address] [place] [address] VIA Facsimile: [phone]: I was requested by Laura Kitchen MD to evaluate Ms. Malvina Kocher  for Chief Complaint Patient presents with  New Patient  
  neuropathy in both and carpel tunnel syndrome Sara Eros I am recommending the following:  
 
Diagnoses and all orders for this visit: 1. Numbness and tingling of both legs 
-     EMG NCV MOTOR WITH F/WAVE PER NERVE; Future -     MRI LUMB SPINE WO CONT; Future 2. DDD (degenerative disc disease), lumbosacral 
-     EMG NCV MOTOR WITH F/WAVE PER NERVE; Future -     MRI LUMB SPINE WO CONT; Future 3. Right carpal tunnel syndrome 
-     EMG NCV MOTOR WITH F/WAVE PER NERVE; Future 4. Adjustment disorder with depressed mood 5. Chronic cerebral ischemia Other orders 
-     sertraline (ZOLOFT) 25 mg tablet; Take 1 Tab by mouth nightly. 
 
 
 
---------------------------------------------------------------------------------------------------------------------- Below is my encounter: Chief Complaint Patient presents with  New Patient  
  neuropathy in both and carpel tunnel syndrome Referred by: Dr. Naomi Smith HPI Mrs. Promise Addison is a 67-year-old woman with diabetes, hypertension, degenerative arthritis here because in the past 5 months she has noticed worsening bilateral lower extremity numbness tingling in both feet that began initially worse on the right foot years ago but now has become equal symmetric. She has chronic vertigo seen by ENT. She has arthritis in the left knee and left hip. She was referred by podiatry for the worsening neuropathy. Symptoms are such that when she wakes up in the morning she is very imbalanced. She needs to hold onto things. She needs to use a cane. She has pain in her joints. It is very difficult for her to walk long distances now. She has a history of a laminectomy in the low back a few years ago but she does not think it helped. She has carpal tunnel on the right being seen by Ortho. She lives alone currently and is going through significant personal stressors. She is very depressed. Review of Systems Constitutional: Positive for malaise/fatigue. Musculoskeletal: Positive for back pain and joint pain. Neurological: Positive for tingling, sensory change and weakness. Psychiatric/Behavioral: Positive for depression. All other systems reviewed and are negative. Past Medical History:  
Diagnosis Date  Arthritis  Chronic pain  Diabetes ([address]  GERD (gastroesophageal reflux disease)  Hypertension  Ill-defined condition   
 ucerative colitis  Ulcerative colitis ([address]  Vertigo Family History Problem Relation Age of Onset  Heart Disease Mother  Diabetes Mother  Diabetes Father  Hearing Impairment Brother Social History Socioeconomic History  Marital status: SINGLE Spouse name: Not on file  Number of children: Not on file  Years of education: Not on file  Highest education level: Not on file Occupational History  Not on file Social Needs  Financial resource strain: Not on file  Food insecurity Worry: Not on file Inability: Not on file  Transportation needs Medical: Not on file Non-medical: Not on file Tobacco Use  Smoking status: Never Smoker  Smokeless tobacco: Never Used Substance and Sexual Activity  Alcohol use: No  
 Drug use: No  
 Sexual activity: Not on file Lifestyle  Physical activity Days per week: Not on file Minutes per session: Not on file  Stress: Not on file Relationships  Social connections Talks on phone: Not on file Gets together: Not on file Attends Rastafari service: Not on file Active member of club or organization: Not on file Attends meetings of clubs or organizations: Not on file Relationship status: Not on file  Intimate partner violence Fear of current or ex partner: Not on file Emotionally abused: Not on file Physically abused: Not on file Forced sexual activity: Not on file Other Topics Concern  Not on file Social History Narrative  Not on file Current Outpatient Medications Medication Sig  dulaglutide (Trulicity) 1.5 PN/1.2 mL sub-q pen 1.5 mg by SubCUTAneous route every seven (7) days.  hydrALAZINE (APRESOLINE) 25 mg tablet Take 25 mg by mouth three (3) times daily.  aspirin 81 mg chewable tablet Take 81 mg by mouth daily.  sertraline (ZOLOFT) 25 mg tablet Take 1 Tab by mouth nightly.  glipiZIDE (GLUCOTROL) 10 mg tablet Take 20 mg by mouth daily.  cloNIDine HCl (CATAPRES) 0.1 mg tablet Take 0.1 mg by mouth two (2) times a day.  losartan-hydroCHLOROthiazide (HYZAAR) 100-25 mg per tablet Take 1 Tab by mouth daily.  metFORMIN (GLUCOPHAGE) 1,000 mg tablet Take 1,000 mg by mouth two (2) times daily (with meals).     
 acetaminophen (TYLENOL) 325 mg tablet Take 2 Tabs by mouth every six (6) hours as needed for Pain.  oxyCODONE-acetaminophen (PERCOCET 10)  mg per tablet Take 1 Tab by mouth every four (4) hours as needed. Max Daily Amount: 6 Tabs.  polyethylene glycol (MIRALAX) 17 gram packet Take 1 Packet by mouth daily.  senna-docusate (PERICOLACE) 8.6-50 mg per tablet Take 1 Tab by mouth daily.  metoprolol succinate (TOPROL-XL) 50 mg XL tablet Take 50 mg by mouth daily.  raNITIdine (ZANTAC) 150 mg tablet Take 150 mg by mouth daily. No current facility-administered medications for this visit. No Known Allergies Neurologic Exam  
 
Mental Status Oriented to person, place, and time. Cranial Nerves Cranial nerves II through XII intact. Motor Exam  
Muscle bulk: normal 
 
Strength Strength 5/5 throughout. No atrophy Sensory Exam  
Light touch normal.  
Reduced sensation distal legs and feet Gait, Coordination, and Reflexes Gait Gait: (Little wide and cautious with a single-point cane but steady)Traceabsent reflexes throughout Physical Exam  
Constitutional: She is oriented to person, place, and time. She appears well-developed and well-nourished. Cardiovascular: Normal rate. Pulmonary/Chest: Effort normal.  
Neurological: She is oriented to person, place, and time. She has normal strength. Skin: Skin is warm and dry. Psychiatric: She has a normal mood and affect. Her behavior is normal.  
Good eye contact appropriate and engaging but clearly depressed with insight into her depression Vitals reviewed. There were no vitals taken for this visit. Lab Results Component Value Date/Time WBC 11.9 (H) 06/21/2017 11:22 AM  
 HGB 12.4 06/21/2017 11:22 AM  
 HCT 38.0 06/21/2017 11:22 AM  
 PLATELET 668 05/28/1341 11:22 AM  
 MCV 91.8 06/21/2017 11:22 AM  
 
Lab Results Component Value Date/Time  Hemoglobin A1c 8.5 (H) 06/20/2017 04:02 AM  
 Hemoglobin A1c 8.2 (H) 06/07/2017 10:10 AM  
 Glucose 191 (H) 06/07/2017 10:10 AM  
 Glucose (POC) 246 (H) 06/22/2017 11:10 AM  
 Creatinine 1.01 06/07/2017 10:10 AM  
  
No results found for: CHOL, CHOLPOCT, HDL, LDL, LDLC, LDLCPOC, LDLCEXT, TRIGL, TGLPOCT, CHHD, CHHDX Lab Results Component Value Date/Time INR 1.0 06/07/2017 10:10 AM  
 Prothrombin time 10.4 06/07/2017 10:10 AM  
 PLATELET 823 57/43/2421 11:22 AM  
  
 
CT Results (maximum last 3): Results from Hospital Encounter encounter on 01/27/15 CT HEAD WO CONT Narrative **Final Report** 
  
 
ICD Codes / Adm. Diagnosis: 435.9   / Unspecified transient cerebral   
Examination:  CT HEAD WO CON  - [id number] - Jan 27 2015  4:28PM 
Accession No:  [id number] Reason:  TIA (transient ischemic attack) REPORT: 
EXAM:  CT HEAD WO CON 
 
INDICATION:   TIA (transient ischemic attack) COMPARISON: None. TECHNIQUE: Unenhanced CT of the head was performed using 5 mm images. Brain  
and bone windows were generated. Sagittal and coronal reformatted images  
were also generated. FINDINGS: 
The ventricles and sulci are normal in size, shape and configuration and  
midline. There is a small wedge-shaped low-attenuation area in the left  
inferior occipital lobe, likely representing a small chronic infarct. . There  
is no intracranial hemorrhage, extra-axial collection, mass, mass effect or  
midline shift. The basilar cisterns are open. Areas of hypodensity in the  
cerebral white matter are likely due to intracranial small vessel disease. Akhil Pee The bone windows demonstrate no abnormalities. The visualized paranasal  
sinuses and mastoid air cells are clear. There is vertebral basilar  
dolichoectasia. Mild calcification of the cavernous carotid arteries is  
present. [place] Pee IMPRESSION:  
1. No acute findings. 2. 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 Areas of white matter  
hypodensity likely due to small vessel disease. 3. Vertebral basilar dolichoectasia. Mild vascular calcification. Signing/Reading Doctor: Vidal Roland ([id number]) Madinajoan Narvaez ([id number])  Jan 27 2015  4:58PM                      
 
 
   
 
 
MRI Results (maximum last 3): Results from [facility] Encounter encounter on 01/30/20 MRI BRAIN W WO CONT Narrative EXAM:  MRI BRAIN W WO CONT INDICATION:    Right facial numbness. Right arm numbness. COMPARISON:  None. CONTRAST: 17 ml Dotarem. TECHNIQUE:   
Multiplanar multisequence acquisition without and with contrast of the brain. FINDINGS: 
The ventricles are normal in size and position. Periventricular deep white 
matter T2/FLAIR hyperintensities, confluent in regions, and patchy T2/FLAIR 
hyperintensity in the quinn, consistent with moderate chronic microvascular 
ischemic disease. There are small chronic infarcts noted in the bilateral 
thalami, left occipital lobe and right cerebellum. There are numerous chronic 
microhemorrhages noted in the bilateral thalami, as well as additional small 
scattered chronic microhemorrhages in the left parietal lobe, bilateral temporal 
lobes, and in the midline cerebellum. There is no acute infarct, hemorrhage, 
extra-axial fluid collection, or mass effect. There is no cerebellar tonsillar 
herniation. Expected arterial flow-voids are present. No evidence of abnormal 
enhancement. The paranasal sinuses, mastoid air cells, and middle ears are clear. The orbital 
contents are within normal limits. No significant osseous or scalp lesions are 
identified. Impression IMPRESSION:  
1. No acute intracranial abnormality. 2. Moderate chronic microvascular ischemic disease. Small chronic infarcts in 
the bilateral thalami, left occipital lobe and right cerebellum. Multiple 
chronic supratentorial and infratentorial microhemorrhages, predominantly within 
the thalami, secondary to chronic microvascular disease. Results from Hospital Encounter encounter on 03/06/17 MRI LUMB SPINE WO CONT Narrative EXAM:  MRI LUMB SPINE WO CONT INDICATION:  Spinal stenosis, lumbar region, without neurogenic claudication. Chronic right buttock pain COMPARISON: 12/9/2015 TECHNIQUE: MR imaging of the lumbar spine was performed using the following 
sequences: sagittal T1, T2, FIR;  axial T1, T2. The scan was performed on the 
open 0.7 Yolanda magnet. CONTRAST:  None. FINDINGS: 
 
There is unchanged minimal retrolisthesis of L3 on L4. There is unchanged grade 1 anterolisthesis of L4 on L5. There is unchanged grade 1 retrolisthesis of L5 
on S1. Vertebral body heights are maintained. A Schmorl's node seen in the 
inferior endplate of L4. Vertebral body heights are maintained. No evidence of 
fracture. The conus medullaris terminates at T12-L1. Signal and caliber of the distal 
spinal cord are within normal limits. The uterus appears enlarged. T12-L1: Minimal central disc bulge without significant spinal stenosis or neural 
foraminal narrowing. . This is unchanged. L1-L2:  No herniation or stenosis. L2-L3:  No herniation or stenosis. L3-L4:  Severe disc space narrowing. Minimal broad-based disc bulge causing 
minimal spinal stenosis and mild bilateral neural foraminal narrowing. This is 
unchanged. L4-L5:  Moderate disc space narrowing. The anterolisthesis causes unroofing of 
the posterior aspect of the disc with a mild broad-based disc bulge and 
ligamentum flavum thickening causing moderate to severe spinal stenosis. There 
is moderate bilateral neural foraminal narrowing. Severe posterior facet 
arthropathy is present with increased fluid in the joint spaces. This is 
unchanged. L5-S1:  Severe disc space narrowing. Mild broad-based disc bulge without 
significant spinal stenosis. There is moderate bilateral neural foraminal 
narrowing. This is unchanged. Impression IMPRESSION: 
 
1. Unchanged grade 1 anterolisthesis of L4 on L5 with severe posterior facet arthropathy and increased fluid in the joint spaces. This can be a sign of 
instability. Flexion and extension radiographs could be used for further 
evaluation. 2. Unchanged minimal retrolisthesis of L3 on L4 and L5 on S1. 
3. Unchanged multilevel spondylosis as above, worst at L4-5. 
4. Uterine enlargement. Results from Hospital Encounter encounter on 12/09/15 MRI LUMB SPINE WO CONT Narrative **Final Report** 
  
 
ICD Codes / Adm. Diagnosis: 724.2  M54.5 / Lumbago  Low back pain Examination:  MRI L SPINE WO CON  - [id number] - Dec  9 2015 12:03PM 
Accession No:  [id number] Reason:  Low back pain REPORT: 
EXAM:  MRI L SPINE WO CON 
 
INDICATION:  Low back pain. Right leg numbness and paresthesias. Prior to  
ratio of approximately 1 year. No antecedent injury. M 54.5. COMPARISON: None TECHNIQUE: MR imaging of the lumbar spine was performed using the following  
sequences: sagittal T1, T2, STIR;  axial T1, T2. The study was performed on  
an open configuration low field MR imaging system. CONTRAST:  None. FINDINGS: 
 
Conus position, morphology and signal and normal. There is normal vertebral  
body height. Mild discogenic endplate signal changes are shown anteriorly at L3-4 and L5-S1, with otherwise normal bone signal. There is grade 1  
anterolisthesis at L4-5, measuring 4 mm. There is retrolisthesis at L5-S1  
measuring 6 mm. There is borderline retrolisthesis at L3-4. No paraspinal  
soft tissue mass is shown the uterus is partly visualized in the  
field-of-view and appears enlarged. Clinical correlation is recommended. T9-10 and T10-11: No disc herniation, facet arthropathy, canal stenosis or  
foraminal stenosis. T11-12: Normal disc height. Minimal central disc bulging. No facet  
arthropathy, canal stenosis or foraminal stenosis. T12-L1: Mild disc space narrowing. Minimal central disc bulging. No facet  
arthropathy, canal stenosis or foraminal stenosis. L1-L2:  Normal disc and facets. L2-L3:  Normal disc height. Minimal bilateral foraminal disc bulging and  
bilateral facet osteoarthrosis. No canal or foraminal stenosis. L3-L4:  Moderate disc space narrowing. Mild diffuse disc bulging. No facet  
arthropathy. Borderline canal stenosis. Mild bilateral foraminal stenosis. L4-L5:  Mild to moderate disc space narrowing. Moderate diffuse disc bulging  
with superimposed left paracentral disc herniation. Moderate to severe  
bilateral facet osteoarthrosis. Severe canal stenosis. Moderate bilateral  
foraminal stenosis. L5-S1:  Moderate to severe disc space narrowing. Mild to moderate diffuse  
disc bulging accentuated centrally and in the foraminal regions bilaterally. Mild to moderate bilateral facet osteoarthrosis. Mild canal stenosis with  
thecal sac narrowing augmented by the presence of epidural fat. Moderate to  
severe bilateral foraminal stenosis. IMPRESSION: 
 
1. Partial uterine visualization suggests enlargement. Clinical correlation  
recommended. 2. Multilevel degenerative changes. Severe canal stenosis at L4-5. Foraminal  
stenosis as above. Signing/Reading Doctor: Faviola Archer. Alban Taylor ([id number]) Approved: CHRIS Taylor ([id number])  Dec  9 2015 12:58PM                       
 
 
   
 
 
PET Results (maximum last 3): No results found for this or any previous visit. Assessment and Plan Diagnoses and all orders for this visit: 1. Numbness and tingling of both legs 
-     EMG NCV MOTOR WITH F/WAVE PER NERVE; Future -     MRI LUMB SPINE WO CONT; Future 2. DDD (degenerative disc disease), lumbosacral 
-     EMG NCV MOTOR WITH F/WAVE PER NERVE; Future -     MRI LUMB SPINE WO CONT; Future 3. Right carpal tunnel syndrome 
-     EMG NCV MOTOR WITH F/WAVE PER NERVE; Future 4. Adjustment disorder with depressed mood 5. Chronic cerebral ischemia Other orders -     sertraline (ZOLOFT) 25 mg tablet; Take 1 Tab by mouth nightly. 71-year-old woman who has very likely peripheral neuropathy in both lower extremities probably due to diabetes but also with contribution from lumbosacral disease manifesting as a polyradiculoneuropathy. Exam is consistent fortunately she has good strength and no atrophy. She needs to keep walking daily as best she can. I suspect the imbalance is multifactorial due to pre-existing vertigo and now sensory ataxia due to the neuropathy in the feet. She needs to keep using her cane. We will check an EMG looking for the degree of neuropathy versus radiculopathy. Also will evaluate the right hand for carpal tunnel. We spent the bulk of this visit talking about her emotional state. She is clearly very depressed and has had suicidal ideation in the past but denies any today and contracts for safety. She is going to start talking to a psychologist tomorrow. I offered her to start taking sertraline today and she will think about it. Additionally she had an MRI of the brain done earlier this year for facial symptoms thought to be a TIA. She was unaware of the results that it did show bilateral chronic ischemic changes. She needs to be on aspirin for stroke prevention and we talked about that. Stroke education was done. Anything unusual please come to the hospital immediately. I discussed with her that I do not think when I have a remedy for her neuropathy and it may get worse but we should investigate further to make sure were not missing anything easily repairable or addressable. She understands this. She also understands that if she feels suicidal she will reach out for help. We will see her in follow-up. I reviewed and decided to continue the current medications. This clinical note was dictated with an electronic dictation software that can make unintentional errors.   If there are any questions, please contact me directly for clarification. A notice of this visit/encounter being completed has been sent electronically to the patient's PCP and/or referring provider. [address], DO 
NEUROLOGIST Diplomate ABPN Thank you for giving me the opportunity to assist in the care of Ms. Kathy Rodriguez. If you have questions, please do not hesitate to contact me. Sincerely, [address], DO Neurologist 
Brain Injury Medicine Diplomate ABPN

## 2020-11-11 ENCOUNTER — TELEPHONE (OUTPATIENT)
Dept: NEUROLOGY | Age: 70
End: 2020-11-11

## 2020-11-18 ENCOUNTER — OFFICE VISIT (OUTPATIENT)
Dept: NEUROLOGY | Age: 70
End: 2020-11-18
Payer: MEDICARE

## 2020-11-18 VITALS
BODY MASS INDEX: 32.65 KG/M2 | OXYGEN SATURATION: 98 % | WEIGHT: 196 LBS | HEIGHT: 65 IN | RESPIRATION RATE: 16 BRPM | SYSTOLIC BLOOD PRESSURE: 140 MMHG | DIASTOLIC BLOOD PRESSURE: 90 MMHG | HEART RATE: 80 BPM

## 2020-11-18 DIAGNOSIS — G56.01 CARPAL TUNNEL SYNDROME OF RIGHT WRIST: Primary | ICD-10-CM

## 2020-11-18 DIAGNOSIS — G56.21 ULNAR NEUROPATHY AT ELBOW OF RIGHT UPPER EXTREMITY: ICD-10-CM

## 2020-11-18 DIAGNOSIS — R20.9 SKIN SENSATION DISTURBANCE: ICD-10-CM

## 2020-11-18 PROCEDURE — 95886 MUSC TEST DONE W/N TEST COMP: CPT | Performed by: PSYCHIATRY & NEUROLOGY

## 2020-11-18 PROCEDURE — 95912 NRV CNDJ TEST 11-12 STUDIES: CPT | Performed by: PSYCHIATRY & NEUROLOGY

## 2020-11-18 NOTE — PROGRESS NOTES
[address] [facility]  [address]  Phone [phone] Fax [phone]  Test Date:  2020    Patient: Shirley Dimas : 1950 Physician: Isis Mtz. Aissatou Begum DO   Sex: Female Height: 5' 5\" Ref Phys: Cecilia Mazariegos DO   ID#: [id number] Weight: 196 lbs. Technician: Jose Pierre     Patient Complaints:  Paresthesias of the bilateral lower extremities and right hand    NCV & EMG Findings:  Evaluation of the right median motor, the right peroneal motor, and the left sural sensory nerves showed reduced amplitude (R4.4, R1.3, L0.2 µV). The right ulnar motor nerve showed decreased conduction velocity (A Elbow-B Elbow, 43 m/s). The right median sensory nerve showed prolonged distal peak latency (3.8 ms) and decreased conduction velocity (Wrist-2nd Digit, 37 m/s). The right median/ulnar (palm) comparison nerve showed abnormal peak latency difference (Median Palm-Ulnar Palm, 1.1 ms). All remaining nerves  were within normal limits. Left vs. Right side comparison data for the tibial motor nerve indicates abnormal L-R latency difference (1.9 ms). Needle evaluation of the right extensor digitorum brevis muscle showed increased motor unit amplitude, increased motor unit duration, rapid recruitment, and moderately decreased interference pattern. All remaining muscles (as indicated in the following table) showed no evidence of electrical instability. Impression:  Extensive electrodiagnostic examination of the right upper and lower extremities with additional nerve conduction studies of the left lower extremity reveals the followin. A right median neuropathy at or distal to the wrist, consistent with a clinical diagnosis of carpal tunnel syndrome, mild in degree electrically. 2. 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       3. Chronic motor axon loss changes on needle electrode examination of the right extensor digitorum brevis, mild to moderate in degree electrically. In isolation, findings are of uncertain clinical significance and may be seen with chronic local trauma, as from shoe wear. 4. No evidence of a generalized length-dependent polyneuropathy. 5. No evidence of a right cervical or lumbosacral motor radiculopathy. ___________________________  Melissa Ortiz,         Nerve Conduction Studies  Anti Sensory Summary Table     Stim Site NR Peak (ms) Norm Peak (ms) P-T Amp (µV) Norm P-T Amp Onset (ms) Site1 Site2 Delta-P (ms) Dist (cm) Adelfo (m/s) Norm Adelfo (m/s)   Right Median Anti Sensory (2nd Digit)  32.6°C   Wrist    3.8 <3.6 16.9 >10 3.1 Wrist 2nd Digit 3.8 14.0 37 >39   Left Sup Peroneal Anti Sensory (Ant Lat Mall)  31.6°C   14 cm    3.3 <4.4 8.2 >5.0 2.6 14 cm Ant Lat Mall 3.3 14.0 42 >32   Site 2    3.3  2.8  2.7         Site 3    3.2  9.7  2.5         Right Sup Peroneal Anti Sensory (Ant Lat Mall)  30.8°C   14 cm    2.9 <4.4 6.8 >5.0 2.3 14 cm Ant Lat Mall 2.9 14.0 48 >32   Site 2    2.9  10.1  2.4         Left Sural Anti Sensory (Lat Mall)  31.6°C   Calf    3.9 <4.0 0.2 >5.0 3.4 Calf Lat Mall 3.9 14.0 36 >35   Site 2    3.7  3.4  3.3         Site 3    3.8  1.8  3.2         Site 4    3.8  8.1  3.4         Right Sural Anti Sensory (Lat Mall)  30.3°C   Calf    3.8 <4.0 5.9 >5.0 2.8 Calf Lat Mall 3.8 14.0 37 >35   Right Ulnar Anti Sensory (5th Digit)  33.1°C   Wrist    3.6 <3.7 17.4 >15.0 3.0 Wrist 5th Digit 3.6 14.0 39 >38     Motor Summary Table     Stim Site NR Onset (ms) Norm Onset (ms) O-P Amp (mV) Norm O-P Amp Site1 Site2 Delta-0 (ms) Dist (cm) Adelfo (m/s) Norm Adelfo (m/s)   Right Median Motor (Abd Poll Brev)  32°C   Wrist    4.1 <4.2 4.4 >5 Elbow Wrist 3.8 19.0 50 >50   Elbow    7.9  4.1          Left Peroneal Motor (Ext Dig Brev)  30.6°C   Ankle    4.0 <6.1 2.5 >2.5 B Fib Ankle 6.0 29.0 48 >38   B Fib    10.0  2.1  Poplt B Fib 1.6 10.0 63 >40   Poplt    11.6  2.0          Right Peroneal Motor (Ext Dig Brev)  31.7°C   Ankle    5.6 <6.1 1.3 >2.5 B Fib Ankle 6.4 30.0 47 >38   B Fib    12.0  1.0  Poplt B Fib 1.8 10.0 56 >40   Poplt    13.8  0.9          Left Tibial Motor (Abd Gaxiola Brev)  31°C   Ankle    3.3 <6.1 7.1 >3.0 Knee Ankle 8.1 40.0 49 >35   Knee    11.4  5.7          Right Tibial Motor (Abd Gaxiola Brev)  31.7°C   Ankle    5.2 <6.1 6.2 >3.0 Knee Ankle 7.8 40.0 51 >35   Knee    13.0  6.7          Right Ulnar Motor (Abd Dig Minimi)  33.5°C   Wrist    2.9 <4.2 7.6 >3 B Elbow Wrist 3.0 17.0 57 >53   B Elbow    5.9  6.2  A Elbow B Elbow 2.3 10.0 43 >53   A Elbow    8.2  6.0              EMG     Side Muscle Nerve Root Ins Act Fibs Psw Amp Dur Poly Recrt Int Swift Betty Comment   Right Ext Dig Brev Dp Br Peronel L5, S1 Nml Nml Nml Incr >12ms 0 Rapid 75%    Right AbdHallucis MedPlantar S1-2 Nml Nml Nml Nml Nml 0 Nml Nml    Right PostTibialis Tibial L5, S1 Nml Nml Nml Nml Nml 0 Nml Nml    Right Gastroc Tibial S1-2 Nml Nml Nml Nml Nml 0 Nml Nml    Right AntTibialis Dp Br Peronel L4-5 Nml Nml Nml Nml Nml 0 Nml Nml    Right RectFemoris Femoral L2-4 Nml Nml Nml Nml Nml 0 Nml Nml    Right 1stDorInt Ulnar C8-T1 Nml Nml Nml Nml Nml 0 Nml Nml    Right Abd Poll Brev Median C8-T1 Nml Nml Nml Nml Nml 0 Nml Nml    Right FlexPolLong Median (Ant Int) C7-8 Nml Nml Nml Nml Nml 0 Nml Nml    Right Ext Indicis Radial (Post Int) C7-8 Nml Nml Nml Nml Nml 0 Nml Nml    Right Biceps Musculocut C5-6 Nml Nml Nml Nml Nml 0 Nml Nml    Right Triceps Radial C6-7-8 Nml Nml Nml Nml Nml 0 Nml Nml    Right Deltoid Axillary C5-6 Nml Nml Nml Nml Nml 0 Nml Nml          Waveforms:

## 2020-12-01 ENCOUNTER — HOSPITAL ENCOUNTER (OUTPATIENT)
Dept: MRI IMAGING | Age: 70
Discharge: HOME | End: 2020-12-01
Attending: PSYCHIATRY & NEUROLOGY
Payer: MEDICARE

## 2020-12-01 DIAGNOSIS — M51.37 DDD (DEGENERATIVE DISC DISEASE), LUMBOSACRAL: ICD-10-CM

## 2020-12-01 DIAGNOSIS — R20.2 NUMBNESS AND TINGLING OF BOTH LEGS: ICD-10-CM

## 2020-12-01 DIAGNOSIS — R20.0 NUMBNESS AND TINGLING OF BOTH LEGS: ICD-10-CM

## 2020-12-01 PROCEDURE — 72148 MRI LUMBAR SPINE W/O DYE: CPT

## 2020-12-02 NOTE — PROGRESS NOTES
Patient called, verified and given results, she stated \" I saw a neurosurgeon that did not do anything for me after my surgery, I do not understand, what can I do for all this, what am I supposed to do about my right hand, also what about my feet they are still numb. I feel like I am being told results but not what I can do about them. I would like to talk to Dr. Deyanira Frey about all this. Why do I have to wait until March 2020 to follow up? \"

## 2020-12-03 ENCOUNTER — TELEPHONE (OUTPATIENT)
Dept: NEUROLOGY | Age: 70
End: 2020-12-03

## 2020-12-03 DIAGNOSIS — M51.37 DDD (DEGENERATIVE DISC DISEASE), LUMBOSACRAL: Primary | ICD-10-CM

## 2020-12-03 NOTE — PROGRESS NOTES
I cannot fix any of these issues. I can only do my best to explain them. If she would like to discuss in more detail she can follow-up sooner than March.

## 2020-12-04 NOTE — TELEPHONE ENCOUNTER
Pt calling in regards to appt with Dr Maricel Mcmahon, would like to see if it could be in the afternoon.

## 2020-12-04 NOTE — TELEPHONE ENCOUNTER
Patient OV notes and referral faxed over to Guille Ramos at Neurosurgical, once Dr. Esa Lee nurse reviews notes, will call the patient to schedule, I did specify on the cover sheet that patient wanted an afternoon appointment.

## 2020-12-09 ENCOUNTER — TELEPHONE (OUTPATIENT)
Dept: NEUROLOGY | Age: 70
End: 2020-12-09

## 2020-12-09 NOTE — TELEPHONE ENCOUNTER
Patient calling because her hand and feet are getting worse. She is having problems walking. She doesn't have any feeling in her right hand. She wants something done ASAP, not just a phone call. States she needs help today. She wanted to talk to our  and was upset when she was informed she is on vacation. Also, she hasn't heard back from Dr. Catracho Stafford office.

## 2020-12-09 NOTE — TELEPHONE ENCOUNTER
Patient called and advised that her appointment with Dr. Leon Lemus was worked in for 12/17/2020 at the [address] [place] [address] as that is the first available. Patient became very frustrated as she stated it is too far for her to drive. I advised that she could contact the [facility] to inquire about cancellations, but that at this time they are booked till February per Lindsey Nichole at the [facility]. Patient was given appt date 12/17/2020 and time to arrive, by 2:15pm. I also gave her step by step driving instructions to the [address] [place] [address] neurosurgical location.

## 2020-12-10 ENCOUNTER — TELEPHONE (OUTPATIENT)
Dept: NEUROLOGY | Age: 70
End: 2020-12-10

## 2020-12-10 ENCOUNTER — DOCUMENTATION ONLY (OUTPATIENT)
Dept: NEUROLOGY | Age: 70
End: 2020-12-10

## 2020-12-10 NOTE — TELEPHONE ENCOUNTER
Pt calling back stating she talked to Dr. Murdock Severe office and they told her there are people at [address] [place] [address] that specialize in neuropathy. She called and spoke with [facility] and she isn't able to get an appt until May in [place] (which is the location she prefers). She stated they told her if our office called and stated it was urgent then they would be able to see her sooner. She is requesting someone call  [phone].

## 2020-12-10 NOTE — PROGRESS NOTES
I spoke with Dr. Rasta Arreola, it turns out this patient actually saw Dr. Myra Santo in the past and had surgery by him. She was told by him that he had no further management for her and she did not need to return. If Dr. Rasta Arreola will see her, she may do so, however I do not have any other options for her. We can always send her to orthopedics for the carpal tunnel in her right hand. Her EMG did not show any serious abnormalities such as CIDP or GBS. Her MRI also did not show any acute issue like tumor. My office cannot make other offices see her sooner at her convenience. If she is unhappy with her evaluation here she should consider seeing another neurological office.

## 2020-12-10 NOTE — TELEPHONE ENCOUNTER
Pt calling stating Dr. Santiago Almaraz told her there was nothing they could do for her and cancelled her appt. They stated the neurologist should be handling peripheral neuropathy. Pt also said she received the [employer] message mentioning being referred to an orthopedic dr for her carpal tunnel, she would like a call back to discuss. She said she doesn't really understand what her problems are. She said she doesn't know how to read her results and would like a call back explaining them. Pt apologizes stating she isn't trying to be difficult she just doesn't know what to do.  Please call back

## 2020-12-11 ENCOUNTER — TELEPHONE (OUTPATIENT)
Dept: NEUROLOGY | Age: 70
End: 2020-12-11

## 2020-12-11 NOTE — PROGRESS NOTES
Called and LVM for the patient per Caitlin Section, advising that since the issue is not considered an emergency, we cannot call to have the appointment emergently expedited but did advise that the patient could reach out to her PCP in attempts to having her appointment moved up or worked in at [facility] with a request submitted by her PCP.

## 2020-12-11 NOTE — TELEPHONE ENCOUNTER
Pt calling to find out if Dr. Royce Comer was going to refer her to ortho because her hand is still numb.  Please call back

## 2020-12-11 NOTE — TELEPHONE ENCOUNTER
Called and LVM for the patient per Georgina Leone, advising that since the issue is not considered an emergency, we cannot call to have the appointment emergently expedited but did advise that the patient could reach out to her PCP in attempts to having her appointment moved up or worked in at [facility] with a request submitted by her PCP.

## 2020-12-14 NOTE — TELEPHONE ENCOUNTER
Pt calling again stating she needs a call back asap re a referral to Ortho. I did tell her Dr. Marry Martínez was out of the office on Friday.  Please call back

## 2020-12-14 NOTE — TELEPHONE ENCOUNTER
In July she saw Pedro Luis Wilson for her carpal tunnel. I would recommend she call him and arrange a follow-up.

## 2020-12-14 NOTE — TELEPHONE ENCOUNTER
Patient left a voice message with Dr. Breana Bautista recommendation to see Dr. Kimberly Lara, requested a call back if patient has any further questions or concerns.

## 2020-12-16 ENCOUNTER — TELEPHONE (OUTPATIENT)
Dept: NEUROLOGY | Age: 70
End: 2020-12-16

## 2021-05-19 NOTE — PROGRESS NOTES
Okay let her know that I reviewed the imaging and she does have some worsening bulging disc now in the low back separate from her history of surgery. She might want to revisit this with her spine specialist.  The EMG we did shows the right carpal tunnel and right cubital tunnel issues which is another nerve issue in the elbow. Hopefully she has been able to see psychology like we discussed. Patient arrives from home with complaints of right sided headache and right sided dental pain.    Skin normal color for race, warm, dry and intact. No evidence of rash.

## 2021-09-21 ENCOUNTER — TRANSCRIBE ORDER (OUTPATIENT)
Dept: SCHEDULING | Age: 71
End: 2021-09-21

## 2021-09-21 DIAGNOSIS — I63.9 CEREBRAL INFARCTION (HCC): Primary | ICD-10-CM

## 2021-10-07 ENCOUNTER — HOSPITAL ENCOUNTER (OUTPATIENT)
Dept: MRI IMAGING | Age: 71
Discharge: HOME | End: 2021-10-07
Attending: STUDENT IN AN ORGANIZED HEALTH CARE EDUCATION/TRAINING PROGRAM
Payer: MEDICARE

## 2021-10-07 DIAGNOSIS — I63.9 CEREBRAL INFARCTION (HCC): ICD-10-CM

## 2021-10-07 PROCEDURE — 70551 MRI BRAIN STEM W/O DYE: CPT

## 2021-10-07 PROCEDURE — 70544 MR ANGIOGRAPHY HEAD W/O DYE: CPT

## 2021-12-15 ENCOUNTER — TRANSCRIBE ORDER (OUTPATIENT)
Dept: SCHEDULING | Age: 71
End: 2021-12-15

## 2021-12-15 DIAGNOSIS — H05.20 PROPTOSIS: Primary | ICD-10-CM

## 2021-12-15 DIAGNOSIS — H05.241 CONSTANT EXOPHTHALMOS OF RIGHT EYE: ICD-10-CM

## 2022-01-05 ENCOUNTER — HOSPITAL ENCOUNTER (OUTPATIENT)
Dept: CT IMAGING | Age: 72
Discharge: HOME | End: 2022-01-05
Attending: OPHTHALMOLOGY
Payer: MEDICARE

## 2022-01-05 DIAGNOSIS — H05.20 PROPTOSIS: ICD-10-CM

## 2022-01-05 DIAGNOSIS — H05.241 CONSTANT EXOPHTHALMOS OF RIGHT EYE: ICD-10-CM

## 2022-01-05 LAB — CREAT BLD-MCNC: 2 MG/DL (ref 0.6–1.3)

## 2022-01-05 PROCEDURE — 70480 CT ORBIT/EAR/FOSSA W/O DYE: CPT

## 2022-01-05 PROCEDURE — 82565 ASSAY OF CREATININE: CPT

## 2022-02-28 ENCOUNTER — HOSPITAL ENCOUNTER (INPATIENT)
Age: 72
LOS: 1 days | Discharge: NURSING, REHAB OR LONG-TERM CARE | DRG: 065 | End: 2022-03-04
Attending: EMERGENCY MEDICINE | Admitting: FAMILY MEDICINE
Payer: MEDICARE

## 2022-02-28 ENCOUNTER — APPOINTMENT (OUTPATIENT)
Dept: CT IMAGING | Age: 72
DRG: 065 | End: 2022-02-28
Attending: EMERGENCY MEDICINE
Payer: MEDICARE

## 2022-02-28 ENCOUNTER — APPOINTMENT (OUTPATIENT)
Dept: MRI IMAGING | Age: 72
DRG: 065 | End: 2022-02-28
Attending: FAMILY MEDICINE
Payer: MEDICARE

## 2022-02-28 DIAGNOSIS — R42 DIZZINESS: Primary | ICD-10-CM

## 2022-02-28 DIAGNOSIS — R77.8 ELEVATED TROPONIN: ICD-10-CM

## 2022-02-28 LAB
ALBUMIN SERPL-MCNC: 4 G/DL (ref 3.5–5)
ALBUMIN SERPL-MCNC: 4.2 G/DL (ref 3.5–5)
ALBUMIN/GLOB SERPL: 1 {RATIO} (ref 1.1–2.2)
ALBUMIN/GLOB SERPL: 1 {RATIO} (ref 1.1–2.2)
ALP SERPL-CCNC: 129 U/L (ref 45–117)
ALP SERPL-CCNC: 136 U/L (ref 45–117)
ALT SERPL-CCNC: 22 U/L (ref 12–78)
ALT SERPL-CCNC: 23 U/L (ref 12–78)
ANION GAP SERPL CALC-SCNC: 5 MMOL/L (ref 5–15)
ANION GAP SERPL CALC-SCNC: 5 MMOL/L (ref 5–15)
APTT PPP: 30.2 SEC (ref 22.1–31)
AST SERPL-CCNC: 15 U/L (ref 15–37)
AST SERPL-CCNC: 23 U/L (ref 15–37)
ATRIAL RATE: 98 BPM
BASOPHILS # BLD: 0.1 K/UL (ref 0–0.1)
BASOPHILS NFR BLD: 1 % (ref 0–1)
BILIRUB SERPL-MCNC: 0.6 MG/DL (ref 0.2–1)
BILIRUB SERPL-MCNC: 0.7 MG/DL (ref 0.2–1)
BUN SERPL-MCNC: 35 MG/DL (ref 6–20)
BUN SERPL-MCNC: 36 MG/DL (ref 6–20)
BUN/CREAT SERPL: 18 (ref 12–20)
BUN/CREAT SERPL: 19 (ref 12–20)
CALCIUM SERPL-MCNC: 10 MG/DL (ref 8.5–10.1)
CALCIUM SERPL-MCNC: 10.2 MG/DL (ref 8.5–10.1)
CALCULATED P AXIS, ECG09: 57 DEGREES
CALCULATED R AXIS, ECG10: -23 DEGREES
CALCULATED T AXIS, ECG11: 19 DEGREES
CHLORIDE SERPL-SCNC: 109 MMOL/L (ref 97–108)
CHLORIDE SERPL-SCNC: 110 MMOL/L (ref 97–108)
CK SERPL-CCNC: 127 U/L (ref 26–192)
CO2 SERPL-SCNC: 22 MMOL/L (ref 21–32)
CO2 SERPL-SCNC: 24 MMOL/L (ref 21–32)
COMMENT, HOLDF: NORMAL
CREAT SERPL-MCNC: 1.93 MG/DL (ref 0.55–1.02)
CREAT SERPL-MCNC: 1.99 MG/DL (ref 0.55–1.02)
DIAGNOSIS, 93000: NORMAL
DIFFERENTIAL METHOD BLD: ABNORMAL
EOSINOPHIL # BLD: 0 K/UL (ref 0–0.4)
EOSINOPHIL NFR BLD: 0 % (ref 0–7)
ERYTHROCYTE [DISTWIDTH] IN BLOOD BY AUTOMATED COUNT: 14.3 % (ref 11.5–14.5)
GLOBULIN SER CALC-MCNC: 4.2 G/DL (ref 2–4)
GLOBULIN SER CALC-MCNC: 4.2 G/DL (ref 2–4)
GLUCOSE BLD STRIP.AUTO-MCNC: 101 MG/DL (ref 65–117)
GLUCOSE SERPL-MCNC: 131 MG/DL (ref 65–100)
GLUCOSE SERPL-MCNC: 225 MG/DL (ref 65–100)
HCT VFR BLD AUTO: 51.3 % (ref 35–47)
HGB BLD-MCNC: 16.7 G/DL (ref 11.5–16)
IMM GRANULOCYTES # BLD AUTO: 0.1 K/UL (ref 0–0.04)
IMM GRANULOCYTES NFR BLD AUTO: 1 % (ref 0–0.5)
INR PPP: 1 (ref 0.9–1.1)
LYMPHOCYTES # BLD: 1.8 K/UL (ref 0.8–3.5)
LYMPHOCYTES NFR BLD: 20 % (ref 12–49)
MCH RBC QN AUTO: 29.6 PG (ref 26–34)
MCHC RBC AUTO-ENTMCNC: 32.6 G/DL (ref 30–36.5)
MCV RBC AUTO: 90.8 FL (ref 80–99)
MONOCYTES # BLD: 0.7 K/UL (ref 0–1)
MONOCYTES NFR BLD: 8 % (ref 5–13)
NEUTS SEG # BLD: 6.2 K/UL (ref 1.8–8)
NEUTS SEG NFR BLD: 70 % (ref 32–75)
NRBC # BLD: 0 K/UL (ref 0–0.01)
NRBC BLD-RTO: 0 PER 100 WBC
P-R INTERVAL, ECG05: 138 MS
PLATELET # BLD AUTO: 377 K/UL (ref 150–400)
PMV BLD AUTO: 9.5 FL (ref 8.9–12.9)
POTASSIUM SERPL-SCNC: 3.9 MMOL/L (ref 3.5–5.1)
POTASSIUM SERPL-SCNC: 3.9 MMOL/L (ref 3.5–5.1)
PROT SERPL-MCNC: 8.2 G/DL (ref 6.4–8.2)
PROT SERPL-MCNC: 8.4 G/DL (ref 6.4–8.2)
PROTHROMBIN TIME: 10.7 SEC (ref 9–11.1)
Q-T INTERVAL, ECG07: 380 MS
QRS DURATION, ECG06: 82 MS
QTC CALCULATION (BEZET), ECG08: 485 MS
RBC # BLD AUTO: 5.65 M/UL (ref 3.8–5.2)
SAMPLES BEING HELD,HOLD: NORMAL
SERVICE CMNT-IMP: NORMAL
SODIUM SERPL-SCNC: 136 MMOL/L (ref 136–145)
SODIUM SERPL-SCNC: 139 MMOL/L (ref 136–145)
THERAPEUTIC RANGE,PTTT: NORMAL SECS (ref 58–77)
TROPONIN-HIGH SENSITIVITY: 171 NG/L (ref 0–51)
TROPONIN-HIGH SENSITIVITY: 181 NG/L (ref 0–51)
VENTRICULAR RATE, ECG03: 98 BPM
WBC # BLD AUTO: 8.9 K/UL (ref 3.6–11)

## 2022-02-28 PROCEDURE — 80053 COMPREHEN METABOLIC PANEL: CPT

## 2022-02-28 PROCEDURE — 74011250636 HC RX REV CODE- 250/636: Performed by: FAMILY MEDICINE

## 2022-02-28 PROCEDURE — 85025 COMPLETE CBC W/AUTO DIFF WBC: CPT

## 2022-02-28 PROCEDURE — 70551 MRI BRAIN STEM W/O DYE: CPT

## 2022-02-28 PROCEDURE — 93005 ELECTROCARDIOGRAM TRACING: CPT

## 2022-02-28 PROCEDURE — 82962 GLUCOSE BLOOD TEST: CPT

## 2022-02-28 PROCEDURE — 96374 THER/PROPH/DIAG INJ IV PUSH: CPT

## 2022-02-28 PROCEDURE — 74011250637 HC RX REV CODE- 250/637: Performed by: FAMILY MEDICINE

## 2022-02-28 PROCEDURE — 94762 N-INVAS EAR/PLS OXIMTRY CONT: CPT

## 2022-02-28 PROCEDURE — 85610 PROTHROMBIN TIME: CPT

## 2022-02-28 PROCEDURE — 84484 ASSAY OF TROPONIN QUANT: CPT

## 2022-02-28 PROCEDURE — 74011250637 HC RX REV CODE- 250/637: Performed by: EMERGENCY MEDICINE

## 2022-02-28 PROCEDURE — 99215 OFFICE O/P EST HI 40 MIN: CPT | Performed by: PSYCHIATRY & NEUROLOGY

## 2022-02-28 PROCEDURE — 82550 ASSAY OF CK (CPK): CPT

## 2022-02-28 PROCEDURE — G0378 HOSPITAL OBSERVATION PER HR: HCPCS

## 2022-02-28 PROCEDURE — 70450 CT HEAD/BRAIN W/O DYE: CPT

## 2022-02-28 PROCEDURE — 85730 THROMBOPLASTIN TIME PARTIAL: CPT

## 2022-02-28 PROCEDURE — 99285 EMERGENCY DEPT VISIT HI MDM: CPT

## 2022-02-28 PROCEDURE — 74011250636 HC RX REV CODE- 250/636: Performed by: EMERGENCY MEDICINE

## 2022-02-28 PROCEDURE — 36415 COLL VENOUS BLD VENIPUNCTURE: CPT

## 2022-02-28 RX ORDER — MECLIZINE HCL 12.5 MG 12.5 MG/1
25 TABLET ORAL 3 TIMES DAILY
Status: DISCONTINUED | OUTPATIENT
Start: 2022-02-28 | End: 2022-03-02

## 2022-02-28 RX ORDER — MECLIZINE HCL 12.5 MG 12.5 MG/1
25 TABLET ORAL
Status: ACTIVE | OUTPATIENT
Start: 2022-02-28 | End: 2022-02-28

## 2022-02-28 RX ORDER — GLIPIZIDE 5 MG/1
20 TABLET ORAL DAILY
Status: DISCONTINUED | OUTPATIENT
Start: 2022-03-01 | End: 2022-03-04 | Stop reason: HOSPADM

## 2022-02-28 RX ORDER — HYDRALAZINE HYDROCHLORIDE 50 MG/1
25 TABLET, FILM COATED ORAL
Status: COMPLETED | OUTPATIENT
Start: 2022-02-28 | End: 2022-02-28

## 2022-02-28 RX ORDER — ACETAMINOPHEN 650 MG/1
650 SUPPOSITORY RECTAL
Status: DISCONTINUED | OUTPATIENT
Start: 2022-02-28 | End: 2022-03-04 | Stop reason: HOSPADM

## 2022-02-28 RX ORDER — INSULIN LISPRO 100 [IU]/ML
INJECTION, SOLUTION INTRAVENOUS; SUBCUTANEOUS
Status: DISCONTINUED | OUTPATIENT
Start: 2022-02-28 | End: 2022-03-04 | Stop reason: HOSPADM

## 2022-02-28 RX ORDER — HYDRALAZINE HYDROCHLORIDE 25 MG/1
25 TABLET, FILM COATED ORAL 3 TIMES DAILY
Status: DISCONTINUED | OUTPATIENT
Start: 2022-02-28 | End: 2022-03-02

## 2022-02-28 RX ORDER — GUAIFENESIN 100 MG/5ML
81 LIQUID (ML) ORAL DAILY
Status: DISCONTINUED | OUTPATIENT
Start: 2022-03-01 | End: 2022-03-01

## 2022-02-28 RX ORDER — GUAIFENESIN 100 MG/5ML
81 LIQUID (ML) ORAL DAILY
Status: DISCONTINUED | OUTPATIENT
Start: 2022-03-01 | End: 2022-02-28 | Stop reason: SDUPTHER

## 2022-02-28 RX ORDER — HYDRALAZINE HYDROCHLORIDE 20 MG/ML
10 INJECTION INTRAMUSCULAR; INTRAVENOUS
Status: DISCONTINUED | OUTPATIENT
Start: 2022-02-28 | End: 2022-03-04 | Stop reason: HOSPADM

## 2022-02-28 RX ORDER — MAGNESIUM SULFATE 100 %
4 CRYSTALS MISCELLANEOUS AS NEEDED
Status: DISCONTINUED | OUTPATIENT
Start: 2022-02-28 | End: 2022-03-04 | Stop reason: HOSPADM

## 2022-02-28 RX ORDER — SERTRALINE HYDROCHLORIDE 50 MG/1
25 TABLET, FILM COATED ORAL
Status: DISCONTINUED | OUTPATIENT
Start: 2022-02-28 | End: 2022-03-04 | Stop reason: HOSPADM

## 2022-02-28 RX ORDER — SODIUM CHLORIDE 9 MG/ML
75 INJECTION, SOLUTION INTRAVENOUS CONTINUOUS
Status: DISPENSED | OUTPATIENT
Start: 2022-02-28 | End: 2022-03-01

## 2022-02-28 RX ORDER — ATORVASTATIN CALCIUM 40 MG/1
80 TABLET, FILM COATED ORAL
Status: DISCONTINUED | OUTPATIENT
Start: 2022-02-28 | End: 2022-03-04 | Stop reason: HOSPADM

## 2022-02-28 RX ORDER — METOPROLOL SUCCINATE 50 MG/1
50 TABLET, EXTENDED RELEASE ORAL DAILY
Status: DISCONTINUED | OUTPATIENT
Start: 2022-03-01 | End: 2022-03-04 | Stop reason: HOSPADM

## 2022-02-28 RX ORDER — CLONIDINE HYDROCHLORIDE 0.1 MG/1
0.1 TABLET ORAL
Status: COMPLETED | OUTPATIENT
Start: 2022-02-28 | End: 2022-02-28

## 2022-02-28 RX ORDER — ACETAMINOPHEN 325 MG/1
650 TABLET ORAL
Status: DISCONTINUED | OUTPATIENT
Start: 2022-02-28 | End: 2022-03-04 | Stop reason: HOSPADM

## 2022-02-28 RX ORDER — METFORMIN HYDROCHLORIDE 500 MG/1
1000 TABLET ORAL 2 TIMES DAILY WITH MEALS
Status: DISCONTINUED | OUTPATIENT
Start: 2022-02-28 | End: 2022-03-04 | Stop reason: HOSPADM

## 2022-02-28 RX ORDER — CLONIDINE HYDROCHLORIDE 0.1 MG/1
0.1 TABLET ORAL 2 TIMES DAILY
Status: DISCONTINUED | OUTPATIENT
Start: 2022-02-28 | End: 2022-03-04 | Stop reason: HOSPADM

## 2022-02-28 RX ORDER — FAMOTIDINE 20 MG/1
20 TABLET, FILM COATED ORAL DAILY
Status: DISCONTINUED | OUTPATIENT
Start: 2022-03-01 | End: 2022-03-04 | Stop reason: HOSPADM

## 2022-02-28 RX ADMIN — SODIUM CHLORIDE 75 ML/HR: 9 INJECTION, SOLUTION INTRAVENOUS at 19:21

## 2022-02-28 RX ADMIN — CLONIDINE HYDROCHLORIDE 0.1 MG: 0.1 TABLET ORAL at 14:08

## 2022-02-28 RX ADMIN — HYDRALAZINE HYDROCHLORIDE 10 MG: 20 INJECTION, SOLUTION INTRAMUSCULAR; INTRAVENOUS at 19:21

## 2022-02-28 RX ADMIN — SERTRALINE 25 MG: 50 TABLET, FILM COATED ORAL at 20:00

## 2022-02-28 RX ADMIN — HYDRALAZINE HYDROCHLORIDE 25 MG: 50 TABLET, FILM COATED ORAL at 14:08

## 2022-02-28 RX ADMIN — CLONIDINE HYDROCHLORIDE 0.1 MG: 0.1 TABLET ORAL at 19:21

## 2022-02-28 NOTE — H&P
[address] [facility]  Hospitalist Group  History and Physical    Date of Service:  2/28/2022  Primary Care Provider: Malka Pastor MD  Source of information: The patient and Chart review    Chief Complaint: Dizziness      History of Presenting Illness:   Gustavo Mcwilliams is a 80 y.o. female who presents with dizziness    History was primarily obtained from the patient, patient is extremely hard of hearing and is a poor historian    Patient presented to the ER with dizziness, reports her symptoms started 3 days back, reports that she feels dizzy and lightheaded, feels as if she was in a fall, came to the ER today, deemed as a code stroke and was requested to be admitted to the hospital service, patient reports that she has had off-and-on dizziness in the past but is not able to elaborate the same, patient denies any other complaints or problem    The patient denies any headache, blurry vision, sore throat, trouble swallowing, trouble with speech, chest pain, SOB, cough, fever, chills, N/V/D, abd pain, urinary symptoms, constipation, recent travels, sick contacts, , falls, injuries, rashes, contact with COVID-19 diagnosed patients, hematemesis, melena, hemoptysis, hematuria, rashes, denies starting any new medications and denies any other concerns or problems besides as mentioned above. REVIEW OF SYSTEMS:  A comprehensive review of systems was negative except for that written in the History of Present Illness.      Past Medical History:   Diagnosis Date    Arthritis     Chronic pain     Diabetes ([facility]     GERD (gastroesophageal reflux disease)     Hypertension     Ill-defined condition     ucerative colitis    Ulcerative colitis ([facility]     Vertigo       Past Surgical History:   Procedure Laterality Date    HX GI      colonoscopy    HX ORTHOPAEDIC Right 2014    FOOT    HX OTHER SURGICAL      finger surgery, foot surgery     Prior to Admission medications    Medication Sig Start Date End Date Taking? Authorizing Provider   dulaglutide (Trulicity) 1.5 IZ/0.7 mL sub-q pen 1.5 mg by SubCUTAneous route every seven (7) days. Provider, Historical   hydrALAZINE (APRESOLINE) 25 mg tablet Take 25 mg by mouth three (3) times daily. Provider, Historical   aspirin 81 mg chewable tablet Take 81 mg by mouth daily. Provider, Historical   sertraline (ZOLOFT) 25 mg tablet Take 1 Tab by mouth nightly. 11/10/20   Shivam Fox DO   acetaminophen (TYLENOL) 325 mg tablet Take 2 Tabs by mouth every six (6) hours as needed for Pain. 3/16/19   Jakob Lazaro MD   oxyCODONE-acetaminophen (PERCOCET 10)  mg per tablet Take 1 Tab by mouth every four (4) hours as needed. Max Daily Amount: 6 Tabs. 6/22/17   Cristopher Gage NP   polyethylene glycol [facility]) 17 gram packet Take 1 Packet by mouth daily. 6/22/17   Cristopher Gage NP   senna-docusate (PERICOLACE) 8.6-50 mg per tablet Take 1 Tab by mouth daily. 6/22/17   Cristopher Gage NP   glipiZIDE (GLUCOTROL) 10 mg tablet Take 20 mg by mouth daily. Provider, Historical   cloNIDine HCl (CATAPRES) 0.1 mg tablet Take 0.1 mg by mouth two (2) times a day. Provider, Historical   metoprolol succinate (TOPROL-XL) 50 mg XL tablet Take 50 mg by mouth daily. Provider, Historical   losartan-hydroCHLOROthiazide (HYZAAR) 100-25 mg per tablet Take 1 Tab by mouth daily. Provider, Historical   raNITIdine (ZANTAC) 150 mg tablet Take 150 mg by mouth daily. Provider, Historical   metFORMIN (GLUCOPHAGE) 1,000 mg tablet Take 1,000 mg by mouth two (2) times daily (with meals). Other, MD Lucho     No Known Allergies   Family History   Problem Relation Age of Onset    Heart Disease Mother     Diabetes Mother     Diabetes Father     Hearing Impairment Brother       Social History:  reports that she has never smoked. She has never used smokeless tobacco. She reports that she does not drink alcohol and does not use drugs.      Family and social history were personally reviewed, all pertinent and relevant details are outlined as above. Objective:     Visit Vitals  BP (!) 184/113   Pulse (!) 128   Temp 97.8 °F (36.6 °C)   Resp 15   Ht 5' 5\" (1.651 m)   Wt 76.1 kg (167 lb 12.3 oz)   SpO2 98%   BMI 27.92 kg/m²      O2 Device: None (Room air)    PHYSICAL EXAM:   General: Alert x oriented x 3, extremely hard of hearing  HEENT: PEERL, EOMI, moist mucus membranes  Neck: Supple, no JVD, no meningeal signs  Chest: Clear to auscultation bilaterally   CVS: RRR, S1 S2 heard, no murmurs/rubs/gallops  Abd: Soft, non-tender, non-distended, +bowel sounds   Ext: No clubbing, no cyanosis, no edema  Neuro/Psych: Pleasant mood and affect, CN 2-12 grossly intact, sensory grossly within normal limit, Strength 5/5 in all extremities, DTR 1+ x 4  Cap refill: Brisk, less than 3 seconds  Pulses: 2+, symmetric in all extremities  Skin: Warm, dry, without rashes or lesions    Data Review: All diagnostic labs and studies have been reviewed. Abnormal Labs Reviewed   CBC WITH AUTOMATED DIFF - Abnormal; Notable for the following components:       Result Value    RBC 5.65 (*)     HGB 16.7 (*)     HCT 51.3 (*)     IMMATURE GRANULOCYTES 1 (*)     ABS. IMM. GRANS. 0.1 (*)     All other components within normal limits   METABOLIC PANEL, COMPREHENSIVE - Abnormal; Notable for the following components:    Chloride 110 (*)     Glucose 131 (*)     BUN 36 (*)     Creatinine 1.93 (*)     GFR est AA 31 (*)     GFR est non-AA 26 (*)     Calcium 10.2 (*)     Alk. phosphatase 136 (*)     Protein, total 8.4 (*)     Globulin 4.2 (*)     A-G Ratio 1.0 (*)     All other components within normal limits   TROPONIN-HIGH SENSITIVITY - Abnormal; Notable for the following components:    Troponin-High Sensitivity 171 (*)     All other components within normal limits       All Micro Results     None          IMAGING:   CT HEAD WO CONT   Final Result      No acute intracranial abnormality on this noncontrast head CT. No change given   difference in technique. MRI BRAIN WO CONT    (Results Pending)        ECG/ECHO:    Results for orders placed or performed during the hospital encounter of 02/28/22   EKG, 12 LEAD, INITIAL   Result Value Ref Range    Ventricular Rate 98 BPM    Atrial Rate 98 BPM    P-R Interval 138 ms    QRS Duration 82 ms    Q-T Interval 380 ms    QTC Calculation (Bezet) 485 ms    Calculated P Axis 57 degrees    Calculated R Axis -23 degrees    Calculated T Axis 19 degrees    Diagnosis       Normal sinus rhythm  Right atrial enlargement  Septal infarct , age undetermined  Abnormal ECG  When compared with ECG of 07-JUN-2017 10:55,  Septal infarct is now present  Nonspecific T wave abnormality, improved in Inferior leads  T wave inversion no longer evident in Lateral leads          Assessment:   Given the patient's current clinical presentation, there is a high level of concern for decompensation if discharged from the emergency department. Complex decision making was performed, which includes reviewing the patient's available past medical records, laboratory results, and imaging studies.     Dizziness  Accelerated hypertension  Elevated troponin  ALEJANDRO on CKD  Diabetes mellitus type 2    Plan:   Patient will be observed on a telemetry bed, likely chronic and peripheral, continue meclizine, MRI of the brain, aspirin, statin neurovascular checks, appreciate neurology input, PT OT speech consult, fall precautions, IV hydration, orthostatic vitals, telemetry and further intervention per hospital course  Allow permissive hypertension, hydralazine IV for systolic greater than 312, monitor, continue home meds  Likely demand, cycle troponins, aspirin, echo, monitor, if trending up may consider any cardiology consult  Likely prerenal, gentle IV hydration, avoid nephrotoxic medication, renally dose all medication, trend creatinine and continue monitor  Sliding-scale insulin, checks, diet control, close      DIET: ADULT DIET Regular; 4 carb choices (60 gm/meal); Low Fat/Low Chol/High Fiber/2 gm Na; Low Sodium (2 gm)   ISOLATION PRECAUTIONS: There are currently no Active Isolations  CODE STATUS: Full Code   DVT PROPHYLAXIS: SCDs  FUNCTIONAL STATUS PRIOR TO HOSPITALIZATION: Fully active and ambulatory; able to carry on all self-care without restriction. EARLY MOBILITY ASSESSMENT: Recommend routine ambulation while hospitalized with the assistance of nursing staff  ANTICIPATED DISCHARGE: 24-48 hours. Signed By: Sebas Waggoner MD     February 28, 2022         Please note that this dictation may have been completed with Dragon, the [employer] voice recognition software. Quite often unanticipated grammatical, syntax, homophones, and other interpretive errors are inadvertently transcribed by the computer software. Please disregard these errors. Please excuse any errors that have escaped final proofreading.

## 2022-02-28 NOTE — Clinical Note
Patient Class[de-identified] OBSERVATION [721]   Type of Bed: Neuro/Stroke [9]   Cardiac Monitoring Required?: Yes   Reason for Observation: dizziness   Admitting Diagnosis: Dizziness [209339]   Admitting Physician: Carmen Giang.[place]   Attending Physician: Lanny Ponce

## 2022-02-28 NOTE — ED TRIAGE NOTES
Patient is coming in with dizziness since Saturday. Patient was seen at [facility] and had fall on Saturday. Patient is on heart monitor. Elevated blood pressure.

## 2022-02-28 NOTE — ED PROVIDER NOTES
This is a 71-year-old female with a history of hypertension, ulcerative colitis and a past history of vertigo. She also has diabetes and arthritis. She presents with an onset of dizziness on Saturday. She states that since that time as long as she is laying down and resting she has no significant symptomatology. It is only when she gets up to move around she becomes dizzy and lightheaded. She states at 1 point she fell on Saturday. She does state that she has had some surgeries on her back and she has some instability associated with that complaint. She is unsure how much of that is contributing to her instability when she stands, however, there is clear complaint of dizziness when she stands or sits up. She has had no fever or chills, nausea or vomiting and no shortness of breath or chest pain. She denies any particular weakness in 1 arm or leg. He has no headache and has had no new visual symptoms. She states she does have a history of hypertension but has not taken her blood pressure medicines this morning.            Past Medical History:   Diagnosis Date    Arthritis     Chronic pain     Diabetes ([facility] [address]     GERD (gastroesophageal reflux disease)     Hypertension     Ill-defined condition     ucerative colitis    Ulcerative colitis ([facility] [address]     Vertigo        Past Surgical History:   Procedure Laterality Date    HX GI      colonoscopy    HX ORTHOPAEDIC Right 2014    FOOT    HX OTHER SURGICAL      finger surgery, foot surgery         Family History:   Problem Relation Age of Onset    Heart Disease Mother     Diabetes Mother     Diabetes Father     Hearing Impairment Brother        Social History     Socioeconomic History    Marital status: SINGLE     Spouse name: Not on file    Number of children: Not on file    Years of education: Not on file    Highest education level: Not on file   Occupational History    Not on file   Tobacco Use    Smoking status: Never Smoker    Smokeless tobacco: Never Used   Substance and Sexual Activity    Alcohol use: No    Drug use: No    Sexual activity: Not on file   Other Topics Concern    Not on file   Social History Narrative    Not on file     Social Determinants of Health     Financial Resource Strain:     Difficulty of Paying Living Expenses: Not on file   Food Insecurity:     Worried About Running Out of Food in the Last Year: Not on file    Ember of Food in the Last Year: Not on file   Transportation Needs:     Lack of Transportation (Medical): Not on file    Lack of Transportation (Non-Medical): Not on file   Physical Activity:     Days of Exercise per Week: Not on file    Minutes of Exercise per Session: Not on file   Stress:     Feeling of Stress : Not on file   Social Connections:     Frequency of Communication with Friends and Family: Not on file    Frequency of Social Gatherings with Friends and Family: Not on file    Attends Christianity Services: Not on file    Active Member of [address] [place] [address] or Organizations: Not on file    Attends Club or Organization Meetings: Not on file    Marital Status: Not on file   Intimate Partner Violence:     Fear of Current or Ex-Partner: Not on file    Emotionally Abused: Not on file    Physically Abused: Not on file    Sexually Abused: Not on file   Housing Stability:     Unable to Pay for Housing in the Last Year: Not on file    Number of [place] in the Last Year: Not on file    Unstable Housing in the Last Year: Not on file         ALLERGIES: Patient has no known allergies. Review of Systems   Constitutional: Negative for activity change, appetite change and fatigue. HENT: Negative for ear pain, facial swelling, sore throat and trouble swallowing. Eyes: Negative for pain, discharge and visual disturbance. Respiratory: Negative for chest tightness, shortness of breath and wheezing. Cardiovascular: Negative for chest pain and palpitations.    Gastrointestinal: Negative for abdominal pain, blood in stool, nausea and vomiting. Genitourinary: Negative for difficulty urinating, flank pain and hematuria. Musculoskeletal: Negative for arthralgias, joint swelling, myalgias and neck pain. Skin: Negative for color change and rash. Neurological: Positive for dizziness. Negative for weakness, numbness and headaches. Some instability of her ability to walk. Hematological: Negative for adenopathy. Does not bruise/bleed easily. Psychiatric/Behavioral: Negative for behavioral problems, confusion and sleep disturbance. All other systems reviewed and are negative. Vitals:    02/28/22 1141   BP: (!) 208/117   Pulse: (!) 117   Resp: 20   Temp: 97.8 °F (36.6 °C)   SpO2: 98%   Weight: 76.1 kg (167 lb 12.3 oz)   Height: 5' 5\" (1.651 m)            Physical Exam  Vitals and nursing note reviewed. Constitutional:       General: She is not in acute distress. Appearance: She is well-developed. She is not ill-appearing or diaphoretic. Comments: This is a 67-year-old female who presents with dizziness when she tries to get up and move around. She is not dizzy laying on the stretcher. Vital signs are as noted. HENT:      Head: Normocephalic and atraumatic. Nose: Nose normal.   Eyes:      General: No scleral icterus. Conjunctiva/sclera: Conjunctivae normal.      Pupils: Pupils are equal, round, and reactive to light. Neck:      Thyroid: No thyromegaly. Vascular: No JVD. Trachea: No tracheal deviation. Comments: No carotid bruits noted. Cardiovascular:      Rate and Rhythm: Normal rate and regular rhythm. Heart sounds: Normal heart sounds. No murmur heard. No friction rub. No gallop. Pulmonary:      Effort: Pulmonary effort is normal. No respiratory distress. Breath sounds: Normal breath sounds. No wheezing or rales. Chest:      Chest wall: No tenderness. Abdominal:      General: Bowel sounds are normal. There is no distension. Palpations: Abdomen is soft. There is no mass. Tenderness: There is no abdominal tenderness. There is no guarding or rebound. Musculoskeletal:         General: No tenderness. Normal range of motion. Cervical back: Normal range of motion and neck supple. Lymphadenopathy:      Cervical: No cervical adenopathy. Skin:     General: Skin is warm and dry. Capillary Refill: Capillary refill takes 2 to 3 seconds. Findings: No erythema or rash. Neurological:      General: No focal deficit present. Mental Status: She is alert and oriented to person, place, and time. Cranial Nerves: No cranial nerve deficit. Coordination: Coordination normal.      Deep Tendon Reflexes: Reflexes are normal and symmetric. Psychiatric:         Behavior: Behavior normal.         Thought Content: Thought content normal.         Judgment: Judgment normal.          MDM  Number of Diagnoses or Management Options  Dizziness: new and requires workup  Elevated troponin: new and requires workup     Amount and/or Complexity of Data Reviewed  Clinical lab tests: ordered and reviewed  Tests in the radiology section of CPT®: ordered and reviewed  Decide to obtain previous medical records or to obtain history from someone other than the patient: yes  Review and summarize past medical records: yes  Discuss the patient with other providers: yes    Risk of Complications, Morbidity, and/or Mortality  Presenting problems: high  Diagnostic procedures: high  Management options: high    Patient Progress  Patient progress: stable         Procedures    ED MD EKG interpretation: There is a normal sinus rhythm at 98 beats a minute. Left axis shift is noted. No ectopy or acute ischemic change is noted. Intervals appear normal.Cj Yates MD      This is a 78-year-old female who presents with the onset of dizziness on Saturday.   She has had some difficulty walking and she is unclear whether this is from the dizziness or her back difficulty. She did fall once on Saturday. She has not been able to walk her dog because of the symptoms since Saturday. She called EMS because the dizziness was persisting today when she tries to move around. She has had no other acute symptomatology associated with this. She has a history of vertigo in the past.  She also has hypertension and is clearly hypertensive this morning and has not taken any of her medication. We will go ahead and give her her morning medication as well as some Antivert. Will evaluate with labs and CT as well as orthostatics and will reevaluate. The patient CT is just reported back and appears negative. 2:16 PM her blood sugar is at 100 and. Her blood pressure is in the 205/119 range. There is initial medication that I had asked the patient be given has not been given as yet. I put another order in specifically. Patient did not take any of her medications this morning. She ended up calling a neurologist at [facility]. She states she went to [facility] Saturday for the same problem. Now she is at [facility] for the same problem and says that she cannot have a closed MRI because she is claustrophobic. She is upset because she cannot have anything to eat and have told her as I did earlier other than the work-up of this dizziness, if her CT was negative she would have to have an MRI of her head and I did not want her to be eating and get in the scanner. I believe she is currently taking her medications for blood pressure at this point. I have placed a call for her primary physician and to Dr. Charlotte Reddy at [facility] who is her neurologist.  This is the physician she called earlier today. 2:18 PM      Perfect Serve Consult for Admission  3:46 PM    ED Room Number: UG19/30  Patient Name and age:  Grace Theodore 80 y.o.  female  Working Diagnosis:   1. Dizziness    2.  Elevated troponin        COVID-19 Suspicion:  no  Sepsis present:  no  Reassessment needed: no  Code Status: Full Code  Readmission: no  Isolation Requirements:  no  Recommended Level of Care:  telemetry  Department:[facility] Adult ED - 21   Other:  Neuro to follow     The patient has been seen by neurology. She is unable to get up off the bed due to dizziness. I have spoken with Dr. Yara Hamilton who is her primary physician. Patient will not be able to tolerate a closed MRI machine. Her blood pressure has responded to the medications given and is now down in the 414 systolic range. We will consult the hospitalist for admission. Patient's troponin is also noted to be elevated with a normal EKG.

## 2022-02-28 NOTE — PROGRESS NOTES
Reason for Admission:  Dizziness and Elevated troponin                     RUR Score:   NA / RRAT 8%           Plan for utilizing home health:          PCP: First and Last name:  Dustin Bacon MD     Name of Practice:    Are you a current patient: Yes/No:  yes   Approximate date of last visit:  Patient states \" it was a long long time ago. \" This writer attempted to ask month range/year states was some time in 2021. Can you participate in a virtual visit with your PCP:                     Current Advanced Directive/Advance Care Plan: No Order / Patient has AMD listing son Hoang Zarate (primary [phone]) and Rian Christensen (ex-spouse [phone])      Healthcare Decision Maker:   Click here to complete [id number] including selection of the Healthcare Decision Maker Relationship (ie \"Primary\")                             Transition of Care Plan:  SSED/CM consult received and appreciated. Noted [facility]. Previous CM note in 2017 referral and SNF placement [facility] Met w/patient and introduced to role of CM. Janice Tolentino states \" I want to get out of here. I want to go home. I've been here too long. \" This writer acknowledged still having elevated BP and safety concern since lives alone and having dizziness. History provided  states I don't have anyone to help me. CM asked about emergency contacts and MPOAs . Ms. Vale Hernandez was tearful stating my  Son is in [place] he cant do anything (no car and just got ); ex spouse only picked up from [facility] and returned home; 80year old aunt is in a facility per patient \" she cant help. \" Noted friend listed Ji Slater patient informed friend states \" its too much on her being her care person. \"    Janice Tolentino informed CM she has placed a down payment for [facility]. Unsure when will be accepted or can afford.  Patient receives Meals on Wheels (MOW / 5 days/week) during visit continues to say needs help including housekeeping and wanting to go home. Discussed the need to safety demonstrate walking and being able to take care of self. Patient has dog and states must be taken outside to use the bathroom but she is unable to walk her dog out (history of hip surgery and needs knee surgery). Ms. Juana Bianchi informed CM of cataract surgery however 1 eye did not improve vision. Patient acknowledged having a stroke history and is followed by [facility] Neurology has [facility] working to help her [phone] (provided number saved on her phone). This writer informed patient will communicate w/ LCSW regarding transitional care planning. VM left for Giulia. Per patient had called Dr. Rosemary Sewell office today and RN called [phone] for Wellness check. Case Management will continue to follow for transitions of care needs.

## 2022-03-01 ENCOUNTER — APPOINTMENT (OUTPATIENT)
Dept: VASCULAR SURGERY | Age: 72
DRG: 065 | End: 2022-03-01
Attending: PSYCHIATRY & NEUROLOGY
Payer: MEDICARE

## 2022-03-01 ENCOUNTER — APPOINTMENT (OUTPATIENT)
Dept: NON INVASIVE DIAGNOSTICS | Age: 72
DRG: 065 | End: 2022-03-01
Attending: FAMILY MEDICINE
Payer: MEDICARE

## 2022-03-01 LAB
AMPHET UR QL SCN: NEGATIVE
ANION GAP SERPL CALC-SCNC: 4 MMOL/L (ref 5–15)
BARBITURATES UR QL SCN: NEGATIVE
BENZODIAZ UR QL: NEGATIVE
BUN SERPL-MCNC: 43 MG/DL (ref 6–20)
BUN/CREAT SERPL: 22 (ref 12–20)
CALCIUM SERPL-MCNC: 9.7 MG/DL (ref 8.5–10.1)
CANNABINOIDS UR QL SCN: NEGATIVE
CHLORIDE SERPL-SCNC: 112 MMOL/L (ref 97–108)
CHOLEST SERPL-MCNC: 194 MG/DL
CK SERPL-CCNC: 100 U/L (ref 26–192)
CO2 SERPL-SCNC: 22 MMOL/L (ref 21–32)
COCAINE UR QL SCN: NEGATIVE
CREAT SERPL-MCNC: 1.92 MG/DL (ref 0.55–1.02)
CRP SERPL HS-MCNC: 4.7 MG/L
DRUG SCRN COMMENT,DRGCM: NORMAL
ECHO AO ROOT DIAM: 4 CM
ECHO AO ROOT INDEX: 2.19 CM/M2
ECHO AV AREA PEAK VELOCITY: 2.6 CM2
ECHO AV AREA PEAK VELOCITY: 2.6 CM2
ECHO AV PEAK GRADIENT: 6 MMHG
ECHO AV PEAK VELOCITY: 1.2 M/S
ECHO AV VELOCITY RATIO: 0.75
ECHO EST RA PRESSURE: 3 MMHG
ECHO LA DIAMETER INDEX: 2.9 CM/M2
ECHO LA DIAMETER: 5.3 CM
ECHO LA TO AORTIC ROOT RATIO: 1.33
ECHO LV E' LATERAL VELOCITY: 6 CM/S
ECHO LV E' SEPTAL VELOCITY: 4 CM/S
ECHO LV FRACTIONAL SHORTENING: 29 % (ref 28–44)
ECHO LV INTERNAL DIMENSION DIASTOLE INDEX: 3.06 CM/M2
ECHO LV INTERNAL DIMENSION DIASTOLIC: 5.6 CM (ref 3.9–5.3)
ECHO LV INTERNAL DIMENSION SYSTOLIC INDEX: 2.19 CM/M2
ECHO LV INTERNAL DIMENSION SYSTOLIC: 4 CM
ECHO LV IVSD: 1 CM (ref 0.6–0.9)
ECHO LV MASS 2D: 234.3 G (ref 67–162)
ECHO LV MASS INDEX 2D: 128 G/M2 (ref 43–95)
ECHO LV POSTERIOR WALL DIASTOLIC: 1.1 CM (ref 0.6–0.9)
ECHO LV RELATIVE WALL THICKNESS RATIO: 0.39
ECHO LVOT AREA: 3.5 CM2
ECHO LVOT DIAM: 2.1 CM
ECHO LVOT PEAK GRADIENT: 3 MMHG
ECHO LVOT PEAK VELOCITY: 0.9 M/S
ECHO MV A VELOCITY: 1.1 M/S
ECHO MV AREA PHT: 2.6 CM2
ECHO MV E DECELERATION TIME (DT): 291.5 MS
ECHO MV E VELOCITY: 0.67 M/S
ECHO MV E/A RATIO: 0.61
ECHO MV E/E' LATERAL: 11.17
ECHO MV E/E' RATIO (AVERAGED): 13.96
ECHO MV E/E' SEPTAL: 16.75
ECHO MV PRESSURE HALF TIME (PHT): 84.5 MS
ECHO PV MAX VELOCITY: 0.8 M/S
ECHO PV PEAK GRADIENT: 2 MMHG
ECHO RV INTERNAL DIMENSION: 3.4 CM
ERYTHROCYTE [DISTWIDTH] IN BLOOD BY AUTOMATED COUNT: 14.2 % (ref 11.5–14.5)
ERYTHROCYTE [SEDIMENTATION RATE] IN BLOOD: 8 MM/HR (ref 0–30)
EST. AVERAGE GLUCOSE BLD GHB EST-MCNC: 143 MG/DL
GLUCOSE BLD STRIP.AUTO-MCNC: 127 MG/DL (ref 65–117)
GLUCOSE BLD STRIP.AUTO-MCNC: 158 MG/DL (ref 65–117)
GLUCOSE BLD STRIP.AUTO-MCNC: 91 MG/DL (ref 65–117)
GLUCOSE SERPL-MCNC: 148 MG/DL (ref 65–100)
HBA1C MFR BLD: 6.6 % (ref 4–5.6)
HCT VFR BLD AUTO: 45.6 % (ref 35–47)
HDLC SERPL-MCNC: 53 MG/DL
HDLC SERPL: 3.7 {RATIO} (ref 0–5)
HGB BLD-MCNC: 15 G/DL (ref 11.5–16)
INR PPP: 1 (ref 0.9–1.1)
LDLC SERPL CALC-MCNC: 117.4 MG/DL (ref 0–100)
MAGNESIUM SERPL-MCNC: 2.2 MG/DL (ref 1.6–2.4)
MCH RBC QN AUTO: 29.7 PG (ref 26–34)
MCHC RBC AUTO-ENTMCNC: 32.9 G/DL (ref 30–36.5)
MCV RBC AUTO: 90.3 FL (ref 80–99)
METHADONE UR QL: NEGATIVE
NRBC # BLD: 0 K/UL (ref 0–0.01)
NRBC BLD-RTO: 0 PER 100 WBC
OPIATES UR QL: NEGATIVE
PCP UR QL: NEGATIVE
PHOSPHATE SERPL-MCNC: 3 MG/DL (ref 2.6–4.7)
PLATELET # BLD AUTO: 365 K/UL (ref 150–400)
PMV BLD AUTO: 9.6 FL (ref 8.9–12.9)
POTASSIUM SERPL-SCNC: 4 MMOL/L (ref 3.5–5.1)
PROTHROMBIN TIME: 10.7 SEC (ref 9–11.1)
RBC # BLD AUTO: 5.05 M/UL (ref 3.8–5.2)
SERVICE CMNT-IMP: ABNORMAL
SERVICE CMNT-IMP: ABNORMAL
SERVICE CMNT-IMP: NORMAL
SODIUM SERPL-SCNC: 138 MMOL/L (ref 136–145)
TRIGL SERPL-MCNC: 118 MG/DL (ref ?–150)
TROPONIN-HIGH SENSITIVITY: 171 NG/L (ref 0–51)
TSH SERPL DL<=0.05 MIU/L-ACNC: 0.72 UIU/ML (ref 0.36–3.74)
VLDLC SERPL CALC-MCNC: 23.6 MG/DL
WBC # BLD AUTO: 9.5 K/UL (ref 3.6–11)

## 2022-03-01 PROCEDURE — 97112 NEUROMUSCULAR REEDUCATION: CPT | Performed by: OCCUPATIONAL THERAPIST

## 2022-03-01 PROCEDURE — 97165 OT EVAL LOW COMPLEX 30 MIN: CPT | Performed by: OCCUPATIONAL THERAPIST

## 2022-03-01 PROCEDURE — 74011250637 HC RX REV CODE- 250/637: Performed by: FAMILY MEDICINE

## 2022-03-01 PROCEDURE — 82550 ASSAY OF CK (CPK): CPT

## 2022-03-01 PROCEDURE — 82962 GLUCOSE BLOOD TEST: CPT

## 2022-03-01 PROCEDURE — 99212 OFFICE O/P EST SF 10 MIN: CPT | Performed by: PSYCHIATRY & NEUROLOGY

## 2022-03-01 PROCEDURE — 80048 BASIC METABOLIC PNL TOTAL CA: CPT

## 2022-03-01 PROCEDURE — 97116 GAIT TRAINING THERAPY: CPT

## 2022-03-01 PROCEDURE — 84100 ASSAY OF PHOSPHORUS: CPT

## 2022-03-01 PROCEDURE — 83735 ASSAY OF MAGNESIUM: CPT

## 2022-03-01 PROCEDURE — 93306 TTE W/DOPPLER COMPLETE: CPT

## 2022-03-01 PROCEDURE — 80307 DRUG TEST PRSMV CHEM ANLYZR: CPT

## 2022-03-01 PROCEDURE — G0378 HOSPITAL OBSERVATION PER HR: HCPCS

## 2022-03-01 PROCEDURE — 85610 PROTHROMBIN TIME: CPT

## 2022-03-01 PROCEDURE — 92523 SPEECH SOUND LANG COMPREHEN: CPT

## 2022-03-01 PROCEDURE — 86141 C-REACTIVE PROTEIN HS: CPT

## 2022-03-01 PROCEDURE — 84443 ASSAY THYROID STIM HORMONE: CPT

## 2022-03-01 PROCEDURE — 84484 ASSAY OF TROPONIN QUANT: CPT

## 2022-03-01 PROCEDURE — 80061 LIPID PANEL: CPT

## 2022-03-01 PROCEDURE — 92610 EVALUATE SWALLOWING FUNCTION: CPT

## 2022-03-01 PROCEDURE — 97162 PT EVAL MOD COMPLEX 30 MIN: CPT

## 2022-03-01 PROCEDURE — 74011250636 HC RX REV CODE- 250/636: Performed by: STUDENT IN AN ORGANIZED HEALTH CARE EDUCATION/TRAINING PROGRAM

## 2022-03-01 PROCEDURE — 96372 THER/PROPH/DIAG INJ SC/IM: CPT

## 2022-03-01 PROCEDURE — 83036 HEMOGLOBIN GLYCOSYLATED A1C: CPT

## 2022-03-01 PROCEDURE — 74011250636 HC RX REV CODE- 250/636: Performed by: FAMILY MEDICINE

## 2022-03-01 PROCEDURE — 93880 EXTRACRANIAL BILAT STUDY: CPT

## 2022-03-01 PROCEDURE — 85652 RBC SED RATE AUTOMATED: CPT

## 2022-03-01 PROCEDURE — 36415 COLL VENOUS BLD VENIPUNCTURE: CPT

## 2022-03-01 PROCEDURE — 74011250637 HC RX REV CODE- 250/637: Performed by: PSYCHIATRY & NEUROLOGY

## 2022-03-01 PROCEDURE — 85027 COMPLETE CBC AUTOMATED: CPT

## 2022-03-01 PROCEDURE — 97535 SELF CARE MNGMENT TRAINING: CPT | Performed by: OCCUPATIONAL THERAPIST

## 2022-03-01 RX ORDER — CLOPIDOGREL BISULFATE 75 MG/1
75 TABLET ORAL DAILY
Status: DISCONTINUED | OUTPATIENT
Start: 2022-03-01 | End: 2022-03-04 | Stop reason: HOSPADM

## 2022-03-01 RX ORDER — HEPARIN SODIUM 5000 [USP'U]/ML
5000 INJECTION, SOLUTION INTRAVENOUS; SUBCUTANEOUS EVERY 8 HOURS
Status: DISCONTINUED | OUTPATIENT
Start: 2022-03-01 | End: 2022-03-04 | Stop reason: HOSPADM

## 2022-03-01 RX ADMIN — HEPARIN SODIUM 5000 UNITS: 5000 INJECTION INTRAVENOUS; SUBCUTANEOUS at 21:42

## 2022-03-01 RX ADMIN — ATORVASTATIN CALCIUM 80 MG: 40 TABLET, FILM COATED ORAL at 21:41

## 2022-03-01 RX ADMIN — SERTRALINE 25 MG: 50 TABLET, FILM COATED ORAL at 21:41

## 2022-03-01 RX ADMIN — ASPIRIN 81 MG 81 MG: 81 TABLET ORAL at 08:31

## 2022-03-01 RX ADMIN — HEPARIN SODIUM 5000 UNITS: 5000 INJECTION INTRAVENOUS; SUBCUTANEOUS at 15:11

## 2022-03-01 RX ADMIN — HEPARIN SODIUM 5000 UNITS: 5000 INJECTION INTRAVENOUS; SUBCUTANEOUS at 08:30

## 2022-03-01 RX ADMIN — HYDRALAZINE HYDROCHLORIDE 25 MG: 25 TABLET, FILM COATED ORAL at 16:25

## 2022-03-01 RX ADMIN — CLOPIDOGREL 75 MG: 75 TABLET, FILM COATED ORAL at 11:53

## 2022-03-01 RX ADMIN — MECLIZINE 25 MG: 12.5 TABLET ORAL at 16:25

## 2022-03-01 RX ADMIN — MECLIZINE 25 MG: 12.5 TABLET ORAL at 08:30

## 2022-03-01 RX ADMIN — HYDRALAZINE HYDROCHLORIDE 25 MG: 25 TABLET, FILM COATED ORAL at 21:42

## 2022-03-01 RX ADMIN — FAMOTIDINE 20 MG: 20 TABLET, FILM COATED ORAL at 08:30

## 2022-03-01 RX ADMIN — HYDRALAZINE HYDROCHLORIDE 25 MG: 25 TABLET, FILM COATED ORAL at 08:30

## 2022-03-01 RX ADMIN — CLONIDINE HYDROCHLORIDE 0.1 MG: 0.1 TABLET ORAL at 08:31

## 2022-03-01 RX ADMIN — METOPROLOL SUCCINATE 50 MG: 50 TABLET, EXTENDED RELEASE ORAL at 08:31

## 2022-03-01 RX ADMIN — MECLIZINE 25 MG: 12.5 TABLET ORAL at 21:42

## 2022-03-01 RX ADMIN — CLONIDINE HYDROCHLORIDE 0.1 MG: 0.1 TABLET ORAL at 17:27

## 2022-03-01 NOTE — ACP (ADVANCE CARE PLANNING)
visit for Advance Medical Directive consult in in-basket. Pt was unavailable at this time. Pt currently has and AMD on file naming Yina Luis and Sally Whitley \"Bill\". Please contact [phone] [facility] for further support.      [email or web address] Gretchen Krishnan, MACE   [phone])

## 2022-03-01 NOTE — ROUTINE PROCESS
TRANSFER - OUT REPORT:    Verbal report given to inpt RN(name) on Ed Flood  being transferred to [facility]/(unit) for routine progression of care       Report consisted of patients Situation, Background, Assessment and   Recommendations(SBAR). Information from the following report(s) SBAR was reviewed with the receiving nurse. Lines:   Peripheral IV 02/28/22 Right Antecubital (Active)        Opportunity for questions and clarification was provided.       Patient transported with:   [employer]

## 2022-03-01 NOTE — ED NOTES
Pt has become quite argumentative, pt does not want any \"new medications\" that she does not currently take at home. Pt refuses AQ checks and SQ insulin. Pt reports that she has not yet met the doctor taking care of her and is unclear why she needs new medications. Pt educated on need for Lipitor and her risk of MI and Stroke. Pt educated on elevated BGL and the need for sliding scale insulin. Pt is at fear that this will cause her to be dependant on insulin if she is started on it here. Pt informed she has a bed upstairs and should be moving to a NEURO floor shortly.

## 2022-03-01 NOTE — PROGRESS NOTES
Problem: Self Care Deficits Care Plan (Adult)  Goal: *Acute Goals and Plan of Care (Insert Text)  Description: FUNCTIONAL STATUS PRIOR TO ADMISSION: Patient was modified independent using a single point cane for functional mobility. Drives. Walks her dog. HOME SUPPORT: The patient lived alone with no local support per pt, but her friend is caring for her dog. Occupational Therapy Goals  Initiated 3/1/2022   1. Patient will perform grooming standing at sink with contact guard assist within 7 day(s). 2.  Patient will perform lower body dressing with contact guard assist within 7 day(s). 3.  Patient will perform toilet transfers with contact guard assist within 7 day(s). 4.  Patient will perform all aspects of toileting with contact guard assist within 7 day(s). 5.  Patient will participate in upper extremity therapeutic exercise/activities with modified independence within 7 day(s). 6.  Patient will utilize energy conservation techniques during functional activities with verbal and visual cues within 7 day(s). 8.  Patient will improve their LUE Fugl Munoz score by 2 points in prep for ADLs within 7 days. Outcome: Progressing Towards Goal     OCCUPATIONAL THERAPY EVALUATION  Patient: Jared Christianson (89 y.o. female)  Date: 3/1/2022  Primary Diagnosis: Dizziness [R42]        Precautions: Fall,Bed Alarm    ASSESSMENT  Based on the objective data described below, the patient presents with increased anxiety, perseveration on dizzy, tangential conversations, L side mild weakness, ataxia, decreased endurance, mobility, balance and safety following admission for CVA. MRI positive for acute infarct in the left middle cerebellar peduncle. 2. Unchanged generalized parenchymal volume loss and moderate chronic microvascular ischemic disease. Unchanged chronic infarcts in the bilateral thalami, left occipital lobe, and right cerebellum.  Unchanged numerous supratentorial and  infratentorial chronic microhemorrhages, predominantly within the thalami, secondary to chronic microvascular disease. Pt required increased time, concrete instructions, re-direction to task and mod-min A for LE ADLs, toileting and functional mobility. Pt lives alone and based on above is not safe to return home alone at this time. Recommend IP rehab at discharge. If pt denied then ill require SNF. Current Level of Function Impacting Discharge (ADLs/self-care): increased time, concrete instructions, re-direction to task and mod-min A for LE ADLs, toileting and functional mobility amb with RW    Functional Outcome Measure: The patient scored Total A-D  Total A-D (Motor Function): 64/66 on the Fugl-Munoz Assessment which is indicative of mild impairment in upper extremity functional status. Other factors to consider for discharge: see above     Patient will benefit from skilled therapy intervention to address the above noted impairments. PLAN :  Recommendations and Planned Interventions: self care training, functional mobility training, therapeutic exercise, balance training, therapeutic activities, cognitive retraining, endurance activities, neuromuscular re-education, patient education, home safety training, and family training/education    Frequency/Duration: Patient will be followed by occupational therapy 5 times a week to address goals. Recommendation for discharge: (in order for the patient to meet his/her long term goals)  Therapy 3 hours per day 5-7 days per week    This discharge recommendation:  Has been made in collaboration with the attending provider and/or case management    IF patient discharges home will need the following DME: TBD       SUBJECTIVE:   Patient stated I am so dizzy. I can't go home.     OBJECTIVE DATA SUMMARY:   HISTORY:   Past Medical History:   Diagnosis Date    Arthritis     Chronic pain     Diabetes ([address]     GERD (gastroesophageal reflux disease)     Hypertension     Ill-defined condition     ucerative colitis    Ulcerative colitis [facility] [address]     Vertigo      Past Surgical History:   Procedure Laterality Date    HX GI      colonoscopy    HX ORTHOPAEDIC Right 2014    FOOT    HX OTHER SURGICAL      finger surgery, foot surgery     Expanded or extensive additional review of patient history:     Home Situation  Home Environment: Private residence  # Steps to Enter: 8  Rails to Enter: Yes  Hand Rails : Bilateral  One/Two Story Residence: Two story, live on 1st floor  Living Alone: Yes  Support Systems: No Support Systems  Current DME Used/Available at Home: Cane, straight,Walker, rolling,Shower chair  Tub or Shower Type: Tub/Shower combination    Hand dominance: Right    EXAMINATION OF PERFORMANCE DEFICITS:  Cognitive/Behavioral Status:  Neurologic State: Alert  Orientation Level: Oriented X4  Cognition: Decreased attention/concentration; Follows commands;Poor safety awareness  Perception: Appears intact  Perseveration: Perseverates during conversation;Verbal cues provided (on being dizzy)  Safety/Judgement: Awareness of environment;Decreased awareness of need for safety; Fall prevention    Hearing: Auditory  Auditory Impairment: Hard of hearing, bilateral (L > R)    Vision/Perceptual:    Tracking: Requires cues, head turns, or add eye shifts to track; Unable to hold eye position out of midline    Saccades: Additional eye shifts occurred during testing    Convergence: Unable to test secondary due to decreased visual attention    Visual Fields: Unable to test secondary due to decreased visual attention  Diplopia: No    Acuity: Able to read clock/calendar on wall without difficulty; Able to read employee name badge without difficulty (states R eye vision imp, ptosis, medical deviation noted)    Corrective Lenses: Glasses    Range of Motion:  AROM: Generally decreased, functional  PROM: Generally decreased, functional                      Strength:  Strength: Generally decreased, functional (LUE 3+/5; LLE dosiflexion 4/5, hip/knee NT arthritis)                Coordination:  Coordination: Generally decreased, functional  Fine Motor Skills-Upper: Left Impaired;Right Impaired    Gross Motor Skills-Upper: Left Impaired;Right Intact    Tone & Sensation:  Tone: Normal  Sensation: Impaired (B hands and feet- neuropathy)                      Balance:  Sitting: Intact  Standing: Impaired; With support  Standing - Static: Fair  Standing - Dynamic : Fair    Functional Mobility and Transfers for ADLs:  Bed Mobility:  Supine to Sit: Supervision  Sit to Supine: Supervision  Scooting: Stand-by assistance    Transfers:  Sit to Stand: Minimum assistance; Additional time;Assist x1  Stand to Sit: Minimum assistance; Additional time;Assist x1  Bed to Chair: Minimum assistance; Additional time;Assist x1 (RW, small steps, increased axiety, imp balance, ataxia)  Bathroom Mobility: Minimum assistance  Toilet Transfer : Minimum assistance  Assistive Device : Gait Belt;Walker, rolling    ADL Assessment and Intervention:  Feeding: Setup; Additional time (mild ataxia)    Oral Facial Hygiene/Grooming: Minimum assistance; Additional time;Assist x1 (standing at sink x2 min- dizzy, ataxia)    Bathing: Moderate assistance; Additional time;Assist x1 (seated- A to reach L foot, balance, cleanliness)    Upper Body Dressing: Minimum assistance; Additional time (to manage fasteners)    Lower Body Dressing: Moderate assistance; Additional time;Assist x1 (seated- A to reach L foot, balance, ataxia)    Toileting: Moderate assistance; Additional time;Assist x1 (A for clothing)    Patient instructed and indicated understanding energy conservation techniques to increase independence and safety during ADLs. Cognitive Retraining  Safety/Judgement: Awareness of environment;Decreased awareness of need for safety; Fall prevention      Functional Measure:  Fugl-Munoz Assessment of Motor Recovery after Stroke: LUE  Reflex Activity  Flexors/Biceps/Fingers: Can be elicited  Extensors/Triceps: Can be elicited  Reflex Subtotal: 4    Volitional Movement Within Synergies  Shoulder Retraction: Full  Shoulder Elevation: Full  Shoulder Abduction (90 degrees): Full  Shoulder External Rotation: Full  Elbow Flexion: Full  Forearm Supination: Full  Shoulder Adduction/Internal Rotation: Full  Elbow Extension: Full  Forearm Pronation: Full  Subtotal: 18    Volitional Movement Mixing Synergies  Hand to Lumbar Spine: Full  Shoulder Flexion (0-90 degrees): Full  Pronation-Supination: Full  Subtotal: 6    Volitional Movement With Little or No Synergy  Shoulder Abduction (0-90 degrees): Full  Shoulder Flexion ( degrees): Full  Pronation/Supination: Full  Subtotal : 6    Normal Reflex Activity  Biceps, Triceps, Finger Flexors: Full  Subtotal : 2    Upper Extremity Total   Upper Extremity Total: 36    Wrist  Stability at 15 Degree Dorsiflexion: Full  Repeated Dorsiflexion/ Volar Flexion: Full  Stability at 15 Degree Dorsiflexion: Full  Repeated Dorsiflexion/ Volar Flexion: Full  Circumduction: Full  Wrist Total: 10    Hand  Mass Flexion: Full  Mass Extension: Full  Grasp A: Full  Grasp B: Full  Grasp C: Full  Grasp D: Full  Grasp E: Full  Hand Total: 14    Coordination/Speed  Tremor: Slight  Dysmetria: Slight  Time: <1s (RUE 11 sec and LUE 12 sec)  Coordination/Speed Total : 4    Total A-D  Total A-D (Motor Function): 64/66     This is a reliable/valid measure of arm function after a neurological event. It has established value to characterize functional status and for measuring spontaneous and therapy-induced recovery; tests proximal and distal motor functions. Fugl-Munoz Assessment  UE scores recorded between five and 30 days post neurologic event can be used to predict UE recovery at six months post neurologic event.   Severe = 0-21 points   Moderately Severe = 22-33 points   Moderate = 34-47 points   Mild = 48-66 points  Ángela Mitchell LARISA, ARTIS Espinal, JAGDEEP Patrick, & ALBERT Calles (1992). Measurement of motor recovery after stroke: Outcome assessment and sample size requirements. Stroke, 23, pp. 7607-6786.   ------------------------------------------------------------------------------------------------------------------------------------------------------------------  MCID:  Stroke:   Dianne Brown et al, 2001; n = 171; mean age 79 (6) years; assessed within 16 (12) days of stroke, Acute Stroke)  FMA Motor Scores from Admission to Discharge   10 point increase in FMA Upper Extremity = 1.5 change in discharge FIM   10 point increase in FMA Lower Extremity = 1.9 change in discharge FIM  MDC:   Stroke:   Juli Connors et al, 2008, n = 14, mean age = 59.9 (14.6) years, assessed on average 14 (6.5) months post stroke, Chronic Stroke)   FMA = 5.2 points for the Upper Extremity portion of the assessment       Barthel Index:  Bathin  Bladder: 5  Bowels: 10  Groomin  Dressin  Feedin  Mobility: 0  Stairs: 0  Toilet Use: 5  Transfer (Bed to Chair and Back): 10  Total: 40/100      The Barthel ADL Index: Guidelines  1. The index should be used as a record of what a patient does, not as a record of what a patient could do. 2. The main aim is to establish degree of independence from any help, physical or verbal, however minor and for whatever reason. 3. The need for supervision renders the patient not independent. 4. A patient's performance should be established using the best available evidence. Asking the patient, friends/relatives and nurses are the usual sources, but direct observation and common sense are also important. However direct testing is not needed. 5. Usually the patient's performance over the preceding 24-48 hours is important, but occasionally longer periods will be relevant. 6. Middle categories imply that the patient supplies over 50 per cent of the effort. 7. Use of aids to be independent is allowed.     Score Interpretation (from Sinoff 1997)    Independent   60-79 Minimally independent   40-59 Partially dependent   20-39 Very dependent   <20 Totally dependent     -Juan Carrillo., Barthel, D.W. (1965). Functional evaluation: the Barthel Index. [address] ([address] (). The Barthel activities of daily living index: self-reporting versus actual performance in the old (> or = 75 years). Journal [address] 45(7), 14 [facility], J.J.M.F, Lisandra Stable., Romana Just. (1999). Measuring the change in disability after inpatient rehabilitation; comparison of the responsiveness of the Barthel Index and Functional [place] Measure. Journal of Neurology, Neurosurgery, and Psychiatry, 66(4), 906-933. YASMANY Sierra, EMANUEL Kapadia, & Luz Gutierrez, MTaniA. (2004) Assessment of post-stroke quality of life in cost-effectiveness studies: The usefulness of the Barthel Index and the EuroQoL-5D. Quality of Life Research, 15, 618-91      Occupational Therapy Evaluation Charge Determination   History Examination Decision-Making   LOW Complexity : Brief history review  HIGH Complexity : 5 or more performance deficits relating to physical, cognitive , or psychosocial skils that result in activity limitations and / or participation restrictions HIGH Complexity : Patient presents with comorbidities that affect occupational performance.  Signifigant modification of tasks or assistance (eg, physical or verbal) with assessment (s) is necessary to enable patient to complete evaluation       Based on the above components, the patient evaluation is determined to be of the following complexity level: LOW   Pain Ratin/10    Activity Tolerance:   Fair and requires frequent rest breaks    After treatment patient left in no apparent distress:    Sitting in chair, Call bell within reach, and Bed / chair alarm activated    COMMUNICATION/EDUCATION:   The patients plan of care was discussed with: Physical therapist, Registered nurse, and Case management. Patient was educated regarding her deficit(s) of increased anxiety, perseveration on dizzy, tangential conversations, L side mild weakness, ataxia, decreased endurance, mobility, balance and safety as this relates to her diagnosis of MRI positive for acute infarct in the left middle cerebellar peduncle. 2. Unchanged generalized parenchymal volume loss and moderate chronic microvascular ischemic disease. Unchanged chronic infarcts in the bilateral thalami, left occipital lobe, and right cerebellum. Unchanged numerous supratentorial and  infratentorial chronic microhemorrhages, predominantly within the thalami, secondary to chronic microvascular disease. She demonstrated Fair understanding as evidenced by some awareness of situation. Patient and/or family was verbally educated on the BE FAST acronym for signs/symptoms of CVA and TIA. BE FAST was written on patient's communication board  for visual education and reinforcement. All questions answered with patient indicating fair understanding. Home safety education was provided and the patient/caregiver indicated understanding., Patient/family have participated as able in goal setting and plan of care. , and Patient/family agree to work toward stated goals and plan of care. This patients plan of care is appropriate for delegation to [facility].     Thank you for this referral.  Amrik Alcala, OT  Time Calculation: 39 mins

## 2022-03-01 NOTE — ED NOTES
Bedside shift change report given to Celi Lagos (oncoming nurse) by Rika Armstrong (offgoing nurse). Report included the following information SBAR, ED Summary, Intake/Output, MAR, Recent Results and Med Rec Status.

## 2022-03-01 NOTE — PROGRESS NOTES
[facility] Adult  Hospitalist Group                                                                                          Hospitalist Progress Note  Rasta KeltonDO pepe  Answering service: [phone] from in house phone        Date of Service:  3/1/2022  NAME:  Destiny Cadet  :  1950  MRN:  [id number]      Admission Summary:   Patient presented to the ER with dizziness, reports her symptoms started 3 days back, reports that she feels dizzy and lightheaded, feels as if she was in a fall, came to the ER today, deemed as a code stroke and was requested to be admitted to the hospital service, patient reports that she has had off-and-on dizziness in the past but is not able to elaborate the same, patient denies any other complaints or problem     The patient denies any headache, blurry vision, sore throat, trouble swallowing, trouble with speech, chest pain, SOB, cough, fever, chills, N/V/D, abd pain, urinary symptoms, constipation, recent travels, sick contacts, , falls, injuries, rashes, contact with COVID-19 diagnosed patients, hematemesis, melena, hemoptysis, hematuria, rashes, denies starting any new medications and denies any other concerns or problems besides as mentioned above.            Interval history / Subjective:   Patient was seen and examined this morning. Patient denies any new complaints. Patient denies fever, chills, shortness of breath, chest pain, abdominal pain, nausea, vomiting, and diarrhea. No overnight events reported by nursing staff. Patient reports continued dizziness.          Assessment & Plan:     -Acute infarct in the left middle cerebellar peduncle  --Antiplatelet therapy  -Statin therapy  -Echocardiogram  -Carotid ultrasound  --PT/OT  --Neurology following, appreciate recommendations    --Dizziness, will continue with meclizine    Accelerated hypertension-improved  --Toprol 50 mg daily, hydralazine 25 mg 3 times daily, clonidine 0.1 mg twice daily  --We will continue to monitor closely    ALEJANDRO on CKD  --Gentle fluid hydration with NS      Type 2 diabetes  --Sliding scale Humalog            Code status:  Full code  Prophylaxis: Heparin  Care Plan discussed with: Patient/RN/CM  Anticipated Disposition: 1 to 2 days     Hospital Problems  Date Reviewed: 11/18/2020          Codes Class Noted POA    Dizziness ICD-10-CM: R42  ICD-9-CM: 780.4  2/28/2022 Unknown                Review of Systems:   A comprehensive review of systems was negative except for that written in the HPI. Vital Signs:    Last 24hrs VS reviewed since prior progress note. Most recent are:  Visit Vitals  /75 (BP 1 Location: Left upper arm)   Pulse 82   Temp 99 °F (37.2 °C)   Resp 17   Ht 5' 5\" (1.651 m)   Wt 75.8 kg (167 lb)   SpO2 95%   BMI 27.79 kg/m²       No intake or output data in the 24 hours ending 03/01/22 1551     Physical Examination:     I had a face to face encounter with this patient and independently examined them on 3/1/2022 as outlined below:          Constitutional:  No acute distress, cooperative, pleasant    ENT:  Oral mucosa moist, oropharynx benign. Resp:  CTA bilaterally. No wheezing/rhonchi/rales. No accessory muscle use. CV:  Regular rhythm, normal rate, no murmurs, gallops, rubs    GI:  Soft, non distended, non tender. normoactive bowel sounds, no hepatosplenomegaly     Musculoskeletal:  No edema, warm, 2+ pulses throughout    Neurologic:  Moves all extremities.   AAOx3, CN II-XII reviewed            Data Review:    Review and/or order of clinical lab test      Labs:     Recent Labs     03/01/22  0037 02/28/22  1227   WBC 9.5 8.9   HGB 15.0 16.7*   HCT 45.6 51.3*    377     Recent Labs     03/01/22  0037 02/28/22  1929 02/28/22  1227    136 139   K 4.0 3.9 3.9   * 109* 110*   CO2 22 22 24   BUN 43* 35* 36*   CREA 1.92* 1.99* 1.93*   * 225* 131*   CA 9.7 10.0 10.2*   MG 2.2  --   --    PHOS 3.0  --   --      Recent Labs 02/28/22 1929 02/28/22  1227   ALT 23 22   * 136*   TBILI 0.6 0.7   TP 8.2 8.4*   ALB 4.0 4.2   GLOB 4.2* 4.2*     Recent Labs     03/01/22  0037 02/28/22  1227   INR 1.0 1.0   PTP 10.7 10.7   APTT  --  30.2      No results for input(s): FE, TIBC, PSAT, FERR in the last 72 hours. No results found for: FOL, RBCF   No results for input(s): PH, PCO2, PO2 in the last 72 hours.   Recent Labs     03/01/22  0039 02/28/22 1929    127     Lab Results   Component Value Date/Time    Cholesterol, total 194 03/01/2022 12:37 AM    HDL Cholesterol 53 03/01/2022 12:37 AM    LDL, calculated 117.4 (H) 03/01/2022 12:37 AM    Triglyceride 118 03/01/2022 12:37 AM    CHOL/HDL Ratio 3.7 03/01/2022 12:37 AM     Lab Results   Component Value Date/Time    Glucose (POC) 127 (H) 03/01/2022 07:15 AM    Glucose (POC) 101 02/28/2022 02:07 PM    Glucose (POC) 246 (H) 06/22/2017 11:10 AM    Glucose (POC) 157 (H) 06/22/2017 06:13 AM    Glucose (POC) 244 (H) 06/21/2017 09:42 PM     No results found for: COLOR, APPRN, SPGRU, REFSG, BEATA, PROTU, GLUCU, KETU, BILU, UROU, SNEHAL, LEUKU, GLUKE, EPSU, BACTU, WBCU, RBCU, CASTS, UCRY      Medications Reviewed:     Current Facility-Administered Medications   Medication Dose Route Frequency    heparin (porcine) injection 5,000 Units  5,000 Units SubCUTAneous Q8H    clopidogreL (PLAVIX) tablet 75 mg  75 mg Oral DAILY    cloNIDine HCL (CATAPRES) tablet 0.1 mg  0.1 mg Oral BID    [Held by provider] glipiZIDE (GLUCOTROL) tablet 20 mg  20 mg Oral DAILY    hydrALAZINE (APRESOLINE) tablet 25 mg  25 mg Oral TID    [Held by provider] metFORMIN (GLUCOPHAGE) tablet 1,000 mg  1,000 mg Oral BID WITH MEALS    metoprolol succinate (TOPROL-XL) XL tablet 50 mg  50 mg Oral DAILY    famotidine (PEPCID) tablet 20 mg  20 mg Oral DAILY    sertraline (ZOLOFT) tablet 25 mg  25 mg Oral QHS    acetaminophen (TYLENOL) tablet 650 mg  650 mg Oral Q4H PRN    Or    acetaminophen (TYLENOL) solution 650 mg  650 mg Per NG tube Q4H PRN    Or    acetaminophen (TYLENOL) suppository 650 mg  650 mg Rectal Q4H PRN    0.9% sodium chloride infusion  75 mL/hr IntraVENous CONTINUOUS    atorvastatin (LIPITOR) tablet 80 mg  80 mg Oral QHS    hydrALAZINE (APRESOLINE) 20 mg/mL injection 10 mg  10 mg IntraVENous Q6H PRN    meclizine (ANTIVERT) tablet 25 mg  25 mg Oral TID    insulin lispro (HUMALOG) injection   SubCUTAneous AC&HS    glucose chewable tablet 16 g  4 Tablet Oral PRN    glucagon (GLUCAGEN) injection 1 mg  1 mg IntraMUSCular PRN    dextrose 10 % infusion 0-250 mL  0-250 mL IntraVENous PRN     ______________________________________________________________________  EXPECTED LENGTH OF STAY: - - -  ACTUAL LENGTH OF STAY:          0                 Karlee Taveras DO

## 2022-03-01 NOTE — PROGRESS NOTES
Transition of Care Plan: Home pending medical/therapy recs    RUR: N/A    PCP F/U: Vidal Romero MD    Disposition: Home pending medical/therapy recs    Transportation: TBD-family    Main Contact: son Bessy Pina (primary [phone]) and Hannah Jiang (ex-spouse [phone])    [phone]: CM continues to follow for medical/therapy recs    Flaquita Guillen RN, CRM

## 2022-03-01 NOTE — ED NOTES
Pt aggitated r/t not being placed upstairs yet. Informed pt we are still pending her MRI. She is next inline for scan. Pt jairo anxious. Pt given her night Zoloft early. Pt placed in hospital gown and gripper socks. Pt able to stand pivot to bedside commode.

## 2022-03-01 NOTE — PROGRESS NOTES
Problem: Motor Speech Impaired (Adult)  Goal: *Acute Goals and Plan of Care (Insert Text)  Description: Speech Pathology Goals  Initiated 3/1/2022  1. Patient will participate in further evaluation of motor speech function if symptoms persist or worsen within 7 days. Outcome: Progressing Towards Goal     SPEECH LANGUAGE PATHOLOGY BEDSIDE SWALLOW AND SPEECH EVALUATIONS  Patient: Bahman Holloway (46 y.o. female)  Date: 3/1/2022  Primary Diagnosis: Dizziness [R42]        Precautions:        ASSESSMENT :  Patient admitted for dizziness, reporting she had a fall on Saturday. 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 Tolerating regular diet. Based on the objective data described below, the patient presents with functional oropharyngeal swallow with no difficulties or s/s of aspiration appreciated at bedside with any consistencies. Patient reports that she occasionally has difficulty with dry foods like bread and needs sips of water to help wash it down. Patient benefited from small sips and bites, and alternating liquids and solids. Given bedside presentation, recommend regular diet/thin liquids with precautions as outlined below. Suspect pt is at baseline swallow function and therefore, skilled acute therapy provided by a speech-language pathologist is not indicated at this time. SLP will sign off. Please reconsult with any changes or if SLP can be of any further assistance. Patient also presents with mild dysarthria characterized by decreased rate and minimally decreased articulatory precision, however this does not impact functional intelligibility. It does impact patient's efficiency and comfort for communication. Patient reports lower left facial numbness that began on Saturday, and believes this is impacting her speech. Patient benefited from cues to speak slow, loud and over-articulate. SLP will follow to monitor motor speech and for reinforcement of strategies.      Patient will benefit from skilled intervention to address the above impairments. Patients rehabilitation potential is considered to be Fair     PLAN :  Recommendations and Planned Interventions:  -- regular diet/ thin liquids  -- general aspiration precautions including completely upright for all PO   -- small bites and sips  -- alterate liquids and solids  -- SLP will sign off for swallowing and follow for motor speech    Frequency/Duration: Patient will be followed by speech-language pathology 2 times a week to address goals. Discharge Recommendations: To Be Determined     SUBJECTIVE:   Patient stated I'm sorry, I'm just very tired and still so dizzy. OBJECTIVE:     Past Medical History:   Diagnosis Date    Arthritis     Chronic pain     Diabetes ([facility] [address]     GERD (gastroesophageal reflux disease)     Hypertension     Ill-defined condition     ucerative colitis    Ulcerative colitis ([facility] [address]     Vertigo      Past Surgical History:   Procedure Laterality Date    HX GI      colonoscopy    HX ORTHOPAEDIC Right 2014    FOOT    HX OTHER SURGICAL      finger surgery, foot surgery     Prior Level of Function/Home Situation:      Diet prior to admission: regular/thin  Current Diet:  regular/thin   Cognitive and Communication Status:  Neurologic State: Alert  Orientation Level: Oriented X4  Cognition: Follows commands  Perception: Appears intact  Perseveration: Perseverates during conversation (on being dizzy)  Safety/Judgement: Awareness of environment  Swallowing Evaluation:   Oral Assessment:  Oral Assessment  Labial: Other (comment) (Lower left facial numbness)  Dentition: Intact; Natural  Oral Hygiene: moist oral mucosa free of secretions  Lingual: Decreased rate  Velum: No impairment  Mandible: No impairment  P.O. Trials:  Patient Position: upright on edge of bed  Vocal quality prior to P.O.: No impairment  Consistency Presented: Solid; Thin liquid;Puree  How Presented: Self-fed/presented     Bolus Acceptance: No impairment  Bolus Formation/Control: No impairment     Propulsion: No impairment  Oral Residue: Other (comment) (benefitted from liquid wash)  Initiation of Swallow: No impairment  Laryngeal Elevation: Functional  Aspiration Signs/Symptoms: None  Pharyngeal Phase Characteristics: No impairment, issues, or problems   Effective Modifications: Alternate liquids/solids;Small sips and bites  Cues for Modifications: None       Oral Phase Severity: Mild  Pharyngeal Phase Severity : No impairment    NOMS:   The NOMS functional outcome measure was used to quantify this patient's level of swallowing impairment. Based on the NOMS, the patient was determined to be at level 6 for swallow function       NOMS Swallowing Levels:  Level 1 (CN): NPO  Level 2 (CM): NPO but takes consistency in therapy  Level 3 (CL): Takes less than 50% of nutrition p.o. and continues with nonoral feedings; and/or safe with mod cues; and/or max diet restriction  Level 4 (CK): Safe swallow but needs mod cues; and/or mod diet restriction; and/or still requires some nonoral feeding/supplements  Level 5 (CJ): Safe swallow with min diet restriction; and/or needs min cues  Level 6 (CI): Independent with p.o.; rare cues; usually self cues; may need to avoid some foods or needs extra time  Level 7 ([address] [place] [address]): Independent for all p.o.  ASHWINI. (2003). National Outcomes Measurement System (NOMS): Adult Speech-Language Pathology User's Guide. Speech/Language Evaluation  Motor Speech:  Oral-Motor Structure/Motor Speech  Face: Lower facial weakness; Other (comment) (patint reports this has been on-going since saturday)  Labial: Other (comment) (Lower left facial numbness)  Dentition: Intact; Natural  Oral Hygiene: moist oral mucosa free of secretions  Lingual: Decreased rate  Velum: No impairment  Mandible: No impairment  Apraxic Characteristics: None  Dysarthric Characteristics: Decreased rate; Imprecise  Intelligibility: No impairment  Intonation: appropriate  Rate: reduced  Prosody: appropriate  Overall Impairment Severity: Minimal  Compensatory Strategies for Motor Speech:  (slow, loud, over-articulate, breathe)          Voice:                 Vocal Quality: No impairment                                 NOMS: The NOMS functional outcome measure was used to quantify this patient's level of motor speech impairment. Based on the NOMS, the patient was determined to be at level 6 for motor speech function. NOMS Motor Speech:  Level 1 (CN):  100% unintelligible  Level 2 (CM):  Communication partner responsible for message; can do CV or automatic words w/ max cues but rarely intelligible in context  Level 3 (CL): communication partner primarily responsible for message but says CV/automatic words intelligibly; mod cues to say simple words/phrases  Level 4 (CK): In structured conversation with familiar listener can say simple words and phrases. Mod cues for simple sentences  Level 5 (CJ):  Uses simple sentences for ADLs with familiar and unfamiliar listener; min cues for complex sentences  Level 6 (CI):  Intelligible in ADLs; difficulty in voc/social activites; rare cues for complex message; uses comp strategies  Level 7 ([address] [place] [address]):  Intelligible in all activities. May occasionally use compensatory strategies. ASHWINI. (2003). National Outcomes Measurement System (NOMS): Adult Speech-Language Pathology User's Guide. After treatment:   Patient left in no apparent distress in bed, Call bell within reach, and Nursing notified    COMMUNICATION/EDUCATION:     The patient's plan of care including recommendations, planned interventions, and recommended diet changes were discussed with: Registered nurse. Patient/family have participated as able in goal setting and plan of care. Patient/family agree to work toward stated goals and plan of care.     Thank you for this referral.  Aguilar Garcia M.S. CF-SLP   Speech Language Pathologist     Time Calculation: 10 mins

## 2022-03-01 NOTE — PROGRESS NOTES
PHYSICAL THERAPY EVALUATION  Patient: Yusra Johnson (13 y.o. female)  Date: 3/1/2022  Primary Diagnosis: Dizziness [R42]        Precautions:   Fall,Bed Alarm    ASSESSMENT  Based on the objective data described below, the patient presents with impaired left UE strength, coordination, balance, impaired safety awareness, short term memory loss, and c/o dizziness with all activity. Noted slight left UE weakness but LE assessment limited by knee DJD and hip weakness following a REHANA. Patient perseverative on being dizziness, which persisted but did not worsen with activity. Gait training completed x12 and 24' respectively' using a RW requiring minimal assistance. She required heavy cuing for sequencing and to increase right LE step length. Returned to a bedside recliner with a poorly controlled descent post session and left with all needs met. The patient typically lives alone and has very limited local support. The patient is unsafe to return home both for cognitive and mobility deficits. Highly recommend discharge to IP rehab when medically stable. Current Level of Function Impacting Discharge (mobility/balance): minimal assist for gait, heavy cues for sequencing, impaired safety awareness       Patient will benefit from skilled therapy intervention to address the above noted impairments. PLAN :  Recommendations and Planned Interventions: bed mobility training, transfer training, gait training, therapeutic exercises, neuromuscular re-education and therapeutic activities      Frequency/Duration: Patient will be followed by physical therapy:  4 times a week to address goals.     Recommendation for discharge: (in order for the patient to meet his/her long term goals)  Therapy 3 hours per day 5-7 days per week    This discharge recommendation:  Has been made in collaboration with the attending provider and/or case management    IF patient discharges home will need the following DME: to be determined (TBD) SUBJECTIVE:   Patient stated I would have fallen in the floor if you weren't holding me!  \"Can you leave the walker close if I want to walk? \"    OBJECTIVE DATA SUMMARY:   HISTORY:    Past Medical History:   Diagnosis Date    Arthritis     Chronic pain     Diabetes ([facility] [address]     GERD (gastroesophageal reflux disease)     Hypertension     Ill-defined condition     ucerative colitis    Ulcerative colitis ([facility] [address]     Vertigo      Past Surgical History:   Procedure Laterality Date    HX GI      colonoscopy    HX ORTHOPAEDIC Right 2014    FOOT    HX OTHER SURGICAL      finger surgery, foot surgery       Personal factors and/or comorbidities impacting plan of care: local support from a friend only who reports being unable to meet the patient's needs    Home Situation  Home Environment: Private residence  # Steps to Enter: 8  Rails to Enter: Yes  Hand Rails : Bilateral  One/Two Story Residence: Two story, live on 1st floor  Living Alone: Yes  Support Systems: No Support Systems  Current DME Used/Available at Home: [address] [facility] [address], straight,Walker, rolling,Shower chair  Tub or Shower Type: Tub/Shower combination    EXAMINATION/PRESENTATION/DECISION MAKING:   Critical Behavior:  Neurologic State: Alert  Orientation Level: Oriented X4  Cognition: Decreased attention/concentration,Follows commands,Poor safety awareness  Safety/Judgement: Awareness of environment,Decreased awareness of need for safety,Fall prevention  Hearing:   Auditory  Auditory Impairment: Hard of hearing, bilateral (L > R)  Skin:    Edema:   Range Of Motion:  AROM: Generally decreased, functional           PROM: Generally decreased, functional           Strength:    Strength: Generally decreased, functional (LUE 3+/5; LLE dosiflexion 4/5, hip/knee NT arthritis)                    Tone & Sensation:   Tone: Normal              Sensation: Impaired (B hands and feet- neuropathy)               Coordination:  Coordination: Generally decreased, functional  Vision:   Tracking: Requires cues, head turns, or add eye shifts to track; Unable to hold eye position out of midline  Saccades: Additional eye shifts occurred during testing  Convergence: Unable to test secondary due to decreased visual attention  Visual Fields: Unable to test secondary due to decreased visual attention  Diplopia: No  Acuity: Able to read clock/calendar on wall without difficulty; Able to read employee name badge without difficulty (states R eye vision imp, ptosis, medical deviation noted)  Corrective Lenses: Glasses  Functional Mobility:  Bed Mobility:     Supine to Sit: Supervision  Sit to Supine: Supervision  Scooting: Stand-by assistance  Transfers:  Sit to Stand: Minimum assistance; Additional time;Assist x1  Stand to Sit: Minimum assistance; Additional time;Assist x1        Bed to Chair: Minimum assistance; Additional time;Assist x1 (RW, small steps, increased axiety, imp balance, ataxia)              Balance:   Sitting: Intact  Standing: Impaired; With support  Standing - Static: Fair  Standing - Dynamic : Fair  Ambulation/Gait Training:  Distance (ft): 36 Feet (ft) (12, seated rest and 24)  Assistive Device: Gait belt;Walker, rolling  Ambulation - Level of Assistance: Minimal assistance     Gait Description (WDL): Exceptions to WDL  Gait Abnormalities: Decreased step clearance; Ataxic;Trunk sway increased; Path deviations        Base of Support: Widened  Stance: Right decreased  Speed/Zehra: Shuffled;Pace decreased (<100 feet/min)  Step Length: Right shortened  Swing Pattern: Right asymmetrical       Functional Measure:  Plunkett Balance Test:    Sitting to Standin  Standing Unsupported: 0  Sitting with Back Unsupported: 3  Standing to Sittin  Transfers: 1  Standing Unsupported with Eyes Closed: 0  Standing Unsupported with Feet Together: 0  Reach Forward with Outstretched Arm: 0   Object: 0  Turn to Look Over Shoulders: 0  Turn 360 Degrees: 0  Alternate Foot on Step/Stool: 0  Standing Unsupported One Foot in Front: 0  Stand on One Le  Total: 6/56         56=Maximum possible score;   0-20=High fall risk  21-40=Moderate fall risk   41-56=Low fall risk                  Physical Therapy Evaluation Charge Determination   History Examination Presentation Decision-Making   MEDIUM  Complexity : 1-2 comorbidities / personal factors will impact the outcome/ POC  MEDIUM Complexity : 3 Standardized tests and measures addressing body structure, function, activity limitation and / or participation in recreation  MEDIUM Complexity : Evolving with changing characteristics  MEDIUM Complexity : FOTO score of 26-74      Based on the above components, the patient evaluation is determined to be of the following complexity level: MEDIUM    Pain Rating:      Activity Tolerance:   Fair    After treatment patient left in no apparent distress:   Sitting in chair, Call bell within reach and Bed / chair alarm activated    COMMUNICATION/EDUCATION:   The patients plan of care was discussed with: Registered nurse. Patient educated in her deficits of dizziness as it relate to a cerebellar CVA. She verbalized understanding of the education provided. Fall prevention education was provided and the patient/caregiver indicated understanding., Patient/family have participated as able in goal setting and plan of care. and Patient/family agree to work toward stated goals and plan of care.     Thank you for this referral.  Iona Kowalski, PT, DPT   Time Calculation: 38 mins

## 2022-03-01 NOTE — PROGRESS NOTES
Neurology Progress Note    Patient ID:  Jahaira Garcia  [id number]  80 y.o.  1950    Subjective:      Subjectively patient feels about the same. She has many questions about how she is going to function in her life discussed how she is not given her body any time to recover and will need to go to rehab before answering many of her questions. She also appears to suggest that she can call her creditors to let them know she is in the hospital that she needs nursing to do it because she is too dizzy. She also states that she cannot call her doctors to cancel her appointments because she does not know the number.   Has many questions otherwise about how she is going to take care of her dog worried about her dog missing her etc.    Current Facility-Administered Medications   Medication Dose Route Frequency    heparin (porcine) injection 5,000 Units  5,000 Units SubCUTAneous Q8H    clopidogreL (PLAVIX) tablet 75 mg  75 mg Oral DAILY    cloNIDine HCL (CATAPRES) tablet 0.1 mg  0.1 mg Oral BID    [Held by provider] glipiZIDE (GLUCOTROL) tablet 20 mg  20 mg Oral DAILY    hydrALAZINE (APRESOLINE) tablet 25 mg  25 mg Oral TID    [Held by provider] metFORMIN (GLUCOPHAGE) tablet 1,000 mg  1,000 mg Oral BID WITH MEALS    metoprolol succinate (TOPROL-XL) XL tablet 50 mg  50 mg Oral DAILY    famotidine (PEPCID) tablet 20 mg  20 mg Oral DAILY    sertraline (ZOLOFT) tablet 25 mg  25 mg Oral QHS    acetaminophen (TYLENOL) tablet 650 mg  650 mg Oral Q4H PRN    Or    acetaminophen (TYLENOL) solution 650 mg  650 mg Per NG tube Q4H PRN    Or    acetaminophen (TYLENOL) suppository 650 mg  650 mg Rectal Q4H PRN    0.9% sodium chloride infusion  75 mL/hr IntraVENous CONTINUOUS    atorvastatin (LIPITOR) tablet 80 mg  80 mg Oral QHS    hydrALAZINE (APRESOLINE) 20 mg/mL injection 10 mg  10 mg IntraVENous Q6H PRN    meclizine (ANTIVERT) tablet 25 mg  25 mg Oral TID    insulin lispro (HUMALOG) injection   SubCUTAneous AC&HS    glucose chewable tablet 16 g  4 Tablet Oral PRN    glucagon (GLUCAGEN) injection 1 mg  1 mg IntraMUSCular PRN    dextrose 10 % infusion 0-250 mL  0-250 mL IntraVENous PRN          Objective:     Patient Vitals for the past 8 hrs:   BP Temp Pulse Resp Height Weight   03/01/22 1411 136/75 99 °F (37.2 °C) 82 17     03/01/22 1400   68      03/01/22 1200   75      03/01/22 1110 (!) 146/87  87      03/01/22 1000   80      03/01/22 0956 130/67 98.4 °F (36.9 °C) 77 16     03/01/22 0905 (!) 161/95    5' 5\" (1.651 m) 167 lb (75.8 kg)   03/01/22 0830 (!) 161/95  94          No intake/output data recorded. No intake/output data recorded. Lab Review   Recent Results (from the past 24 hour(s))   TROPONIN-HIGH SENSITIVITY    Collection Time: 02/28/22  7:29 PM   Result Value Ref Range    Troponin-High Sensitivity 181 (HH) 0 - 51 ng/L   CK    Collection Time: 02/28/22  7:29 PM   Result Value Ref Range     26 - 834 U/L   METABOLIC PANEL, COMPREHENSIVE    Collection Time: 02/28/22  7:29 PM   Result Value Ref Range    Sodium 136 136 - 145 mmol/L    Potassium 3.9 3.5 - 5.1 mmol/L    Chloride 109 (H) 97 - 108 mmol/L    CO2 22 21 - 32 mmol/L    Anion gap 5 5 - 15 mmol/L    Glucose 225 (H) 65 - 100 mg/dL    BUN 35 (H) 6 - 20 MG/DL    Creatinine 1.99 (H) 0.55 - 1.02 MG/DL    BUN/Creatinine ratio 18 12 - 20      GFR est AA 30 (L) >60 ml/min/1.73m2    GFR est non-AA 25 (L) >60 ml/min/1.73m2    Calcium 10.0 8.5 - 10.1 MG/DL    Bilirubin, total 0.6 0.2 - 1.0 MG/DL    ALT (SGPT) 23 12 - 78 U/L    AST (SGOT) 23 15 - 37 U/L    Alk.  phosphatase 129 (H) 45 - 117 U/L    Protein, total 8.2 6.4 - 8.2 g/dL    Albumin 4.0 3.5 - 5.0 g/dL    Globulin 4.2 (H) 2.0 - 4.0 g/dL    A-G Ratio 1.0 (L) 1.1 - 2.2     LIPID PANEL    Collection Time: 03/01/22 12:37 AM   Result Value Ref Range    Cholesterol, total 194 <200 MG/DL    Triglyceride 118 <150 MG/DL    HDL Cholesterol 53 MG/DL    LDL, calculated 117.4 (H) 0 - 100 MG/DL    VLDL, calculated 23.6 MG/DL    CHOL/HDL Ratio 3.7 0.0 - 5.0     HEMOGLOBIN A1C WITH EAG    Collection Time: 03/01/22 12:37 AM   Result Value Ref Range    Hemoglobin A1c 6.6 (H) 4.0 - 5.6 %    Est. average glucose 143 mg/dL   CBC W/O DIFF    Collection Time: 03/01/22 12:37 AM   Result Value Ref Range    WBC 9.5 3.6 - 11.0 K/uL    RBC 5.05 3.80 - 5.20 M/uL    HGB 15.0 11.5 - 16.0 g/dL    HCT 45.6 35.0 - 47.0 %    MCV 90.3 80.0 - 99.0 FL    MCH 29.7 26.0 - 34.0 PG    MCHC 32.9 30.0 - 36.5 g/dL    RDW 14.2 11.5 - 14.5 %    PLATELET 052 232 - 499 K/uL    MPV 9.6 8.9 - 12.9 FL    NRBC 0.0 0  WBC    ABSOLUTE NRBC 0.00 0.00 - 0.01 K/uL   PROTHROMBIN TIME + INR    Collection Time: 03/01/22 12:37 AM   Result Value Ref Range    INR 1.0 0.9 - 1.1      Prothrombin time 10.7 9.0 - 11.1 sec   SED RATE (ESR)    Collection Time: 03/01/22 12:37 AM   Result Value Ref Range    Sed rate, automated 8 0 - 30 mm/hr   CRP, HIGH SENSITIVITY    Collection Time: 03/01/22 12:37 AM   Result Value Ref Range    CRP, High sensitivity 4.7 mg/L   TSH 3RD GENERATION    Collection Time: 03/01/22 12:37 AM   Result Value Ref Range    TSH 0.72 0.36 - 1.14 uIU/mL   METABOLIC PANEL, BASIC    Collection Time: 03/01/22 12:37 AM   Result Value Ref Range    Sodium 138 136 - 145 mmol/L    Potassium 4.0 3.5 - 5.1 mmol/L    Chloride 112 (H) 97 - 108 mmol/L    CO2 22 21 - 32 mmol/L    Anion gap 4 (L) 5 - 15 mmol/L    Glucose 148 (H) 65 - 100 mg/dL    BUN 43 (H) 6 - 20 MG/DL    Creatinine 1.92 (H) 0.55 - 1.02 MG/DL    BUN/Creatinine ratio 22 (H) 12 - 20      GFR est AA 31 (L) >60 ml/min/1.73m2    GFR est non-AA 26 (L) >60 ml/min/1.73m2    Calcium 9.7 8.5 - 10.1 MG/DL   MAGNESIUM    Collection Time: 03/01/22 12:37 AM   Result Value Ref Range    Magnesium 2.2 1.6 - 2.4 mg/dL   PHOSPHORUS    Collection Time: 03/01/22 12:37 AM   Result Value Ref Range    Phosphorus 3.0 2.6 - 4.7 MG/DL   DRUG SCREEN, URINE    Collection Time: 03/01/22 12:39 AM Result Value Ref Range    AMPHETAMINES Negative NEG      BARBITURATES Negative NEG      BENZODIAZEPINES Negative NEG      COCAINE Negative NEG      METHADONE Negative NEG      OPIATES Negative NEG      PCP(PHENCYCLIDINE) Negative NEG      THC (TH-CANNABINOL) Negative NEG      Drug screen comment (NOTE)    CK    Collection Time: 03/01/22 12:39 AM   Result Value Ref Range     26 - 192 U/L   TROPONIN-HIGH SENSITIVITY    Collection Time: 03/01/22 12:39 AM   Result Value Ref Range    Troponin-High Sensitivity 171 (HH) 0 - 51 ng/L   GLUCOSE, POC    Collection Time: 03/01/22  7:15 AM   Result Value Ref Range    Glucose (POC) 127 (H) 65 - 117 mg/dL    Performed by Abran Rivera (Tr[facility])    ECHO ADULT COMPLETE    Collection Time: 03/01/22  9:23 AM   Result Value Ref Range    IVSd 1.0 (A) 0.6 - 0.9 cm    LVIDd 5.6 (A) 3.9 - 5.3 cm    LVIDs 4.0 cm    LVOT Diameter 2.1 cm    LVPWd 1.1 (A) 0.6 - 0.9 cm    LVOT Peak Gradient 3 mmHg    LVOT Peak Velocity 0.9 m/s    RVIDd 3.4 cm    LA Diameter 5.3 cm    Est. RA Pressure 3 mmHg    AV Area by Peak Velocity 2.6 cm2    AV Area by Peak Velocity 2.6 cm2    AV Peak Gradient 6 mmHg    AV Peak Velocity 1.2 m/s    MV A Velocity 1.10 m/s    MV E Wave Deceleration Time 291.5 ms    MV E Velocity 0.67 m/s    LV E' Lateral Velocity 6 cm/s    LV E' Septal Velocity 4 cm/s    MV PHT 84.5 ms    MV Area by PHT 2.6 cm2    PV Peak Gradient 2 mmHg    PV Max Velocity 0.8 m/s    Aortic Root 4.0 cm    Fractional Shortening 2D 29 28 - 44 %    LVIDd Index 3.06 cm/m2    LVIDs Index 2.19 cm/m2    LV RWT Ratio 0.39     LV Mass 2D 234.3 (A) 67 - 162 g    LV Mass 2D Index 128.0 (A) 43 - 95 g/m2    MV E/A 0.61     E/E' Ratio (Averaged) 13.96     E/E' Lateral 11.17     E/E' Septal 16.75     LVOT Area 3.5 cm2    LA Size Index 2.90 cm/m2    LA/AO Root Ratio 1.33     Ao Root Index 2.19 cm/m2    AV Velocity Ratio 0.75    DUPLEX CAROTID BILATERAL    Collection Time: 03/01/22  3:39 PM   Result Value Ref Range Right CCA prox sys 89.9 cm/s    Right CCA prox wagner 18.1 cm/s    Right cca dist sys 54.6 cm/s    Right CCA dist wagner 16.8 cm/s    Right eca sys 91.1 cm/s    RIGHT EXTERNAL CAROTID ARTERY D 9.00 cm/s    Right ICA prox sys 40.5 cm/s    Right ICA prox wagner 14.1 cm/s    Right ICA mid sys 41.9 cm/s    Right ICA mid wagner 20.0 cm/s    Right ICA dist sys 38.3 cm/s    Right ICA dist wagner 20.0 cm/s    Right vertebral sys 36.6 cm/s    RIGHT VERTEBRAL ARTERY D 13.00 cm/s    Right ICA/CCA sys 0.8     Left CCA prox sys 73.5 cm/s    Left CCA prox wagner 20.8 cm/s    Left CCA dist sys 73.5 cm/s    Left CCA dist wagner 15.3 cm/s    Left ECA sys 65.4 cm/s    LEFT EXTERNAL CAROTID ARTERY D 8.30 cm/s    Left ICA prox sys 34.9 cm/s    Left ICA prox wagner 10.3 cm/s    Left ICA mid sys 23.6 cm/s    Left ICA mid wagner 10.8 cm/s    Left ICA dist sys 25.5 cm/s    Left ICA dist wagner 10.8 cm/s    Left vertebral sys 25.4 cm/s    LEFT VERTEBRAL ARTERY D 11.20 cm/s    Left ICA/CCA sys 0.50      Elderly female appearing younger than stated age sitting in bed. HEENT appears grossly unremarkable observation. Abdomen appears nondistended. Cardiopulmonary exams appear unremarkable. Extremities appear warm/dry. Neurologically patient appears alert and oriented attention appears intact. Speech is clear, language fluent. Cranial nerves II through XII appear grossly intact observation not examined in direct fashion. Remainder of examination is deferred.     Assessment:     Margarette Fong is a 67-year-old right-hand-dominant female admitted for acute on chronic dizziness and balance impairment found to have a left cerebellar peduncle ischemic infarct    Plan:   Acute ischemic infarct:   Suspect is secondary to local small vessel disease  Patient has been transitioned to Plavix, is now on atorvastatin hemoglobin A1c is at goal  Suspect secondary to these factors plus blood pressure management  Carotid Dopplers unrevealing, echo remains pending  Once echo obtained without significant finding medical evaluation will largely be complete with ongoing disposition pending  Discussed with patient that we cannot manage every aspect of her life encouraged her to speak with nursing and case management to see what resources may be available to her  Regarding dizziness, can continue meclizine if is significant to the point where she is unable to participate with rehab as nausea etc. could consider Valium if meclizine is not working but would not start unless more prominent symptoms noted    Neurology will reassess later in the week please call with question concerns in the interim      Signed:  Donovan De Dios MD  3/1/2022  4:10 PM

## 2022-03-02 LAB
GLUCOSE BLD STRIP.AUTO-MCNC: 116 MG/DL (ref 65–117)
GLUCOSE BLD STRIP.AUTO-MCNC: 125 MG/DL (ref 65–117)
GLUCOSE BLD STRIP.AUTO-MCNC: 138 MG/DL (ref 65–117)
GLUCOSE BLD STRIP.AUTO-MCNC: 213 MG/DL (ref 65–117)
LEFT CCA DIST DIAS: 15.3 CM/S
LEFT CCA DIST SYS: 73.5 CM/S
LEFT CCA PROX DIAS: 20.8 CM/S
LEFT CCA PROX SYS: 73.5 CM/S
LEFT ECA DIAS: 8.3 CM/S
LEFT ECA SYS: 65.4 CM/S
LEFT ICA DIST DIAS: 10.8 CM/S
LEFT ICA DIST SYS: 25.5 CM/S
LEFT ICA MID DIAS: 10.8 CM/S
LEFT ICA MID SYS: 23.6 CM/S
LEFT ICA PROX DIAS: 10.3 CM/S
LEFT ICA PROX SYS: 34.9 CM/S
LEFT ICA/CCA SYS: 0.5
LEFT VERTEBRAL DIAS: 11.2 CM/S
LEFT VERTEBRAL SYS: 25.4 CM/S
RIGHT CCA DIST DIAS: 16.8 CM/S
RIGHT CCA DIST SYS: 54.6 CM/S
RIGHT CCA PROX DIAS: 18.1 CM/S
RIGHT CCA PROX SYS: 89.9 CM/S
RIGHT ECA DIAS: 9 CM/S
RIGHT ECA SYS: 91.1 CM/S
RIGHT ICA DIST DIAS: 20 CM/S
RIGHT ICA DIST SYS: 38.3 CM/S
RIGHT ICA MID DIAS: 20 CM/S
RIGHT ICA MID SYS: 41.9 CM/S
RIGHT ICA PROX DIAS: 14.1 CM/S
RIGHT ICA PROX SYS: 40.5 CM/S
RIGHT ICA/CCA SYS: 0.8
RIGHT VERTEBRAL DIAS: 13 CM/S
RIGHT VERTEBRAL SYS: 36.6 CM/S
SERVICE CMNT-IMP: ABNORMAL
SERVICE CMNT-IMP: NORMAL

## 2022-03-02 PROCEDURE — 74011250637 HC RX REV CODE- 250/637: Performed by: FAMILY MEDICINE

## 2022-03-02 PROCEDURE — 74011250637 HC RX REV CODE- 250/637: Performed by: PSYCHIATRY & NEUROLOGY

## 2022-03-02 PROCEDURE — 96372 THER/PROPH/DIAG INJ SC/IM: CPT

## 2022-03-02 PROCEDURE — 74011250637 HC RX REV CODE- 250/637: Performed by: STUDENT IN AN ORGANIZED HEALTH CARE EDUCATION/TRAINING PROGRAM

## 2022-03-02 PROCEDURE — G0378 HOSPITAL OBSERVATION PER HR: HCPCS

## 2022-03-02 PROCEDURE — 82962 GLUCOSE BLOOD TEST: CPT

## 2022-03-02 PROCEDURE — 97535 SELF CARE MNGMENT TRAINING: CPT

## 2022-03-02 PROCEDURE — 74011250636 HC RX REV CODE- 250/636: Performed by: STUDENT IN AN ORGANIZED HEALTH CARE EDUCATION/TRAINING PROGRAM

## 2022-03-02 PROCEDURE — 74011250636 HC RX REV CODE- 250/636: Performed by: PSYCHIATRY & NEUROLOGY

## 2022-03-02 PROCEDURE — 74011250636 HC RX REV CODE- 250/636: Performed by: FAMILY MEDICINE

## 2022-03-02 PROCEDURE — 99231 SBSQ HOSP IP/OBS SF/LOW 25: CPT | Performed by: PSYCHIATRY & NEUROLOGY

## 2022-03-02 RX ORDER — POLYETHYLENE GLYCOL 3350 17 G/17G
17 POWDER, FOR SOLUTION ORAL 2 TIMES DAILY
Status: DISCONTINUED | OUTPATIENT
Start: 2022-03-02 | End: 2022-03-04 | Stop reason: HOSPADM

## 2022-03-02 RX ORDER — HYDRALAZINE HYDROCHLORIDE 50 MG/1
50 TABLET, FILM COATED ORAL 3 TIMES DAILY
Status: DISCONTINUED | OUTPATIENT
Start: 2022-03-02 | End: 2022-03-04 | Stop reason: HOSPADM

## 2022-03-02 RX ORDER — INSULIN GLARGINE 100 [IU]/ML
5 INJECTION, SOLUTION SUBCUTANEOUS DAILY
Status: DISCONTINUED | OUTPATIENT
Start: 2022-03-02 | End: 2022-03-03

## 2022-03-02 RX ORDER — MECLIZINE HCL 12.5 MG 12.5 MG/1
25 TABLET ORAL
Status: DISCONTINUED | OUTPATIENT
Start: 2022-03-02 | End: 2022-03-04 | Stop reason: HOSPADM

## 2022-03-02 RX ORDER — DIAZEPAM 2 MG/1
2 TABLET ORAL ONCE
Status: COMPLETED | OUTPATIENT
Start: 2022-03-02 | End: 2022-03-02

## 2022-03-02 RX ORDER — DOCUSATE SODIUM 100 MG/1
100 CAPSULE, LIQUID FILLED ORAL 2 TIMES DAILY
Status: DISCONTINUED | OUTPATIENT
Start: 2022-03-02 | End: 2022-03-04 | Stop reason: HOSPADM

## 2022-03-02 RX ADMIN — CLOPIDOGREL 75 MG: 75 TABLET, FILM COATED ORAL at 09:21

## 2022-03-02 RX ADMIN — HEPARIN SODIUM 5000 UNITS: 5000 INJECTION INTRAVENOUS; SUBCUTANEOUS at 22:20

## 2022-03-02 RX ADMIN — HEPARIN SODIUM 5000 UNITS: 5000 INJECTION INTRAVENOUS; SUBCUTANEOUS at 16:11

## 2022-03-02 RX ADMIN — CLONIDINE HYDROCHLORIDE 0.1 MG: 0.1 TABLET ORAL at 09:20

## 2022-03-02 RX ADMIN — CLONIDINE HYDROCHLORIDE 0.1 MG: 0.1 TABLET ORAL at 17:09

## 2022-03-02 RX ADMIN — DOCUSATE SODIUM 100 MG: 100 CAPSULE, LIQUID FILLED ORAL at 17:09

## 2022-03-02 RX ADMIN — POLYETHYLENE GLYCOL 3350 17 G: 17 POWDER, FOR SOLUTION ORAL at 09:30

## 2022-03-02 RX ADMIN — DOCUSATE SODIUM 100 MG: 100 CAPSULE, LIQUID FILLED ORAL at 09:21

## 2022-03-02 RX ADMIN — ATORVASTATIN CALCIUM 80 MG: 40 TABLET, FILM COATED ORAL at 22:19

## 2022-03-02 RX ADMIN — MECLIZINE 25 MG: 12.5 TABLET ORAL at 22:32

## 2022-03-02 RX ADMIN — DIAZEPAM 2 MG: 2 TABLET ORAL at 14:26

## 2022-03-02 RX ADMIN — FAMOTIDINE 20 MG: 20 TABLET, FILM COATED ORAL at 09:21

## 2022-03-02 RX ADMIN — MECLIZINE 25 MG: 12.5 TABLET ORAL at 09:21

## 2022-03-02 RX ADMIN — HYDRALAZINE HYDROCHLORIDE 50 MG: 50 TABLET, FILM COATED ORAL at 22:20

## 2022-03-02 RX ADMIN — HEPARIN SODIUM 5000 UNITS: 5000 INJECTION INTRAVENOUS; SUBCUTANEOUS at 07:14

## 2022-03-02 RX ADMIN — HYDRALAZINE HYDROCHLORIDE 50 MG: 50 TABLET, FILM COATED ORAL at 16:11

## 2022-03-02 RX ADMIN — SERTRALINE 25 MG: 50 TABLET, FILM COATED ORAL at 22:18

## 2022-03-02 RX ADMIN — HYDRALAZINE HYDROCHLORIDE 25 MG: 25 TABLET, FILM COATED ORAL at 09:20

## 2022-03-02 RX ADMIN — POLYETHYLENE GLYCOL 3350 17 G: 17 POWDER, FOR SOLUTION ORAL at 17:09

## 2022-03-02 RX ADMIN — METOPROLOL SUCCINATE 50 MG: 50 TABLET, EXTENDED RELEASE ORAL at 09:20

## 2022-03-02 NOTE — PROGRESS NOTES
Transition of Care Plan: IPR ([facility]-referral sent)     RUR: N/A     PCP F/U: Julian Lopez MD     Disposition: IPR     Transportation: S     Main Contact: son Sara Maldonado (primary [phone]) and Marivel Christian (ex-spouse [phone])  Kimberlee Weiner [phone]     0830: Received phone call from Davey Jesús at [address]. State that they had received a report that patient is unable to care for self at home. Wanted to know what the discharge plan was. Informed her that recommendation is IPR. States that she will follow along for discharge plan and for this CM to inform her when patient discharges    1331: Spoke with patient at bedside. States she lives alone. Does receive some support from ex  who is currently dog sitting. Discussed recommendation for IPR. States she would like referral to [facility]. Referral sent.      1405: [facility] states that they are reviewing case    Shamar Jackson RN, CRM

## 2022-03-02 NOTE — PROGRESS NOTES
Problem: Self Care Deficits Care Plan (Adult)  Goal: *Acute Goals and Plan of Care (Insert Text)  Description: FUNCTIONAL STATUS PRIOR TO ADMISSION: Patient was modified independent using a single point cane for functional mobility. Drives. Walks her dog. HOME SUPPORT: The patient lived alone with no local support per pt, but her friend is caring for her dog. Occupational Therapy Goals  Initiated 3/1/2022   1. Patient will perform grooming standing at sink with contact guard assist within 7 day(s). 2.  Patient will perform lower body dressing with contact guard assist within 7 day(s). 3.  Patient will perform toilet transfers with contact guard assist within 7 day(s). 4.  Patient will perform all aspects of toileting with contact guard assist within 7 day(s). 5.  Patient will participate in upper extremity therapeutic exercise/activities with modified independence within 7 day(s). 6.  Patient will utilize energy conservation techniques during functional activities with verbal and visual cues within 7 day(s). 8.  Patient will improve their LUE Fugl Munoz score by 2 points in prep for ADLs within 7 days. Outcome: Progressing Towards Goal    OCCUPATIONAL THERAPY TREATMENT  Patient: Janice Finn (74 y.o. female)  Date: 3/2/2022  Diagnosis: Dizziness [R42] <principal problem not specified>       Precautions: Fall,Bed Alarm  Chart, occupational therapy assessment, plan of care, and goals were reviewed. ASSESSMENT  Patient continues with skilled OT services and is progressing towards goals. Patient ADLs continue to be limited by impaired balance, c/o dizziness, decreased functional activity tolerance, generalized weakness and impaired cognition (attention to task, command following, safety awareness, insight into deficits, and sequencing). Patient completed bed mobility with up to CGA and functional mobility using RW with min A.  Patient required setup to change socks in long sitting/tailor sitting in bed prior to transfer. Patient required min A for toileting and SBA after setup for grooming while sitting EOB. Patient left in chair with call bell in reach, RN aware. Will continue to follow, recommend IPR once cleared for D/C. Current Level of Function Impacting Discharge (ADLs): up to mod A for ADLs    Other factors to consider for discharge: lives alone, limited support, questionable capacity. PLAN :  Patient continues to benefit from skilled intervention to address the above impairments. Continue treatment per established plan of care to address goals. Recommend with staff: Recommend with nursing, ADLs with supervision/setup, OOB to chair 3x/day via rolling walker and toileting via functional mobility to and from bathroom. Thank you for completing as able in order to maintain patient strength, endurance and independence. Recommend next OT session: dressing routine    Recommendation for discharge: (in order for the patient to meet his/her long term goals)  Therapy 3 hours per day 5-7 days per week    This discharge recommendation:  Has been made in collaboration with the attending provider and/or case management    IF patient discharges home will need the following DME: bedside commode and shower chair       SUBJECTIVE:   Patient stated I can't take care of myself or my dog.     OBJECTIVE DATA SUMMARY:   Cognitive/Behavioral Status:  Neurologic State: Alert  Orientation Level: Oriented X4  Cognition: Decreased attention/concentration; Follows commands; Impaired decision making; Impulsive;Poor safety awareness  Perception: Cues to maintain midline in standing (tipping head to L (cervical flexion))  Perseveration: Perseverates during conversation (on being dizzy and on all her medical issues)  Safety/Judgement: Awareness of environment;Decreased awareness of need for safety;Decreased insight into deficits; Fall prevention    Functional Mobility and Transfers for ADLs:  Bed Mobility:  Supine to Sit: Supervision    Transfers:  Sit to Stand: Minimum assistance  Functional Transfers  Toilet Transfer : Minimum assistance  Cues: Physical assistance;Verbal cues provided  Adaptive Equipment: Walker (comment)     Balance:  Sitting: Intact  Standing: Impaired; With support  Standing - Static: Constant support; Fair  Standing - Dynamic : Constant support;Fair;Poor    ADL Intervention:     Grooming  Position Performed: Seated edge of bed  Washing Face: Set-up  Brushing Teeth: Set-up  Cues: Verbal cues provided    Lower Body Dressing Assistance  Socks: Set-up; Compensatory technique training  Leg Crossed Method Used: Yes  Position Performed: Long sitting on bed  Cues: Don;Doff;Verbal cues provided    Toileting  Bladder Hygiene: Stand-by assistance  Bowel Hygiene: Stand-by assistance  Clothing Management: Minimum assistance  Cues: Physical assistance for pants up; Tactile cues provided;Verbal cues provided  Adaptive Equipment: Walker    Cognitive Retraining  Safety/Judgement: Awareness of environment;Decreased awareness of need for safety;Decreased insight into deficits; Fall prevention    Pain:  Reporting no pain    Activity Tolerance:   Fair and requires rest breaks    After treatment patient left in no apparent distress:   Sitting in chair, Call bell within reach, and RN aware    COMMUNICATION/COLLABORATION:   The patients plan of care was discussed with: Physical therapist and Registered nurse.      Edison Espinoza OT  Time Calculation: 29 mins

## 2022-03-02 NOTE — PROGRESS NOTES
Problem: TIA/CVA Stroke: 0-24 hours  Goal: Diagnostic Test/Procedures  Outcome: Progressing Towards Goal  Goal: Discharge Planning  Outcome: Progressing Towards Goal

## 2022-03-02 NOTE — PROGRESS NOTES
Spiritual Care Assessment/Progress Note  [facility]      NAME: Gustavo Mcwilliams      MRN: [id number]  AGE: 80 y.o. SEX: female  Congregational Affiliation: Anglican   Language: English     3/2/2022     Total Time (in minutes): 20     Spiritual Assessment begun in [address] through conversation with:         [x]Patient        [] Family    [] Friend(s)        Reason for Consult: Initial visit     Spiritual beliefs: (Please include comment if needed)     [] Identifies with a solis tradition:         [] Supported by a solis community:            [] Claims no spiritual orientation:           [] Seeking spiritual identity:                [] Adheres to an individual form of spirituality:           [x] Not able to assess:                           Identified resources for coping:      [] Prayer                               [] Music                  [] Guided Imagery     [] Family/friends                 [] Pet visits     [] Devotional reading                         [x] Unknown     [] Other:                                               Interventions offered during this visit: (See comments for more details)                Plan of Care:     [] Support spiritual and/or cultural needs    [] Support AMD and/or advance care planning process      [] Support grieving process   [] Coordinate Rites and/or Rituals    [] Coordination with community clergy   [] No spiritual needs identified at this time   [] Detailed Plan of Care below (See Comments)  [] Make referral to Music Therapy  [] Make referral to Pet Therapy     [] Make referral to Addiction services  [] Make referral to [facility]  [] Make referral to Spiritual Care Partner  [] No future visits requested        [x] Contact Spiritual Care for further referrals     Comments:  visit as there was conversation about Advance Medical Directive. Pt was unavailable. Please contact [phone] for further support.      [address] Gretchen Krishnan, [facility]   [phone] [phone]

## 2022-03-02 NOTE — PROGRESS NOTES
VM received from [facility] Neurology [facility] [phone]. Call placed back spoke w/ Gino Gonsalez requesting inpatient CM name/number provided [place] - will provide updates regarding community involvement/interventions.

## 2022-03-02 NOTE — PROGRESS NOTES
[facility]                                                                                          Hospitalist Progress Note  Stephan Quijano DO  Answering service: [phone] from in house phone        Date of Service:  3/2/2022  NAME:  Andreia Del Valle  :  1950  MRN:  [id number]      Admission Summary:   Patient presented to the ER with dizziness, reports her symptoms started 3 days back, reports that she feels dizzy and lightheaded, feels as if she was in a fall, came to the ER today, deemed as a code stroke and was requested to be admitted to the hospital service, patient reports that she has had off-and-on dizziness in the past but is not able to elaborate the same, patient denies any other complaints or problem     The patient denies any headache, blurry vision, sore throat, trouble swallowing, trouble with speech, chest pain, SOB, cough, fever, chills, N/V/D, abd pain, urinary symptoms, constipation, recent travels, sick contacts, , falls, injuries, rashes, contact with COVID-19 diagnosed patients, hematemesis, melena, hemoptysis, hematuria, rashes, denies starting any new medications and denies any other concerns or problems besides as mentioned above.            Interval history / Subjective:   Patient was seen and examined this morning. Patient denies any new complaints. Patient denies fever, chills, shortness of breath, chest pain, abdominal pain, nausea, vomiting, and diarrhea. No overnight events reported by nursing staff. Patient reports continued dizziness. Discussed with the patient at bedside today.          Assessment & Plan:     -Acute infarct in the left middle cerebellar peduncle  --Antiplatelet therapy  -Statin therapy  -Echocardiogram reviewed  -Carotid ultrasound with mild stenosis in the right internal carotid artery  --PT/OT  --Neurology following, appreciate recommendations    --Dizziness, will continue with meclizine, will start trial of Valium    Accelerated hypertension-improved  --Toprol 50 mg daily, increased hydralazine 50 mg 3 times daily, clonidine 0.1 mg twice daily  --We will continue to monitor closely    ALEJANDRO on CKD  --Gentle fluid hydration with NS      Type 2 diabetes  --Sliding scale Humalog            Code status:  Full code  Prophylaxis: Heparin  Care Plan discussed with: Patient/RN/CM  Anticipated Disposition: 1 to 2 days     Hospital Problems  Date Reviewed: 11/18/2020          Codes Class Noted POA    Dizziness ICD-10-CM: R42  ICD-9-CM: 780.4  2/28/2022 Unknown                Review of Systems:   A comprehensive review of systems was negative except for that written in the HPI. Vital Signs:    Last 24hrs VS reviewed since prior progress note. Most recent are:  Visit Vitals  BP (!) 163/79 (BP 1 Location: Left upper arm)   Pulse 62   Temp 98.6 °F (37 °C)   Resp 18   Ht 5' 5\" (1.651 m)   Wt 81.9 kg (180 lb 8 oz)   SpO2 97%   BMI 30.04 kg/m²         Intake/Output Summary (Last 24 hours) at 3/2/2022 1305  Last data filed at 3/2/2022 1103  Gross per 24 hour   Intake    Output 300 ml   Net -300 ml        Physical Examination:     I had a face to face encounter with this patient and independently examined them on 3/2/2022 as outlined below:          Constitutional:  No acute distress, cooperative, pleasant    ENT:  Oral mucosa moist, oropharynx benign. Resp:  CTA bilaterally. No wheezing/rhonchi/rales. No accessory muscle use. CV:  Regular rhythm, normal rate, no murmurs, gallops, rubs    GI:  Soft, non distended, non tender. normoactive bowel sounds, no hepatosplenomegaly     Musculoskeletal:  No edema, warm, 2+ pulses throughout    Neurologic:  Moves all extremities.   AAOx3, CN II-XII reviewed            Data Review:    Review and/or order of clinical lab test      Labs:     Recent Labs     03/01/22  0037 02/28/22  1227   WBC 9.5 8.9   HGB 15.0 16.7*   HCT 45.6 51.3*    377     Recent Labs 03/01/22 0037 02/28/22 1929 02/28/22 1227    136 139   K 4.0 3.9 3.9   * 109* 110*   CO2 22 22 24   BUN 43* 35* 36*   CREA 1.92* 1.99* 1.93*   * 225* 131*   CA 9.7 10.0 10.2*   MG 2.2  --   --    PHOS 3.0  --   --      Recent Labs     02/28/22 1929 02/28/22 1227   ALT 23 22   * 136*   TBILI 0.6 0.7   TP 8.2 8.4*   ALB 4.0 4.2   GLOB 4.2* 4.2*     Recent Labs     03/01/22 0037 02/28/22 1227   INR 1.0 1.0   PTP 10.7 10.7   APTT  --  30.2      No results for input(s): FE, TIBC, PSAT, FERR in the last 72 hours. No results found for: FOL, RBCF   No results for input(s): PH, PCO2, PO2 in the last 72 hours.   Recent Labs     03/01/22 0039 02/28/22 1929    127     Lab Results   Component Value Date/Time    Cholesterol, total 194 03/01/2022 12:37 AM    HDL Cholesterol 53 03/01/2022 12:37 AM    LDL, calculated 117.4 (H) 03/01/2022 12:37 AM    Triglyceride 118 03/01/2022 12:37 AM    CHOL/HDL Ratio 3.7 03/01/2022 12:37 AM     Lab Results   Component Value Date/Time    Glucose (POC) 213 (H) 03/02/2022 11:28 AM    Glucose (POC) 138 (H) 03/02/2022 07:17 AM    Glucose (POC) 158 (H) 03/01/2022 09:00 PM    Glucose (POC) 91 03/01/2022 05:19 PM    Glucose (POC) 127 (H) 03/01/2022 07:15 AM     No results found for: COLOR, APPRN, SPGRU, REFSG, BEATA, PROTU, GLUCU, KETU, BILU, UROU, SNEHAL, LEUKU, GLUKE, EPSU, BACTU, WBCU, RBCU, CASTS, UCRY      Medications Reviewed:     Current Facility-Administered Medications   Medication Dose Route Frequency    polyethylene glycol (MIRALAX) packet 17 g  17 g Oral BID    docusate sodium (COLACE) capsule 100 mg  100 mg Oral BID    diazePAM (VALIUM) tablet 2 mg  2 mg Oral ONCE    insulin glargine (LANTUS) injection 5 Units  5 Units SubCUTAneous DAILY    hydrALAZINE (APRESOLINE) tablet 50 mg  50 mg Oral TID    heparin (porcine) injection 5,000 Units  5,000 Units SubCUTAneous Q8H    clopidogreL (PLAVIX) tablet 75 mg  75 mg Oral DAILY    cloNIDine HCL (CATAPRES) tablet 0.1 mg  0.1 mg Oral BID    [Held by provider] glipiZIDE (GLUCOTROL) tablet 20 mg  20 mg Oral DAILY    [Held by provider] metFORMIN (GLUCOPHAGE) tablet 1,000 mg  1,000 mg Oral BID WITH MEALS    metoprolol succinate (TOPROL-XL) XL tablet 50 mg  50 mg Oral DAILY    famotidine (PEPCID) tablet 20 mg  20 mg Oral DAILY    sertraline (ZOLOFT) tablet 25 mg  25 mg Oral QHS    acetaminophen (TYLENOL) tablet 650 mg  650 mg Oral Q4H PRN    Or    acetaminophen (TYLENOL) solution 650 mg  650 mg Per NG tube Q4H PRN    Or    acetaminophen (TYLENOL) suppository 650 mg  650 mg Rectal Q4H PRN    atorvastatin (LIPITOR) tablet 80 mg  80 mg Oral QHS    hydrALAZINE (APRESOLINE) 20 mg/mL injection 10 mg  10 mg IntraVENous Q6H PRN    meclizine (ANTIVERT) tablet 25 mg  25 mg Oral TID    insulin lispro (HUMALOG) injection   SubCUTAneous AC&HS    glucose chewable tablet 16 g  4 Tablet Oral PRN    glucagon (GLUCAGEN) injection 1 mg  1 mg IntraMUSCular PRN    dextrose 10 % infusion 0-250 mL  0-250 mL IntraVENous PRN     ______________________________________________________________________  EXPECTED LENGTH OF STAY: - - -  ACTUAL LENGTH OF STAY:          0                 Karlee Taveras DO

## 2022-03-02 NOTE — PROGRESS NOTES
Neurology Progress Note    Patient ID:  Yusra Manual  [id number]  80 y.o.  1950    Subjective:      No significant change noted patient continues to complain of significant dizziness. She also continues to fixate on her medical conditions and somewhat expressed the perspective of victims anxious and asked for any of her medical conditions asking why she had a stroke now. Apologize for not giving precision answers that she wanted such as when exactly her facial numbness will improve.     Current Facility-Administered Medications   Medication Dose Route Frequency    polyethylene glycol (MIRALAX) packet 17 g  17 g Oral BID    docusate sodium (COLACE) capsule 100 mg  100 mg Oral BID    insulin glargine (LANTUS) injection 5 Units  5 Units SubCUTAneous DAILY    hydrALAZINE (APRESOLINE) tablet 50 mg  50 mg Oral TID    meclizine (ANTIVERT) tablet 25 mg  25 mg Oral TID PRN    heparin (porcine) injection 5,000 Units  5,000 Units SubCUTAneous Q8H    clopidogreL (PLAVIX) tablet 75 mg  75 mg Oral DAILY    cloNIDine HCL (CATAPRES) tablet 0.1 mg  0.1 mg Oral BID    [Held by provider] glipiZIDE (GLUCOTROL) tablet 20 mg  20 mg Oral DAILY    [Held by provider] metFORMIN (GLUCOPHAGE) tablet 1,000 mg  1,000 mg Oral BID WITH MEALS    metoprolol succinate (TOPROL-XL) XL tablet 50 mg  50 mg Oral DAILY    famotidine (PEPCID) tablet 20 mg  20 mg Oral DAILY    sertraline (ZOLOFT) tablet 25 mg  25 mg Oral QHS    acetaminophen (TYLENOL) tablet 650 mg  650 mg Oral Q4H PRN    Or    acetaminophen (TYLENOL) solution 650 mg  650 mg Per NG tube Q4H PRN    Or    acetaminophen (TYLENOL) suppository 650 mg  650 mg Rectal Q4H PRN    atorvastatin (LIPITOR) tablet 80 mg  80 mg Oral QHS    hydrALAZINE (APRESOLINE) 20 mg/mL injection 10 mg  10 mg IntraVENous Q6H PRN    insulin lispro (HUMALOG) injection   SubCUTAneous AC&HS    glucose chewable tablet 16 g  4 Tablet Oral PRN    glucagon (GLUCAGEN) injection 1 mg  1 mg IntraMUSCular PRN    dextrose 10 % infusion 0-250 mL  0-250 mL IntraVENous PRN          Objective:     Patient Vitals for the past 8 hrs:   BP Temp Pulse Resp SpO2   03/02/22 1420 (!) 142/75  78 15    03/02/22 1400   76     03/02/22 1200   62     03/02/22 0945 (!) 163/79 98.6 °F (37 °C) 76 18 97 %   03/02/22 0920 (!) 157/84  75         No intake/output data recorded. 02/28 1901 - 03/02 0700  In: -   Out: 300 [Urine:300]    Lab Review   Recent Results (from the past 24 hour(s))   GLUCOSE, POC    Collection Time: 03/01/22  5:19 PM   Result Value Ref Range    Glucose (POC) 91 65 - 117 mg/dL    Performed by [address] [place] [address], POC    Collection Time: 03/01/22  9:00 PM   Result Value Ref Range    Glucose (POC) 158 (H) 65 - 117 mg/dL    Performed by Sarthak Jade (Trvlr)    GLUCOSE, POC    Collection Time: 03/02/22  7:17 AM   Result Value Ref Range    Glucose (POC) 138 (H) 65 - 117 mg/dL    Performed by Sarthak Jade (Tr[facility])    GLUCOSE, POC    Collection Time: 03/02/22 11:28 AM   Result Value Ref Range    Glucose (POC) 213 (H) 65 - 117 mg/dL    Performed by Brian CONNELLY      Somewhat despondent appearing female resting in chair mentioning being significantly dizzy though appears overall comfortable. HEENT is grossly unremarkable neck appears supple. Cardiopulmonary exams are deferred. Abdomen is nondistended. Extremities are warm/dry. Neurologically patient is alert and oriented attention is intact. Speech is clear, language fluent. Cranial nerves II 12 are notable for eccentric nystagmus sensory testing not performed. Motor exam patient has full strength in right arm and leg, has a degree of weakness in left arm and prominent limb ataxia.   Remainder examination is deferred    Assessment:     Ish Ferguson is a 69-year-old female with multivessel risk factors and chronic dizziness who presented to [facility] with worsening vertigo along with left facial numbness found to have a left cerebellar peduncle infarct    Plan:   Acute ischemic stroke:  Present in context of multiple vascular risk factors including moderately controlled hypertension, dyslipidemia and diabetes  Patient has undergone appropriate medication adjustments now on Plavix, as well as high intensity statin  Blood pressure remains intermittently elevated would maintain goal in the hospital of less than 160/90 on average, long-term less than 140/90 at minimum with a ideal goal of less than 130/80  Echocardiogram is nonconcerning  Patient may benefit from referral to a counselor as she she appears to be well on her medical conditions and fixate as to why they happen her rather than establishing a plan to address them head on and move forward  At this juncture patient remains awaiting referral to therapy despite her frequent complaints of being severely dizzy she appears rather comfortable is not nauseous change meclizine to as needed to monitor if there is any decline with not getting this medication regularly if complaints are unchanged could consider emotional component as making it more prominent  Certainly has reasons to be vertiginous though overall again she appears rather comfortable  From a neurology standpoint can be transferred to rehab once disposition is solidified  Has a stroke neurologist at [address] [place] [address]) should referred to her for follow-up      Signed:  Obed Aceves MD  3/2/2022  3:42 PM

## 2022-03-03 ENCOUNTER — APPOINTMENT (OUTPATIENT)
Dept: ULTRASOUND IMAGING | Age: 72
DRG: 065 | End: 2022-03-03
Attending: STUDENT IN AN ORGANIZED HEALTH CARE EDUCATION/TRAINING PROGRAM
Payer: MEDICARE

## 2022-03-03 LAB
ALBUMIN SERPL-MCNC: 3.2 G/DL (ref 3.5–5)
ALBUMIN/GLOB SERPL: 1 {RATIO} (ref 1.1–2.2)
ALP SERPL-CCNC: 108 U/L (ref 45–117)
ALT SERPL-CCNC: 22 U/L (ref 12–78)
ANION GAP SERPL CALC-SCNC: 6 MMOL/L (ref 5–15)
AST SERPL-CCNC: 12 U/L (ref 15–37)
BILIRUB SERPL-MCNC: 0.7 MG/DL (ref 0.2–1)
BUN SERPL-MCNC: 47 MG/DL (ref 6–20)
BUN/CREAT SERPL: 22 (ref 12–20)
CALCIUM SERPL-MCNC: 9.4 MG/DL (ref 8.5–10.1)
CHLORIDE SERPL-SCNC: 112 MMOL/L (ref 97–108)
CHLORIDE UR-SCNC: 94 MMOL/L
CO2 SERPL-SCNC: 21 MMOL/L (ref 21–32)
CREAT SERPL-MCNC: 2.15 MG/DL (ref 0.55–1.02)
CREAT UR-MCNC: 116 MG/DL
CREAT UR-MCNC: 118 MG/DL
ERYTHROCYTE [DISTWIDTH] IN BLOOD BY AUTOMATED COUNT: 14.3 % (ref 11.5–14.5)
GLOBULIN SER CALC-MCNC: 3.2 G/DL (ref 2–4)
GLUCOSE BLD STRIP.AUTO-MCNC: 133 MG/DL (ref 65–117)
GLUCOSE BLD STRIP.AUTO-MCNC: 144 MG/DL (ref 65–117)
GLUCOSE BLD STRIP.AUTO-MCNC: 148 MG/DL (ref 65–117)
GLUCOSE BLD STRIP.AUTO-MCNC: 200 MG/DL (ref 65–117)
GLUCOSE SERPL-MCNC: 139 MG/DL (ref 65–100)
HCT VFR BLD AUTO: 43.4 % (ref 35–47)
HGB BLD-MCNC: 14.1 G/DL (ref 11.5–16)
MCH RBC QN AUTO: 29.5 PG (ref 26–34)
MCHC RBC AUTO-ENTMCNC: 32.5 G/DL (ref 30–36.5)
MCV RBC AUTO: 90.8 FL (ref 80–99)
NRBC # BLD: 0 K/UL (ref 0–0.01)
NRBC BLD-RTO: 0 PER 100 WBC
OSMOLALITY UR: 646 MOSM/KG H2O
PLATELET # BLD AUTO: 339 K/UL (ref 150–400)
PMV BLD AUTO: 9.8 FL (ref 8.9–12.9)
POTASSIUM SERPL-SCNC: 4.6 MMOL/L (ref 3.5–5.1)
PROT SERPL-MCNC: 6.4 G/DL (ref 6.4–8.2)
PROT UR-MCNC: 17 MG/DL (ref 0–11.9)
PROT/CREAT UR-RTO: 0.1
RBC # BLD AUTO: 4.78 M/UL (ref 3.8–5.2)
SARS-COV-2, COV2: NORMAL
SERVICE CMNT-IMP: ABNORMAL
SODIUM SERPL-SCNC: 139 MMOL/L (ref 136–145)
SODIUM UR-SCNC: 68 MMOL/L
UR CULT HOLD, URHOLD: NORMAL
WBC # BLD AUTO: 10.1 K/UL (ref 3.6–11)

## 2022-03-03 PROCEDURE — 74011250636 HC RX REV CODE- 250/636: Performed by: PSYCHIATRY & NEUROLOGY

## 2022-03-03 PROCEDURE — 74011250636 HC RX REV CODE- 250/636: Performed by: INTERNAL MEDICINE

## 2022-03-03 PROCEDURE — 36415 COLL VENOUS BLD VENIPUNCTURE: CPT

## 2022-03-03 PROCEDURE — 76770 US EXAM ABDO BACK WALL COMP: CPT

## 2022-03-03 PROCEDURE — G0378 HOSPITAL OBSERVATION PER HR: HCPCS

## 2022-03-03 PROCEDURE — 74011250637 HC RX REV CODE- 250/637: Performed by: PSYCHIATRY & NEUROLOGY

## 2022-03-03 PROCEDURE — 97535 SELF CARE MNGMENT TRAINING: CPT

## 2022-03-03 PROCEDURE — 84156 ASSAY OF PROTEIN URINE: CPT

## 2022-03-03 PROCEDURE — 80053 COMPREHEN METABOLIC PANEL: CPT

## 2022-03-03 PROCEDURE — 82570 ASSAY OF URINE CREATININE: CPT

## 2022-03-03 PROCEDURE — 85027 COMPLETE CBC AUTOMATED: CPT

## 2022-03-03 PROCEDURE — 83935 ASSAY OF URINE OSMOLALITY: CPT

## 2022-03-03 PROCEDURE — 96372 THER/PROPH/DIAG INJ SC/IM: CPT

## 2022-03-03 PROCEDURE — 92507 TX SP LANG VOICE COMM INDIV: CPT | Performed by: SPEECH-LANGUAGE PATHOLOGIST

## 2022-03-03 PROCEDURE — 97116 GAIT TRAINING THERAPY: CPT

## 2022-03-03 PROCEDURE — 84300 ASSAY OF URINE SODIUM: CPT

## 2022-03-03 PROCEDURE — 74011250637 HC RX REV CODE- 250/637: Performed by: FAMILY MEDICINE

## 2022-03-03 PROCEDURE — 82436 ASSAY OF URINE CHLORIDE: CPT

## 2022-03-03 PROCEDURE — U0005 INFEC AGEN DETEC AMPLI PROBE: HCPCS

## 2022-03-03 PROCEDURE — 74011250636 HC RX REV CODE- 250/636: Performed by: STUDENT IN AN ORGANIZED HEALTH CARE EDUCATION/TRAINING PROGRAM

## 2022-03-03 PROCEDURE — 74011250637 HC RX REV CODE- 250/637: Performed by: STUDENT IN AN ORGANIZED HEALTH CARE EDUCATION/TRAINING PROGRAM

## 2022-03-03 PROCEDURE — 82962 GLUCOSE BLOOD TEST: CPT

## 2022-03-03 RX ORDER — SODIUM CHLORIDE, SODIUM LACTATE, POTASSIUM CHLORIDE, CALCIUM CHLORIDE 600; 310; 30; 20 MG/100ML; MG/100ML; MG/100ML; MG/100ML
65 INJECTION, SOLUTION INTRAVENOUS CONTINUOUS
Status: DISCONTINUED | OUTPATIENT
Start: 2022-03-03 | End: 2022-03-04 | Stop reason: HOSPADM

## 2022-03-03 RX ORDER — INSULIN GLARGINE 100 [IU]/ML
8 INJECTION, SOLUTION SUBCUTANEOUS DAILY
Status: DISCONTINUED | OUTPATIENT
Start: 2022-03-04 | End: 2022-03-04 | Stop reason: HOSPADM

## 2022-03-03 RX ADMIN — HEPARIN SODIUM 5000 UNITS: 5000 INJECTION INTRAVENOUS; SUBCUTANEOUS at 15:13

## 2022-03-03 RX ADMIN — HYDRALAZINE HYDROCHLORIDE 50 MG: 50 TABLET, FILM COATED ORAL at 16:57

## 2022-03-03 RX ADMIN — MECLIZINE 25 MG: 12.5 TABLET ORAL at 16:57

## 2022-03-03 RX ADMIN — CLONIDINE HYDROCHLORIDE 0.1 MG: 0.1 TABLET ORAL at 09:52

## 2022-03-03 RX ADMIN — HEPARIN SODIUM 5000 UNITS: 5000 INJECTION INTRAVENOUS; SUBCUTANEOUS at 21:55

## 2022-03-03 RX ADMIN — POLYETHYLENE GLYCOL 3350 17 G: 17 POWDER, FOR SOLUTION ORAL at 10:44

## 2022-03-03 RX ADMIN — SODIUM CHLORIDE, POTASSIUM CHLORIDE, SODIUM LACTATE AND CALCIUM CHLORIDE 65 ML/HR: 600; 310; 30; 20 INJECTION, SOLUTION INTRAVENOUS at 12:39

## 2022-03-03 RX ADMIN — METOPROLOL SUCCINATE 50 MG: 50 TABLET, EXTENDED RELEASE ORAL at 09:52

## 2022-03-03 RX ADMIN — DOCUSATE SODIUM 100 MG: 100 CAPSULE, LIQUID FILLED ORAL at 18:21

## 2022-03-03 RX ADMIN — FAMOTIDINE 20 MG: 20 TABLET, FILM COATED ORAL at 09:52

## 2022-03-03 RX ADMIN — DOCUSATE SODIUM 100 MG: 100 CAPSULE, LIQUID FILLED ORAL at 09:53

## 2022-03-03 RX ADMIN — SERTRALINE 25 MG: 50 TABLET, FILM COATED ORAL at 21:55

## 2022-03-03 RX ADMIN — HYDRALAZINE HYDROCHLORIDE 50 MG: 50 TABLET, FILM COATED ORAL at 09:52

## 2022-03-03 RX ADMIN — CLOPIDOGREL 75 MG: 75 TABLET, FILM COATED ORAL at 09:51

## 2022-03-03 RX ADMIN — ATORVASTATIN CALCIUM 80 MG: 40 TABLET, FILM COATED ORAL at 21:55

## 2022-03-03 RX ADMIN — CLONIDINE HYDROCHLORIDE 0.1 MG: 0.1 TABLET ORAL at 18:21

## 2022-03-03 RX ADMIN — HYDRALAZINE HYDROCHLORIDE 50 MG: 50 TABLET, FILM COATED ORAL at 21:55

## 2022-03-03 NOTE — ACP (ADVANCE CARE PLANNING)
Advance Care Planning   Advance Care Planning Inpatient Note  [facility] [address] Department    Today's Date: 3/3/2022  Unit: [facility] 6S NEURO-SCI TELE    Received request from patient and family. Upon review of chart and communication with care team, patient's decision making abilities are not in question. Patient and Hoang Roy (pt's ex ) by phone was/were present in the room during visit. Goals of ACP Conversation:  Discuss Advance Care planning documents  Facilitate a discussion related to patient's goals of care as they align with the patient's values and beliefs    Health Care Decision Makers:      Primary Decision Maker: Marco A Mitchell - [phone]    Secondary Decision Maker: Andrés Serrano - Son - [phone]      Summary:  Verified [address] (Patient Wishes) on file:  Healthcare Power of /Advance Directive appointment of Health care agent  Living Will/ Advance Directive  Anatomical Gift/Organ donation     Assessment:     talked with pt and Hoang Roy (ex  and friend) by phone. On AMD, that is in pt's chart that she completed in 2017, pt named son Olya Riley and Hoang Roy. Pt would like Bill to be the primary decision maker as he lives close by and is supportive.      Interventions:  Provided education on documents for clarity and greater understanding  Discussed and provided education on state decision maker hierarchy  Reviewed but did not complete ACP document    Care Preferences Communicated:  No    Outcomes/Plan:  ACP Discussion Completed  Existing Advance Directive reviewed with patient; no changes to patient's previously recorded wishes    Raysa Sanchez on 3/3/2022 at 2:31 PM

## 2022-03-03 NOTE — PROGRESS NOTES
Spiritual Care Assessment/Progress Note  [facility]      NAME: Bahman Holloway      MRN: [id number]  AGE: 80 y.o.  SEX: female  Lutheran Affiliation: Quaker   Language: English     3/3/2022     Total Time (in minutes): 54     Spiritual Assessment begun in [address] through conversation with:         [x]Patient        [x] Family    [] Friend(s)        Reason for Consult: Advance medical directive consult     Spiritual beliefs: (Please include comment if needed)     [x] Identifies with a solis tradition:         [] Supported by a solis community:            [] Claims no spiritual orientation:           [] Seeking spiritual identity:                [] Adheres to an individual form of spirituality:           [] Not able to assess:                           Identified resources for coping:      [x] Prayer                               [] Music                  [] Guided Imagery     [x] Family/friends                 [] Pet visits     [] Devotional reading                         [] Unknown     [] Other:                                               Interventions offered during this visit: (See comments for more details)    Patient Interventions: Advance medical directive consult,Affirmation of emotions/emotional suffering,Affirmation of solis,Catharsis/review of pertinent events in supportive environment,Coping skills reviewed/reinforced,Normalization of emotional/spiritual concerns           Plan of Care:     [] Support spiritual and/or cultural needs    [] Support AMD and/or advance care planning process      [] Support grieving process   [] Coordinate Rites and/or Rituals    [] Coordination with community clergy   [] No spiritual needs identified at this time   [] Detailed Plan of Care below (See Comments)  [] Make referral to Music Therapy  [] Make referral to Pet Therapy     [] Make referral to Addiction services  [] Make referral to [facility]  [] Make referral to Spiritual Care Partner  [] No future visits requested        [x] Contact Spiritual Care for further referrals     Comments:  visit for Advance Medical Directive (AMD) consult.  talked with pt and Nell Wheeler (ex  and friend) by phone. On AMD, that is in pt's chart that she completed in 2017, pt named son John Raphael and Nell Wheeler. Pt would like Bill to be the primary decision maker as he lives close by and is supportive. Provided pastoral listening and support. Let them know of  availability. Please contact [phone] [facility] for further support.      [address] Gretchen Krishnan, MACE   [phone]

## 2022-03-03 NOTE — PROGRESS NOTES
Problem: Mobility Impaired (Adult and Pediatric)  Goal: *Acute Goals and Plan of Care (Insert Text)  Description: FUNCTIONAL STATUS PRIOR TO ADMISSION: Patient was modified independent using a single point cane for functional mobility. HOME SUPPORT PRIOR TO ADMISSION: The patient lived alone with limited local support. Physical Therapy Goals  Initiated 3/1/2022  1. Patient will move from supine to sit and sit to supine  in bed with supervision/set-up within 7 day(s). 2.  Patient will transfer from bed to chair and chair to bed with supervision/set-up using the least restrictive device within 7 day(s). 3.  Patient will perform sit to stand with supervision/set-up within 7 day(s). 4.  Patient will ambulate with supervision/set-up for 75 feet with the least restrictive device within 7 day(s). 5.  Patient will ascend/descend 2 stairs with 1 handrail(s) with supervision/set-up within 7 day(s). Outcome: Progressing Towards Goal   PHYSICAL THERAPY TREATMENT  Patient: Luan Monet (22 y.o. female)  Date: 3/3/2022  Diagnosis: Dizziness [R42] <principal problem not specified>       Precautions: Fall,Bed Alarm  Chart, physical therapy assessment, plan of care and goals were reviewed. ASSESSMENT  Patient continues with skilled PT services and is progressing towards goals. Pt presents with impaired balance, unsteady gait, ataxia, L sided weakness, dizziness, impaired cognition, and decreased activity tolerance. Pt required modA for sit to stand and Rony with RW to ambulate. Pt ambulated 15ft and demonstrated slow, shuffled gait pattern and increased trunk sway. Pt required maximal encouragement to participate with therapy and aired many grievances requiring redirection to attend to current task. Prior to mobility SBP was in the 140s and following mobility SBP decreased to 102. Pt left in chair with all needs met. Recommending IPR upon discharge.      Current Level of Function Impacting Discharge (mobility/balance): Rony with RW short distances     Other factors to consider for discharge: modified independent with cane, lives alone, complex pmh, fall risk, MRI positive for L cerebellar stroke         PLAN :  Patient continues to benefit from skilled intervention to address the above impairments. Continue treatment per established plan of care. to address goals. Recommendation for discharge: (in order for the patient to meet his/her long term goals)  Therapy 3 hours per day 5-7 days per week  If unable, then SNF    This discharge recommendation:  Has not yet been discussed the attending provider and/or case management    IF patient discharges home will need the following DME: to be determined (TBD)       SUBJECTIVE:   Patient stated I don't consider this walking.     OBJECTIVE DATA SUMMARY:   Critical Behavior:  Neurologic State: Alert  Orientation Level: Oriented X4  Cognition: Decreased attention/concentration,Impaired decision making  Safety/Judgement: Decreased insight into deficits  Functional Mobility Training:  Bed Mobility:     Supine to Sit:  (pt recieved in chair )              Transfers:  Sit to Stand:  Moderate assistance (from chair )  Stand to Sit: Minimum assistance        Bed to Chair: Moderate assistance                    Balance:  Sitting: Intact  Standing: Impaired  Standing - Static: Fair  Standing - Dynamic : Poor  Ambulation/Gait Training:  Distance (ft): 15 Feet (ft)  Assistive Device: Gait belt;Walker, rolling  Ambulation - Level of Assistance: Minimal assistance;Assist x1        Gait Abnormalities: Decreased step clearance;Shuffling gait;Trunk sway increased              Speed/Zehra: Slow;Shuffled  Step Length: Right shortened;Left shortened  Swing Pattern: Right asymmetrical;Left asymmetrical    Activity Tolerance:   Poor    After treatment patient left in no apparent distress:   Sitting in chair, Call bell within reach, and Bed / chair alarm activated    COMMUNICATION/COLLABORATION:   The patients plan of care was discussed with: Occupational therapist and Registered nurse. ELLIOTT Delaney   Time Calculation: 17 mins        Regarding student involvement in patient care:  A student participated in this treatment session. Per CMS Medicare statements and APTA guidelines I certify that the following was true:  1. I was present and directly observed the entire session. 2. I made all skilled judgments and clinical decisions regarding care. 3. I am the practitioner responsible for assessment, treatment, and documentation.

## 2022-03-03 NOTE — PROGRESS NOTES
Problem: Motor Speech Impaired (Adult)  Goal: *Acute Goals and Plan of Care (Insert Text)  Description: Speech Pathology Goals  Initiated 3/1/2022  1. Patient will participate in further evaluation of motor speech function if symptoms persist or worsen within 7 days. Outcome: Progressing Towards Goal   SPEECH LANGUAGE PATHOLOGY TREATMENT  Patient: Shira Colin (55 y.o. female)  Date: 3/3/2022  Diagnosis: Dizziness [R42] <principal problem not specified>         ASSESSMENT:  Met with patient. Speech 100% intelligible though mildly distorted. Introduced compensatory strategies for communication. She was concerned that she has lost hearing in her L ear. Apparently had an appointment with ENT, but she missed it due to hospitalization. Notified RN. This may be impacting the accuracy of her speech as well. PLAN:  Patient continues to benefit from skilled intervention to address the above impairments. Continue treatment per established plan of care. Discharge Recommendations: To Be Determined     SUBJECTIVE:   Patient stated I can't hear out of my left ear. OBJECTIVE:   Mental Status:  Neurologic State: Alert,Eyes open spontaneously  Orientation Level: Oriented X4  Cognition: Appropriate for age attention/concentration,Appropriate safety awareness,Follows commands  Perception: Cues to maintain midline in standing (tipping head to L (cervical flexion))  Perseveration: Perseverates during conversation (on being dizzy and on all her medical issues)  Safety/Judgement: Awareness of environment,Decreased awareness of need for safety,Decreased insight into deficits,Fall prevention  Treatment & Interventions:   Motor Speech:      See above                                                              Pain:  Pain Scale 1: Numeric (0 - 10)  Pain Intensity 1: 0       After treatment:   Patient left in no apparent distress in bed, Call bell within reach, and Nursing notified    COMMUNICATION/EDUCATION:   Patient was educated regarding compensatory strategies for speech. She quickly changed the subject. The patient's plan of care including recommendations, planned interventions, and recommended diet changes were discussed with: Registered nurse.      Pamela Melton, SLP  Time Calculation: 14 mins

## 2022-03-03 NOTE — PROGRESS NOTES
Problem: Self Care Deficits Care Plan (Adult)  Goal: *Acute Goals and Plan of Care (Insert Text)  Description: FUNCTIONAL STATUS PRIOR TO ADMISSION: Patient was modified independent using a single point cane for functional mobility. Drives. Walks her dog. HOME SUPPORT: The patient lived alone with no local support per pt, but her friend is caring for her dog. Occupational Therapy Goals  Initiated 3/1/2022   1. Patient will perform grooming standing at sink with contact guard assist within 7 day(s). 2.  Patient will perform lower body dressing with contact guard assist within 7 day(s). 3.  Patient will perform toilet transfers with contact guard assist within 7 day(s). 4.  Patient will perform all aspects of toileting with contact guard assist within 7 day(s). 5.  Patient will participate in upper extremity therapeutic exercise/activities with modified independence within 7 day(s). 6.  Patient will utilize energy conservation techniques during functional activities with verbal and visual cues within 7 day(s). 8.  Patient will improve their LUE Fugl Munoz score by 2 points in prep for ADLs within 7 days. Outcome: Progressing Towards Goal     OCCUPATIONAL THERAPY TREATMENT  Patient: Raheem Alexis (1 y.o. female)  Date: 3/3/2022  Diagnosis: Dizziness [R42] <principal problem not specified>       Precautions: Fall,Bed Alarm  Chart, occupational therapy assessment, plan of care, and goals were reviewed. ASSESSMENT  Patient continues with skilled OT services and is progressing towards goals. Patient remains limited by moderate L ataxia, poor standing balance, poor insight into deficits vs abilities, impaired reasoning, impaired safety awareness, impaired attention task, and vertigo increasing with upright mobility.   Patient required modA to stand from EOB, Rony/ constant steadying to ambulate around bed to toilet using RW, maxA for toilet transfer (raised BSC frame over toilet, heavy initial posterior lean with sit-stand), and Rony for toileting. Patient progressed to perform bowel hygiene seated using RUE. Patient has underlying L knee arthritis also impacting performance. Patient remains significantly below independent functional baseline s/p acute CVA and would benefit from inpatient rehab at d/c. Current Level of Function Impacting Discharge (ADLs): Overall set-up to moderate assistance UB ADLs and minimum to maximum assistance LB ADLs         PLAN :  Patient continues to benefit from skilled intervention to address the above impairments. Continue treatment per established plan of care to address goals. Recommendation for discharge: (in order for the patient to meet his/her long term goals)  Therapy 3 hours per day 5-7 days per week    This discharge recommendation:  Has been made in collaboration with the attending provider and/or case management         SUBJECTIVE:   Patient stated I am so dizzy. Everything is spinning.     OBJECTIVE DATA SUMMARY:   Cognitive/Behavioral Status:  Neurologic State: Alert  Orientation Level: Oriented X4  Cognition: Decreased attention/concentration; Impaired decision making        Safety/Judgement: Decreased insight into deficits    Functional Mobility and Transfers for ADLs:  Bed Mobility:  Supine to Sit: Minimum assistance    Transfers:  Sit to Stand: Moderate assistance;Maximum assistance (modA from EOB, maxA from toilet)  Functional Transfers  Toilet Transfer : Maximum assistance (maxA to stand from [facility] frame over toilet, poor balance)  Bed to Chair: Moderate assistance    Balance:  Sitting: Intact  Standing: Impaired; With support  Standing - Static: Fair  Standing - Dynamic : Poor    ADL Intervention:       Grooming  Washing Hands: Set-up (using wipe, seated)       Toileting  Toileting Assistance: Minimum assistance (performed bowel hygiene, steadying for clothing)    Cognitive Retraining  Safety/Judgement: Decreased insight into deficits        Pain:  Patient did not report pain    Activity Tolerance:   Fair    After treatment patient left in no apparent distress:   Sitting in chair, Call bell within reach, and Bed / chair alarm activated    COMMUNICATION/COLLABORATION:   The patients plan of care was discussed with: Physical therapist and Registered nurse. Patient was educated regarding Her deficit(s) of ataxia, vertigo, and impaired balance as this relates to Her diagnosis of CVA. She demonstrated Fair understanding as evidenced by verbal response. Patient and/or family was verbally educated on the BE FAST acronym for signs/symptoms of CVA and TIA. BE FAST was written on patient's communication board  for visual education and reinforcement. All questions answered with patient indicating fair understanding.      Pricilla Lagunas OT  Time Calculation: 32 mins

## 2022-03-03 NOTE — PROGRESS NOTES
[facility]                                                                                          Hospitalist Progress Note  Media Day,  service: [phone] from in house phone        Date of Service:  3/3/2022  NAME:  Shellie Arce  :  1950  MRN:  [id number]      Admission Summary:   Patient presented to the ER with dizziness, reports her symptoms started 3 days back, reports that she feels dizzy and lightheaded, feels as if she was in a fall, came to the ER today, deemed as a code stroke and was requested to be admitted to the hospital service, patient reports that she has had off-and-on dizziness in the past but is not able to elaborate the same, patient denies any other complaints or problem     The patient denies any headache, blurry vision, sore throat, trouble swallowing, trouble with speech, chest pain, SOB, cough, fever, chills, N/V/D, abd pain, urinary symptoms, constipation, recent travels, sick contacts, , falls, injuries, rashes, contact with COVID-19 diagnosed patients, hematemesis, melena, hemoptysis, hematuria, rashes, denies starting any new medications and denies any other concerns or problems besides as mentioned above.            Interval history / Subjective:   Patient was seen and examined this morning. Patient denies any new complaints. Patient denies fever, chills, shortness of breath, chest pain, abdominal pain, nausea, vomiting, and diarrhea. No overnight events reported by nursing staff. Patient reports continued dizziness. Discussed with the patient at bedside today.          Assessment & Plan:     -Acute infarct in the left middle cerebellar peduncle  --Antiplatelet therapy with plavix  -Statin therapy  -Echocardiogram reviewed  -Carotid ultrasound with mild stenosis in the right internal carotid artery  --PT/OT, will need rehab  --Neurology following, appreciate recommendations    --Dizziness, will continue with meclizine    Accelerated hypertension-improved  --Toprol 50 mg daily, continue hydralazine 50 mg 3 times daily, clonidine 0.1 mg twice daily  --We will continue to monitor closely    ALEJANDRO on CKD 4  --Gentle fluid hydration with LR  --Nephrology consulted, appreciate recommendations  --Ultrasound ordered      Type 2 diabetes  --Sliding scale Humalog  --Increase Lantus to 8 units daily  --Patient will continue with her oral diabetic medications upon discharge           Code status:  Full code  Prophylaxis: Heparin  Care Plan discussed with: Patient/RN/CM  Anticipated Disposition: Hopefully tomorrow, case management working on placement     Hospital Problems  Date Reviewed: 11/18/2020          Codes Class Noted POA    Dizziness ICD-10-CM: R42  ICD-9-CM: 780.4  2/28/2022 Unknown                Review of Systems:   A comprehensive review of systems was negative except for that written in the HPI. Vital Signs:    Last 24hrs VS reviewed since prior progress note. Most recent are:  Visit Vitals  BP (!) 149/77 (BP 1 Location: Left upper arm, BP Patient Position: At rest)   Pulse 71   Temp 98.3 °F (36.8 °C)   Resp 17   Ht 5' 5\" (1.651 m)   Wt 81.9 kg (180 lb 9.6 oz)   SpO2 90%   BMI 30.05 kg/m²       No intake or output data in the 24 hours ending 03/03/22 1356     Physical Examination:     I had a face to face encounter with this patient and independently examined them on 3/3/2022 as outlined below:          Constitutional:  No acute distress, cooperative, pleasant    ENT:  Oral mucosa moist, oropharynx benign. Resp:  CTA bilaterally. No wheezing/rhonchi/rales. No accessory muscle use. CV:  Regular rhythm, normal rate, no murmurs, gallops, rubs    GI:  Soft, non distended, non tender. normoactive bowel sounds, no hepatosplenomegaly     Musculoskeletal:  No edema, warm, 2+ pulses throughout    Neurologic:  Moves all extremities.   AAOx3, CN II-XII reviewed            Data Review:    Review and/or order of clinical lab test      Labs:     Recent Labs     03/03/22  0505 03/01/22 0037   WBC 10.1 9.5   HGB 14.1 15.0   HCT 43.4 45.6    365     Recent Labs     03/03/22  0505 03/01/22 0037 02/28/22 1929    138 136   K 4.6 4.0 3.9   * 112* 109*   CO2 21 22 22   BUN 47* 43* 35*   CREA 2.15* 1.92* 1.99*   * 148* 225*   CA 9.4 9.7 10.0   MG  --  2.2  --    PHOS  --  3.0  --      Recent Labs     03/03/22  0505 02/28/22 1929   ALT 22 23    129*   TBILI 0.7 0.6   TP 6.4 8.2   ALB 3.2* 4.0   GLOB 3.2 4.2*     Recent Labs     03/01/22 0037   INR 1.0   PTP 10.7      No results for input(s): FE, TIBC, PSAT, FERR in the last 72 hours. No results found for: FOL, RBCF   No results for input(s): PH, PCO2, PO2 in the last 72 hours.   Recent Labs     03/01/22  0039 02/28/22 1929    127     Lab Results   Component Value Date/Time    Cholesterol, total 194 03/01/2022 12:37 AM    HDL Cholesterol 53 03/01/2022 12:37 AM    LDL, calculated 117.4 (H) 03/01/2022 12:37 AM    Triglyceride 118 03/01/2022 12:37 AM    CHOL/HDL Ratio 3.7 03/01/2022 12:37 AM     Lab Results   Component Value Date/Time    Glucose (POC) 200 (H) 03/03/2022 11:36 AM    Glucose (POC) 133 (H) 03/03/2022 07:31 AM    Glucose (POC) 125 (H) 03/02/2022 10:17 PM    Glucose (POC) 116 03/02/2022 04:19 PM    Glucose (POC) 213 (H) 03/02/2022 11:28 AM     No results found for: COLOR, APPRN, SPGRU, REFSG, BEATA, PROTU, GLUCU, KETU, BILU, UROU, SNEHAL, LEUKU, GLUKE, EPSU, BACTU, WBCU, RBCU, CASTS, UCRY      Medications Reviewed:     Current Facility-Administered Medications   Medication Dose Route Frequency    lactated Ringers infusion  65 mL/hr IntraVENous CONTINUOUS    [START ON 3/4/2022] insulin glargine (LANTUS) injection 8 Units  8 Units SubCUTAneous DAILY    polyethylene glycol (MIRALAX) packet 17 g  17 g Oral BID    docusate sodium (COLACE) capsule 100 mg  100 mg Oral BID    hydrALAZINE (APRESOLINE) tablet 50 mg  50 mg Oral TID   [facility] meclizine (ANTIVERT) tablet 25 mg  25 mg Oral TID PRN    heparin (porcine) injection 5,000 Units  5,000 Units SubCUTAneous Q8H    clopidogreL (PLAVIX) tablet 75 mg  75 mg Oral DAILY    cloNIDine HCL (CATAPRES) tablet 0.1 mg  0.1 mg Oral BID    [Held by provider] glipiZIDE (GLUCOTROL) tablet 20 mg  20 mg Oral DAILY    [Held by provider] metFORMIN (GLUCOPHAGE) tablet 1,000 mg  1,000 mg Oral BID WITH MEALS    metoprolol succinate (TOPROL-XL) XL tablet 50 mg  50 mg Oral DAILY    famotidine (PEPCID) tablet 20 mg  20 mg Oral DAILY    sertraline (ZOLOFT) tablet 25 mg  25 mg Oral QHS    acetaminophen (TYLENOL) tablet 650 mg  650 mg Oral Q4H PRN    Or    acetaminophen (TYLENOL) solution 650 mg  650 mg Per NG tube Q4H PRN    Or    acetaminophen (TYLENOL) suppository 650 mg  650 mg Rectal Q4H PRN    atorvastatin (LIPITOR) tablet 80 mg  80 mg Oral QHS    hydrALAZINE (APRESOLINE) 20 mg/mL injection 10 mg  10 mg IntraVENous Q6H PRN    insulin lispro (HUMALOG) injection   SubCUTAneous AC&HS    glucose chewable tablet 16 g  4 Tablet Oral PRN    glucagon (GLUCAGEN) injection 1 mg  1 mg IntraMUSCular PRN    dextrose 10 % infusion 0-250 mL  0-250 mL IntraVENous PRN     ______________________________________________________________________  EXPECTED LENGTH OF STAY: - - -  ACTUAL LENGTH OF STAY:          0                 Karlee Taveras DO

## 2022-03-03 NOTE — PROGRESS NOTES
Problem: Falls - Risk of  Goal: *Absence of Falls  Description: Document Cassidy Fishman Fall Risk and appropriate interventions in the flowsheet.   Outcome: Progressing Towards Goal  Note: Fall Risk Interventions:  Mobility Interventions: Patient to call before getting OOB,OT consult for ADLs,PT Consult for mobility concerns,Utilize walker, cane, or other assistive device,Utilize gait belt for transfers/ambulation         Medication Interventions: Evaluate medications/consider consulting pharmacy,Patient to call before getting OOB,Teach patient to arise slowly,Utilize gait belt for transfers/ambulation    Elimination Interventions: Call light in reach,Patient to call for help with toileting needs    History of Falls Interventions: Door open when patient unattended,Investigate reason for fall         Problem: Patient Education: Go to Patient Education Activity  Goal: Patient/Family Education  Outcome: Progressing Towards Goal     Problem: Patient Education: Go to Patient Education Activity  Goal: Patient/Family Education  Outcome: Progressing Towards Goal     Problem: Patient Education: Go to Patient Education Activity  Goal: Patient/Family Education  Outcome: Progressing Towards Goal     Problem: Patient Education: Go to Patient Education Activity  Goal: Patient/Family Education  Outcome: Progressing Towards Goal     Problem: Patient Education: Go to Patient Education Activity  Goal: Patient/Family Education  Outcome: Progressing Towards Goal

## 2022-03-03 NOTE — PROGRESS NOTES
Transition of Care Plan: [facility] accepted, pending bed availability. [facility]-accepted. [facility]-denied.      RUR: N/A     PCP F/U: JOSE Santoro MD     Disposition: [facility]     Transportation: BLS     Main Contact: son Shanice Hong (primary [phone]) and Alisa Angel (ex-spouse [phone])  Kimberlee ALVAREZLenore KWAMZAS-[phone]  [address], Department of D[employer]j-[phone]    0930: [facility] still following. Concerned regarding disposition after rehab and limited support. Ex  provides support and patient also has an outpatient . [facility] states they will discuss with their MD. Referrals sent out to other [facility] in case [facility] denies. [facility] also reviewing. Will continue to follow. 1103: [facility] accepted pending bed availability. Likely Saturday. [facility] reviewing. May be able to accept tomorrow. [facility] Rolando denied referral. Have requested COVID PCR order from attending. 1120: Left message for  Leobardo Salas to give this CM a call. 1140: [facility] states that they have accepted. Will visit with patient. Nadir Tarango RN, CRM    Transition of Care Plan:     The Plan for Transition of Care is related to the following treatment goals: [facility]    The Patient and/or patient representative was provided with a choice of provider and agrees  with the discharge plan. Yes [x] No []    A Freedom of choice list was provided with basic dialogue that supports the patient's individualized plan of care/goals and shares the quality data associated with the providers.        Yes [x] No []

## 2022-03-03 NOTE — CONSULTS
[facility]   [address] [facility], [facility], [facility]  Phone: [phone] NOTE     Patient: Gustavo Mcwilliams MRN: [id number]  PCP: Malka Pastor MD   :     1950  Age:   80 y.o. Sex:  female      Referring physician: En Arteaga DO  Reason for consultation: 80 y.o. female with Dizziness [U11] complicated by ALEJANDRO   Admission Date: 2022 11:54 AM  LOS: 0 days      ASSESSMENT and PLAN :     1 ALEJANDRO/CKD 4   - baseline cr at 1.8  - mild bump in cr   - suspect hypovolumia   - will do renal US and urine studies  - LR for 1 liter         2 Acute Ischemic stroke   - seen by neurology and  Medications adjusted     3 CKD 4   - - from DM + HTN nepherosclerosis   - baseline  Cr 1.8--2       4 HTN   - difficult to control as she is hesitant to make changes   -on clonidine/hydralzine and metoprolol for now       5 DM type 2   - on ISS   - used to have high hba1c before  - now better         6 Psychosocial issues  - consider psychiatry consult        7 thanks for the consult     Care Plan discussed with:  Pt         Thank you for consulting [facility] in the care of your patient. Subjective:   HPI: Gustavo Mcwilliams is a 80 y.o.  female who has been admitted to the hospital for dizziness. She had H/o CKD 4 from DM+HTN+ HLD/. Had significant DM before with poor Hba1c control. She presented with dizziness and was admitted and seen by neurology. She had work up done [facility] showed a acute infarct in her mRI . Seen by neurology and meds has been adjusted. She has CKD 4 and followed by me in office. :Last cr was 1.8/GFR 27 in our office Nep consulted for ALEJANDRO and hence this consultation is being done.      Past Medical Hx:   Past Medical History:   Diagnosis Date    Arthritis     Chronic pain     Diabetes ([address]     GERD (gastroesophageal reflux disease)     Hypertension     Ill-defined condition     ucerative colitis    Ulcerative colitis [facility]     Vertigo         Past Surgical Hx:     Past Surgical History:   Procedure Laterality Date    HX GI      colonoscopy    HX ORTHOPAEDIC Right 2014    FOOT    HX OTHER SURGICAL      finger surgery, foot surgery         No Known Allergies    Social Hx:  reports that she has never smoked. She has never used smokeless tobacco. She reports that she does not drink alcohol and does not use drugs. Family History   Problem Relation Age of Onset    Heart Disease Mother     Diabetes Mother     Diabetes Father     Hearing Impairment Brother        Review of Systems:  A thorough twelve point review of system was performed today. Pertinent positives and negatives are mentioned in the HPI. The reminder of the ROS is negative and noncontributory. Objective:    Vitals:    Vitals:    03/03/22 0552 03/03/22 0557 03/03/22 0801 03/03/22 0952   BP:  139/73  (!) 149/77   Pulse: 64 62 62 66   Resp:  18  17   Temp:  98 °F (36.7 °C)  98.3 °F (36.8 °C)   SpO2:    90%   Weight:       Height:         I&O's:  No intake/output data recorded. Visit Vitals  BP (!) 149/77 (BP 1 Location: Left upper arm, BP Patient Position: At rest)   Pulse 66   Temp 98.3 °F (36.8 °C)   Resp 17   Ht 5' 5\" (1.651 m)   Wt 81.9 kg (180 lb 9.6 oz)   SpO2 90%   BMI 30.05 kg/m²       Physical Exam:  General:  No apparent Distress  HEENT: PERRL,  No Pallor , No Icterus  Neck: Supple,no mass palpable  Lungs : CTA  CVS: RRR, S1 S2 normal, No murmur   Abdomen: Soft, NT, BS +  Extremities:  No Edema  Skin: No rash or lesions. MS: No joint swelling, erythema, warmth  Neurologic: non focal, AAO x 3  Psych: normal affect.  Rambling with her h/o  Disjointed thoughts   Laboratory Results:    Recent Labs     03/03/22  0505 03/01/22  0037 02/28/22  1929 02/28/22  1227 02/28/22  1227    138 136   < > 139   K 4.6 4.0 3.9   < > 3.9   * 112* 109*   < > 110*   CO2 21 22 22   < > 24   * 148* 225*   < > 131*   BUN 47* 43* 35*   < > 36*   CREA 2.15* 1.92* 1.99*   < > 1.93*   CA 9.4 9.7 10.0   < > 10.2*   MG  --  2.2  --   --   --    PHOS  --  3.0  --   --   --    ALB 3.2*  --  4.0  --  4.2   ALT 22  --  23  --  22   INR  --  1.0  --   --  1.0    < > = values in this interval not displayed. Recent Labs     03/03/22  0505 03/01/22  0037 02/28/22  1227   WBC 10.1 9.5 8.9   HGB 14.1 15.0 16.7*   HCT 43.4 45.6 51.3*    365 377     No results found for: SDES  Lab Results   Component Value Date/Time    Culture result: (A) 06/07/2017 11:25 AM     MRSA NOT PRESENT. Apparent Staphylococus aureus (not MRSA noted). Culture result:  06/07/2017 11:25 AM         Screening of patient nares for MRSA is for surveillance purposes and, if positive, to facilitate isolation considerations in high risk settings. It is not intended for automatic decolonization interventions per se as regimens are not sufficiently effective to warrant routine use.      Recent Results (from the past 24 hour(s))   GLUCOSE, POC    Collection Time: 03/02/22  4:19 PM   Result Value Ref Range    Glucose (POC) 116 65 - 117 mg/dL    Performed by Radha Padilla    GLUCOSE, POC    Collection Time: 03/02/22 10:17 PM   Result Value Ref Range    Glucose (POC) 125 (H) 65 - 117 mg/dL    Performed by Austen Burnham (Trvlr)    CBC W/O DIFF    Collection Time: 03/03/22  5:05 AM   Result Value Ref Range    WBC 10.1 3.6 - 11.0 K/uL    RBC 4.78 3.80 - 5.20 M/uL    HGB 14.1 11.5 - 16.0 g/dL    HCT 43.4 35.0 - 47.0 %    MCV 90.8 80.0 - 99.0 FL    MCH 29.5 26.0 - 34.0 PG    MCHC 32.5 30.0 - 36.5 g/dL    RDW 14.3 11.5 - 14.5 %    PLATELET 983 816 - 792 K/uL    MPV 9.8 8.9 - 12.9 FL    NRBC 0.0 0  WBC    ABSOLUTE NRBC 0.00 0.00 - 2.43 K/uL   METABOLIC PANEL, COMPREHENSIVE    Collection Time: 03/03/22  5:05 AM   Result Value Ref Range    Sodium 139 136 - 145 mmol/L    Potassium 4.6 3.5 - 5.1 mmol/L    Chloride 112 (H) 97 - 108 mmol/L    CO2 21 21 - 32 mmol/L    Anion gap 6 5 - 15 mmol/L    Glucose 139 (H) 65 - 100 mg/dL    BUN 47 (H) 6 - 20 MG/DL    Creatinine 2.15 (H) 0.55 - 1.02 MG/DL    BUN/Creatinine ratio 22 (H) 12 - 20      GFR est AA 27 (L) >60 ml/min/1.73m2    GFR est non-AA 23 (L) >60 ml/min/1.73m2    Calcium 9.4 8.5 - 10.1 MG/DL    Bilirubin, total 0.7 0.2 - 1.0 MG/DL    ALT (SGPT) 22 12 - 78 U/L    AST (SGOT) 12 (L) 15 - 37 U/L    Alk. phosphatase 108 45 - 117 U/L    Protein, total 6.4 6.4 - 8.2 g/dL    Albumin 3.2 (L) 3.5 - 5.0 g/dL    Globulin 3.2 2.0 - 4.0 g/dL    A-G Ratio 1.0 (L) 1.1 - 2.2     GLUCOSE, POC    Collection Time: 03/03/22  7:31 AM   Result Value Ref Range    Glucose (POC) 133 (H) 65 - 117 mg/dL    Performed by Abran Rivera (Trvlr)    GLUCOSE, POC    Collection Time: 03/03/22 11:36 AM   Result Value Ref Range    Glucose (POC) 200 (H) 65 - 117 mg/dL    Performed by Chun CASTELLON          Urine dipstick:   No results found for: COLOR, APPRN, SPGRU, REFSG, BEATA, PROTU, GLUCU, KETU, BILU, UROU, SNEHAL, LEUKU, GLUKE, EPSU, BACTU, WBCU, RBCU, CASTS, UCRY    I have reviewed the following: All pertinent labs, microbiology data, radiology imaging for my assessment     Medications list Personally Reviewed   [x]      Yes     []               No       Medications:  Prior to Admission medications    Medication Sig Start Date End Date Taking? Authorizing Provider   aspirin 81 mg chewable tablet Take 81 mg by mouth daily. Yes Provider, Historical   cloNIDine HCl (CATAPRES) 0.1 mg tablet Take 0.1 mg by mouth two (2) times a day. Yes Provider, Historical   dulaglutide (Trulicity) 1.5 RM/2.1 mL sub-q pen 1.5 mg by SubCUTAneous route every seven (7) days. Provider, Historical   hydrALAZINE (APRESOLINE) 25 mg tablet Take 25 mg by mouth three (3) times daily. Provider, Historical   sertraline (ZOLOFT) 25 mg tablet Take 1 Tab by mouth nightly.   Patient not taking: Reported on 3/1/2022 11/10/20   Shivam Li DO   acetaminophen (TYLENOL) 325 mg tablet Take 2 Tabs by mouth every six (6) hours as needed for Pain. Patient not taking: Reported on 3/1/2022 3/16/19   Aydin Weinberg MD   oxyCODONE-acetaminophen (PERCOCET 10)  mg per tablet Take 1 Tab by mouth every four (4) hours as needed. Max Daily Amount: 6 Tabs. Patient not taking: Reported on 3/1/2022 6/22/17   Jackeline Casarez NP   polyethylene glycol [facility]) 17 gram packet Take 1 Packet by mouth daily. Patient not taking: Reported on 3/1/2022 6/22/17   Jackeline Casarez NP   senna-docusate (PERICOLACE) 8.6-50 mg per tablet Take 1 Tab by mouth daily. Patient not taking: Reported on 3/1/2022 6/22/17   Jackeline Casarez NP   glipiZIDE (GLUCOTROL) 10 mg tablet Take 20 mg by mouth daily. Provider, Historical   metoprolol succinate (TOPROL-XL) 50 mg XL tablet Take 50 mg by mouth daily. Patient not taking: Reported on 3/1/2022    Provider, Historical   losartan-hydroCHLOROthiazide (HYZAAR) 100-25 mg per tablet Take 1 Tab by mouth daily. Patient not taking: Reported on 3/1/2022    Provider, Historical   raNITIdine (ZANTAC) 150 mg tablet Take 150 mg by mouth daily. Patient not taking: Reported on 3/1/2022    Provider, Historical   metFORMIN (GLUCOPHAGE) 1,000 mg tablet Take 1,000 mg by mouth two (2) times daily (with meals). Patient not taking: Reported on 3/1/2022    Lucho Isabel MD        Thank you for allowing us to participate in the care of this patient. We will follow patient. Please dont hesitate to call with any questions    Divya Shelton MD  [facility] [address]  Phone - [phone]   Fax - [phone]  www. [facility]

## 2022-03-04 VITALS
DIASTOLIC BLOOD PRESSURE: 81 MMHG | OXYGEN SATURATION: 91 % | SYSTOLIC BLOOD PRESSURE: 147 MMHG | BODY MASS INDEX: 30.09 KG/M2 | HEIGHT: 65 IN | RESPIRATION RATE: 13 BRPM | WEIGHT: 180.6 LBS | TEMPERATURE: 98.4 F | HEART RATE: 70 BPM

## 2022-03-04 PROBLEM — N17.9 AKI (ACUTE KIDNEY INJURY) (HCC): Status: ACTIVE | Noted: 2022-03-04

## 2022-03-04 PROBLEM — I63.9 CVA (CEREBRAL VASCULAR ACCIDENT) (HCC): Status: ACTIVE | Noted: 2022-03-04

## 2022-03-04 LAB
ANION GAP SERPL CALC-SCNC: 5 MMOL/L (ref 5–15)
BUN SERPL-MCNC: 45 MG/DL (ref 6–20)
BUN/CREAT SERPL: 22 (ref 12–20)
CALCIUM SERPL-MCNC: 9.3 MG/DL (ref 8.5–10.1)
CHLORIDE SERPL-SCNC: 109 MMOL/L (ref 97–108)
CO2 SERPL-SCNC: 22 MMOL/L (ref 21–32)
CREAT SERPL-MCNC: 2.08 MG/DL (ref 0.55–1.02)
GLUCOSE BLD STRIP.AUTO-MCNC: 139 MG/DL (ref 65–117)
GLUCOSE BLD STRIP.AUTO-MCNC: 203 MG/DL (ref 65–117)
GLUCOSE SERPL-MCNC: 127 MG/DL (ref 65–100)
POTASSIUM SERPL-SCNC: 4.6 MMOL/L (ref 3.5–5.1)
SARS-COV-2, XPLCVT: NOT DETECTED
SERVICE CMNT-IMP: ABNORMAL
SERVICE CMNT-IMP: ABNORMAL
SODIUM SERPL-SCNC: 136 MMOL/L (ref 136–145)
SOURCE, COVRS: NORMAL

## 2022-03-04 PROCEDURE — 74011250637 HC RX REV CODE- 250/637: Performed by: STUDENT IN AN ORGANIZED HEALTH CARE EDUCATION/TRAINING PROGRAM

## 2022-03-04 PROCEDURE — 65270000029 HC RM PRIVATE

## 2022-03-04 PROCEDURE — 36415 COLL VENOUS BLD VENIPUNCTURE: CPT

## 2022-03-04 PROCEDURE — 74011250636 HC RX REV CODE- 250/636: Performed by: INTERNAL MEDICINE

## 2022-03-04 PROCEDURE — 74011250637 HC RX REV CODE- 250/637: Performed by: FAMILY MEDICINE

## 2022-03-04 PROCEDURE — 74011250637 HC RX REV CODE- 250/637: Performed by: PSYCHIATRY & NEUROLOGY

## 2022-03-04 PROCEDURE — 74011250636 HC RX REV CODE- 250/636: Performed by: STUDENT IN AN ORGANIZED HEALTH CARE EDUCATION/TRAINING PROGRAM

## 2022-03-04 PROCEDURE — 80048 BASIC METABOLIC PNL TOTAL CA: CPT

## 2022-03-04 PROCEDURE — 82962 GLUCOSE BLOOD TEST: CPT

## 2022-03-04 PROCEDURE — 96372 THER/PROPH/DIAG INJ SC/IM: CPT

## 2022-03-04 PROCEDURE — 92507 TX SP LANG VOICE COMM INDIV: CPT

## 2022-03-04 PROCEDURE — G0378 HOSPITAL OBSERVATION PER HR: HCPCS

## 2022-03-04 RX ORDER — MECLIZINE HYDROCHLORIDE 25 MG/1
25 TABLET ORAL
Qty: 1 TABLET | Refills: 0 | Status: SHIPPED
Start: 2022-03-04 | End: 2022-03-14

## 2022-03-04 RX ORDER — ATORVASTATIN CALCIUM 80 MG/1
80 TABLET, FILM COATED ORAL
Qty: 1 TABLET | Refills: 0 | Status: SHIPPED
Start: 2022-03-04

## 2022-03-04 RX ORDER — HEPARIN SODIUM 5000 [USP'U]/ML
5000 INJECTION, SOLUTION INTRAVENOUS; SUBCUTANEOUS EVERY 8 HOURS
Qty: 1 ML | Refills: 0 | Status: SHIPPED
Start: 2022-03-04

## 2022-03-04 RX ORDER — CLOPIDOGREL BISULFATE 75 MG/1
75 TABLET ORAL DAILY
Qty: 1 TABLET | Refills: 0 | Status: SHIPPED
Start: 2022-03-05

## 2022-03-04 RX ORDER — INSULIN LISPRO 100 [IU]/ML
INJECTION, SOLUTION INTRAVENOUS; SUBCUTANEOUS
Qty: 1 EACH | Refills: 0 | Status: SHIPPED
Start: 2022-03-04

## 2022-03-04 RX ORDER — HYDRALAZINE HYDROCHLORIDE 50 MG/1
50 TABLET, FILM COATED ORAL 3 TIMES DAILY
Qty: 1 TABLET | Refills: 0 | Status: SHIPPED
Start: 2022-03-04

## 2022-03-04 RX ORDER — FAMOTIDINE 20 MG/1
20 TABLET, FILM COATED ORAL DAILY
Qty: 1 TABLET | Refills: 0 | Status: SHIPPED
Start: 2022-03-05

## 2022-03-04 RX ORDER — ASPIRIN 81 MG/1
81 TABLET ORAL DAILY
Status: DISCONTINUED | OUTPATIENT
Start: 2022-03-05 | End: 2022-03-04 | Stop reason: HOSPADM

## 2022-03-04 RX ORDER — INSULIN GLARGINE 100 [IU]/ML
15 INJECTION, SOLUTION SUBCUTANEOUS DAILY
Qty: 1 ML | Refills: 0 | Status: SHIPPED
Start: 2022-03-04

## 2022-03-04 RX ADMIN — FAMOTIDINE 20 MG: 20 TABLET, FILM COATED ORAL at 09:13

## 2022-03-04 RX ADMIN — POLYETHYLENE GLYCOL 3350 17 G: 17 POWDER, FOR SOLUTION ORAL at 09:13

## 2022-03-04 RX ADMIN — METOPROLOL SUCCINATE 50 MG: 50 TABLET, EXTENDED RELEASE ORAL at 09:13

## 2022-03-04 RX ADMIN — HEPARIN SODIUM 5000 UNITS: 5000 INJECTION INTRAVENOUS; SUBCUTANEOUS at 05:39

## 2022-03-04 RX ADMIN — SODIUM CHLORIDE, POTASSIUM CHLORIDE, SODIUM LACTATE AND CALCIUM CHLORIDE 65 ML/HR: 600; 310; 30; 20 INJECTION, SOLUTION INTRAVENOUS at 02:01

## 2022-03-04 RX ADMIN — DOCUSATE SODIUM 100 MG: 100 CAPSULE, LIQUID FILLED ORAL at 09:12

## 2022-03-04 RX ADMIN — HYDRALAZINE HYDROCHLORIDE 50 MG: 50 TABLET, FILM COATED ORAL at 09:12

## 2022-03-04 RX ADMIN — CLOPIDOGREL 75 MG: 75 TABLET, FILM COATED ORAL at 09:13

## 2022-03-04 RX ADMIN — CLONIDINE HYDROCHLORIDE 0.1 MG: 0.1 TABLET ORAL at 09:58

## 2022-03-04 RX ADMIN — HEPARIN SODIUM 5000 UNITS: 5000 INJECTION INTRAVENOUS; SUBCUTANEOUS at 13:40

## 2022-03-04 NOTE — PROGRESS NOTES
Problem: Motor Speech Impaired (Adult)  Goal: *Acute Goals and Plan of Care (Insert Text)  Description: Speech Pathology Goals  Initiated 3/1/2022  1. Patient will participate in further evaluation of motor speech function if symptoms persist or worsen within 7 days. Outcome: Progressing Towards Goal     SPEECH LANGUAGE PATHOLOGY TREATMENT  Patient: Tegan Zamorano (14 y.o. female)  Date: 3/4/2022  Diagnosis: Dizziness [R42]  ALEJANDRO (acute kidney injury) ([facility] [address] [N17.9]  CVA (cerebral vascular accident) ([facility] [address] [I63.9] <principal problem not specified>         ASSESSMENT:  Pt sitting upright and attempting to brush her teeth when SLP arrived. Pt denies any pain and very upset regarding her loss of hearing in the left ear. Pt assisted with getting water to brush her teeth. Pt agreeable to session. Pt is considered 100% intelligible in conversational speech with mild intermittent distortions only that do not impact overall intelligibility. Pt could not recall speech compensatory strategies given yesterday. Educated and practiced slow and deliberate speech to assist when other are having difficulty understanding her speech with mild appreciation due to perseverating on her hearing and minimal recovery since stroke despite only being 4 days. Pt is scheduled to discharge today to [address] [place] [address]. PLAN:  Patient continues to benefit from skilled intervention to address the above impairments. Continue treatment per established plan of care. Discharge Recommendations:  Inpatient Rehab     SUBJECTIVE:   Patient stated I can't brush my teeth, I don't know how.     OBJECTIVE:   Mental Status:  Neurologic State: Eyes open spontaneously  Orientation Level: Oriented X4  Cognition: Follows commands  Perception: Cues to maintain midline in standing (tipping head to L (cervical flexion))  Perseveration: Perseverates during conversation (on being dizzy and on all her medical issues)  Safety/Judgement: Decreased insight into deficits  Treatment & Interventions: Motor Speech:     Intelligibility: No impairment                    Apraxic Characteristics: None  Dysarthric Characteristics: Imprecise     Language Comprehension and Expression:  Auditory Comprehension   Hearing Aid: At bedside;Bilateral  Verbal Expression         After treatment:   Call bell within reach and Nursing notified    COMMUNICATION/EDUCATION:   Patient was educated regarding her deficit(s) of speech intelligibility as this relates to her diagnosis of left cerebellar peduncle ischemic infarct. She demonstrated Fair understanding as evidenced by verbal understanding. The patient's plan of care including recommendations, planned interventions, and recommended diet changes were discussed with: Registered nurse.      Saul Wakefield, SLP  Time Calculation: 11 mins

## 2022-03-04 NOTE — PROGRESS NOTES
Bedside and Verbal shift change report given to [facility] (oncoming nurse) by Reza Cohn and Maria D Bejarano RN (offgoing nurse). Report included the following information SBAR, Kardex and MAR.

## 2022-03-04 NOTE — PROGRESS NOTES
TRANSFER - OUT REPORT:    Verbal report given to [facility] (name) on Luan Monet  being transferred to [address] [place] [address] (unit) for routine progression of care       Report consisted of patients Situation, Background, Assessment and   Recommendations(SBAR). Information from the following report(s) SBAR, Kardex, MAR, Cardiac Rhythm NSR and Dual Neuro Assessment was reviewed with the receiving nurse. Lines:   Peripheral IV 03/03/22 Distal;Left;Posterior Forearm (Active)   Site Assessment Clean, dry, & intact 03/04/22 0832   Phlebitis Assessment 0 03/04/22 0832   Infiltration Assessment 0 03/04/22 0800   Dressing Status Clean, dry, & intact 03/04/22 0832   Dressing Type Tape;Transparent 03/04/22 0800   Hub Color/Line Status Blue;Capped 03/04/22 0800   Action Taken Open ports on tubing capped 03/04/22 0800   Alcohol Cap Used Yes 03/04/22 0800        Opportunity for questions and clarification was provided.       Patient transported with:   Patient's medications from home

## 2022-03-04 NOTE — DISCHARGE SUMMARY
Discharge Summary       PATIENT ID: Edith Snow  MRN: [id number]   YOB: 1950    DATE OF ADMISSION: 2/28/2022 11:54 AM    DATE OF DISCHARGE: 3/4  PRIMARY CARE PROVIDER: Tish Henry MD     ATTENDING PHYSICIAN: Cayetano Barker MD  DISCHARGING PROVIDER: Anu Hill MD    To contact this individual call [phone] and ask the  to page. If unavailable ask to be transferred the Adult Hospitalist Department. CONSULTATIONS: IP CONSULT TO PRIMARY CARE PROVIDER  IP CONSULT TO NEUROLOGY  ED CONSULT TO [facility]  ED CONSULT TO SENIOR SERVICES CASE MANAGEMENT  IP CONSULT TO NEUROLOGY  IP CONSULT TO NEPHROLOGY    PROCEDURES/SURGERIES: * No surgery found *    DISCHARGE DIAGNOSES:   ADMISSION SUMMARY AND HOSPITAL COURSE:   HPI: Iliana Lundy is a 80 y.o. female who presents with dizziness  History was primarily obtained from the patient, patient is extremely hard of hearing and is a poor historian  Patient presented to the ER with dizziness, reports her symptoms started 3 days back, reports that she feels dizzy and lightheaded, feels as if she was in a fall, came to the ER today, deemed as a code stroke and was requested to be admitted to the hospital service, patient reports that she has had off-and-on dizziness in the past but is not able to elaborate the same, patient denies any other complaints or problem  The patient denies any headache, blurry vision, sore throat, trouble swallowing, trouble with speech, chest pain, SOB, cough, fever, chills, N/V/D, abd pain, urinary symptoms, constipation, recent travels, sick contacts, , falls, injuries, rashes, contact with COVID-19 diagnosed patients, hematemesis, melena, hemoptysis, hematuria, rashes, denies starting any new medications and denies any other concerns or problems besides as mentioned above.  \"    CVA: cerebellar, gait disturbance and dizziness,   Neurologist consulted and deemed to f/u with her [facility] neurologist  -on statin and plavix    Symptomatic dizziness/gait disturbance  -refer to above    ALEJANDRO: improved with gentle ivf hydration  -held home metformin  -need outpatient followup with repeat labs             NOTIFY YOUR PHYSICIAN FOR ANY OF THE FOLLOWING:   Fever over 101 degrees for 24 hours. Chest pain, shortness of breath, fever, chills, nausea, vomiting, diarrhea, change in mentation, falling, weakness, bleeding. Severe pain or pain not relieved by medications, as well as any other signs or symptoms that you may have questions about. FOLLOW UP APPOINTMENTS:    Follow-up Information     Follow up With Specialties Details Why Contact Info    Dori Penn MD [facility]   [address] [facility]  [address] [facility]  [phone]      [facility] Call Call to schedule followup with your [facility] stroke neurologist  [address] [facility]                DIET: carb consistent diabetic diet    ACTIVITY: as tolerated and as accessed by accepting rehab facility      DISCHARGE MEDICATIONS:  Current Discharge Medication List      START taking these medications    Details   famotidine (PEPCID) 20 mg tablet Take 1 Tablet by mouth daily. Qty: 1 Tablet, Refills: 0  Start date: 3/5/2022      atorvastatin (LIPITOR) 80 mg tablet Take 1 Tablet by mouth nightly. Qty: 1 Tablet, Refills: 0  Start date: 3/4/2022      clopidogreL (PLAVIX) 75 mg tab Take 1 Tablet by mouth daily. Qty: 1 Tablet, Refills: 0  Start date: 3/5/2022      heparin sodium,porcine (heparin, porcine,) 5,000 unit/mL injection 1 mL by SubCUTAneous route every eight (8) hours. Qty: 1 mL, Refills: 0  Start date: 3/4/2022      insulin glargine (LANTUS) 100 unit/mL injection 15 Units by SubCUTAneous route daily.   Qty: 1 mL, Refills: 0  Start date: 3/4/2022      insulin lispro (HUMALOG) 100 unit/mL injection Sliding scale  2 units for 200-250;  4 units for 251-300;  6 units for glucose greater than 300  Qty: 1 Each, Refills: 0  Start date: 3/4/2022      meclizine (ANTIVERT) 25 mg tablet Take 1 Tablet by mouth three (3) times daily as needed for Dizziness for up to 10 days. Qty: 1 Tablet, Refills: 0  Start date: 3/4/2022, End date: 3/14/2022         CONTINUE these medications which have CHANGED    Details   hydrALAZINE (APRESOLINE) 50 mg tablet Take 1 Tablet by mouth three (3) times daily. Qty: 1 Tablet, Refills: 0  Start date: 3/4/2022         CONTINUE these medications which have NOT CHANGED    Details   cloNIDine HCl (CATAPRES) 0.1 mg tablet Take 0.1 mg by mouth two (2) times a day. acetaminophen (TYLENOL) 325 mg tablet Take 2 Tabs by mouth every six (6) hours as needed for Pain. Qty: 2 Tab, Refills: 0      polyethylene glycol (MIRALAX) 17 gram packet Take 1 Packet by mouth daily. Qty: 10 Packet, Refills: 0      senna-docusate (PERICOLACE) 8.6-50 mg per tablet Take 1 Tab by mouth daily. Qty: 10 Tab, Refills: 0      metoprolol succinate (TOPROL-XL) 50 mg XL tablet Take 50 mg by mouth daily.          STOP taking these medications       aspirin 81 mg chewable tablet Comments:   Reason for Stopping:         dulaglutide (Trulicity) 1.5 PA/8.2 mL sub-q pen Comments:   Reason for Stopping:         sertraline (ZOLOFT) 25 mg tablet Comments:   Reason for Stopping:         oxyCODONE-acetaminophen (PERCOCET 10)  mg per tablet Comments:   Reason for Stopping:         glipiZIDE (GLUCOTROL) 10 mg tablet Comments:   Reason for Stopping:         losartan-hydroCHLOROthiazide (HYZAAR) 100-25 mg per tablet Comments:   Reason for Stopping:         raNITIdine (ZANTAC) 150 mg tablet Comments:   Reason for Stopping:         metFORMIN (GLUCOPHAGE) 1,000 mg tablet Comments:   Reason for Stopping:               DISPOSITION:    Home With:   OT  PT  Harjit Mclaughlin  RN      x Long term SNF/Inpatient Rehab    Independent/assisted living    Hospice    Other:       PATIENT CONDITION AT DISCHARGE:     Functional status    Poor    x Deconditioned Independent      Cognition   x  Lucid     Forgetful     Dementia      Catheters/lines (plus indication)    Ash     PICC     PEG    x None      Code status   x  Full code     DNR      PHYSICAL EXAMINATION AT DISCHARGE:  General:          Alert, cooperative, no distress, appears stated age. HEENT:           Atraumatic, anicteric sclerae, hearing impairment         Neck:               Supple, symmetrical  Lungs:             Clear to auscultation bilaterally. No Wheezing   Chest wall:      No tenderness  No Accessory muscle use. Heart:              Regular  rhythm,  No  murmur   No edema  Abdomen:        Soft, non-tender. Not distended. Bowel sounds normal  Extremities:     No cyanosis. No clubbing,    Skin:                Not pale. Not Jaundiced   Psych:             Not anxious or agitated.   Neurologic:      Alert, moves all extremities, answers questions appropriately and responds to commands, pt verbalizes chronic dizziness and abnormal gait, asymmetric facial expression      CHRONIC MEDICAL DIAGNOSES:  Problem List as of 3/4/2022 Date Reviewed: 11/18/2020          Codes Class Noted - Resolved    CVA (cerebral vascular accident) [facility]) ICD-10-CM: I63.9  ICD-9-CM: 434.91  3/4/2022 - Present        ALEJANDRO (acute kidney injury) ([facility] [address] ICD-10-CM: N17.9  ICD-9-CM: 584.9  3/4/2022 - Present        Dizziness ICD-10-CM: R42  ICD-9-CM: 780.4  2/28/2022 - Present        Spinal instabilities of lumbar region ICD-10-CM: M53.2X6  ICD-9-CM: 724.9  6/19/2017 - Present              Greater than 30 minutes were spent with the patient on counseling and coordination of care    Signed:   Ginger Desai MD  3/4/2022  11:24 AM

## 2022-03-04 NOTE — PROGRESS NOTES
Problem: Falls - Risk of  Goal: *Absence of Falls  Description: Document Beltran Allen Fall Risk and appropriate interventions in the flowsheet.   Outcome: Progressing Towards Goal  Note: Fall Risk Interventions:  Mobility Interventions: Communicate number of staff needed for ambulation/transfer,Utilize walker, cane, or other assistive device         Medication Interventions: Teach patient to arise slowly,Patient to call before getting OOB    Elimination Interventions: Call light in reach,Patient to call for help with toileting needs    History of Falls Interventions: Door open when patient unattended,Room close to nurse's station         Problem: Patient Education: Go to Patient Education Activity  Goal: Patient/Family Education  Outcome: Progressing Towards Goal     Problem: Patient Education: Go to Patient Education Activity  Goal: Patient/Family Education  Outcome: Progressing Towards Goal     Problem: Patient Education: Go to Patient Education Activity  Goal: Patient/Family Education  Outcome: Progressing Towards Goal

## 2022-03-04 NOTE — PROGRESS NOTES
Transition of Care Plan: AMADO  RN to call report to [phone]     RUR: N/A     PCP F/U: JOSE Santoro MD     Disposition: [facility]-bed 8405     Transportation: [id number]     Main Contact: wesley Mckeon (primary [phone]) and Rabia Petty (ex-spouse [phone])  Kimberlee ALVAREZLenore [phone]  [address] [address]: Spoke with physician. Patient medically ready. Spoke with patient/family on final disposition preference. [facility] pending. Transport requested for after 1400.     1114: [facility] eta 1430. RN and MD notified. 1147: [facility] information received. 1158: Spoke with emergency contact, Rabia Gaitan regarding estimated discharge time. Is going to meet her over at the facility.      Saima Caldwell RN, CRM

## 2022-03-04 NOTE — PROGRESS NOTES
[facility]   [address] [facility], [facility] [address], [facility]  Phone: [phone]   [phone]       Nephrology Progress Note  Shellie Arce     1950     [id number]  Date of Admission : 2/28/2022 03/04/22    CC:  Follow up for Alejandro    Assessment and Plan   1 ALEJANDRO/CKD 4   - baseline cr at 1.8  - cfr better at 2   - suspect hypovolumia   -renal US MRD   - s/p LR for 1 liter            2 Acute Ischemic stroke   - seen by neurology and  Medications adjusted      3 CKD 4   - - from DM + HTN nepherosclerosis   - baseline  Cr 1.8--2         4 HTN   - difficult to control as she is hesitant to make changes   -on clonidine 0.1 bid /hydralzine  50 TID and metoprolol 50 qday for now         5 DM type 2   - on ISS   - used to have high hba1c before  - now better            6 Psychosocial issues  - consider psychiatry consult           7 thanks for the consult         Interval History:  Seen and no new issues. bp good and cr better   Dc today     Review of Systems: A comprehensive review of systems was negative.     Current Medications:   Current Facility-Administered Medications   Medication Dose Route Frequency    [START ON 3/5/2022] aspirin delayed-release tablet 81 mg  81 mg Oral DAILY    lactated Ringers infusion  65 mL/hr IntraVENous CONTINUOUS    insulin glargine (LANTUS) injection 8 Units  8 Units SubCUTAneous DAILY    polyethylene glycol (MIRALAX) packet 17 g  17 g Oral BID    docusate sodium (COLACE) capsule 100 mg  100 mg Oral BID    hydrALAZINE (APRESOLINE) tablet 50 mg  50 mg Oral TID    meclizine (ANTIVERT) tablet 25 mg  25 mg Oral TID PRN    heparin (porcine) injection 5,000 Units  5,000 Units SubCUTAneous Q8H    clopidogreL (PLAVIX) tablet 75 mg  75 mg Oral DAILY    cloNIDine HCL (CATAPRES) tablet 0.1 mg  0.1 mg Oral BID    [Held by provider] glipiZIDE (GLUCOTROL) tablet 20 mg  20 mg Oral DAILY    [Held by provider] metFORMIN (GLUCOPHAGE) tablet 1,000 mg  1,000 mg Oral BID WITH MEALS    metoprolol succinate (TOPROL-XL) XL tablet 50 mg  50 mg Oral DAILY    famotidine (PEPCID) tablet 20 mg  20 mg Oral DAILY    sertraline (ZOLOFT) tablet 25 mg  25 mg Oral QHS    acetaminophen (TYLENOL) tablet 650 mg  650 mg Oral Q4H PRN    Or    acetaminophen (TYLENOL) solution 650 mg  650 mg Per NG tube Q4H PRN    Or    acetaminophen (TYLENOL) suppository 650 mg  650 mg Rectal Q4H PRN    atorvastatin (LIPITOR) tablet 80 mg  80 mg Oral QHS    hydrALAZINE (APRESOLINE) 20 mg/mL injection 10 mg  10 mg IntraVENous Q6H PRN    insulin lispro (HUMALOG) injection   SubCUTAneous AC&HS    glucose chewable tablet 16 g  4 Tablet Oral PRN    glucagon (GLUCAGEN) injection 1 mg  1 mg IntraMUSCular PRN    dextrose 10 % infusion 0-250 mL  0-250 mL IntraVENous PRN      No Known Allergies    Objective:  Vitals:    Vitals:    03/04/22 1000 03/04/22 1049 03/04/22 1200 03/04/22 1400   BP:  (!) 144/38     Pulse: 68 66 69 70   Resp:  17     Temp:  98.3 °F (36.8 °C)     SpO2:       Weight:       Height:         Intake and Output:  No intake/output data recorded. No intake/output data recorded. Physical Examination:  Pt intubated    Yes / No  General: NAD,Conversant   Neck:  Supple, no mass  Resp:  Lungs CTA B/L, no wheezing , normal respiratory effort  CV:  RRR,  no murmur or rub,no LE edema  GI:  Soft, NT, + Bowel sounds, no hepatosplenomegaly  Neurologic:  Non focal  Psych:             AAO x 3 appropriate affect  Skin:  No Rash  :  No hernandez     []    High complexity decision making was performed  []    Patient is at high-risk of decompensation with multiple organ involvement    Lab Data Personally Reviewed: I have reviewed all the pertinent labs, microbiology data and radiology studies during assessment.     Recent Labs     03/04/22  0218 03/03/22  0505    139   K 4.6 4.6   * 112*   CO2 22 21   * 139*   BUN 45* 47*   CREA 2.08* 2.15*   CA 9.3 9.4   ALB  --  3.2*   ALT  --  22     Recent Labs     03/03/22  0505   WBC 10.1   HGB 14.1   HCT 43.4        No results found for: SDES  Lab Results   Component Value Date/Time    Culture result: (A) 06/07/2017 11:25 AM     MRSA NOT PRESENT. Apparent Staphylococus aureus (not MRSA noted). Culture result:  06/07/2017 11:25 AM         Screening of patient nares for MRSA is for surveillance purposes and, if positive, to facilitate isolation considerations in high risk settings. It is not intended for automatic decolonization interventions per se as regimens are not sufficiently effective to warrant routine use.      Recent Results (from the past 24 hour(s))   SARS-COV-2    Collection Time: 03/03/22  3:18 PM   Result Value Ref Range    SARS-CoV-2 Please find results under separate order     SARS-COV-2    Collection Time: 03/03/22  3:18 PM   Result Value Ref Range    Specimen source Nasopharyngeal      SARS-CoV-2 Not detected NOTD     GLUCOSE, POC    Collection Time: 03/03/22  4:41 PM   Result Value Ref Range    Glucose (POC) 144 (H) 65 - 117 mg/dL    Performed by Neal Ugarte, POC    Collection Time: 03/03/22  9:18 PM   Result Value Ref Range    Glucose (POC) 148 (H) 65 - 117 mg/dL    Performed by Sunny Bhat    METABOLIC PANEL, BASIC    Collection Time: 03/04/22  2:18 AM   Result Value Ref Range    Sodium 136 136 - 145 mmol/L    Potassium 4.6 3.5 - 5.1 mmol/L    Chloride 109 (H) 97 - 108 mmol/L    CO2 22 21 - 32 mmol/L    Anion gap 5 5 - 15 mmol/L    Glucose 127 (H) 65 - 100 mg/dL    BUN 45 (H) 6 - 20 MG/DL    Creatinine 2.08 (H) 0.55 - 1.02 MG/DL    BUN/Creatinine ratio 22 (H) 12 - 20      GFR est AA 29 (L) >60 ml/min/1.73m2    GFR est non-AA 24 (L) >60 ml/min/1.73m2    Calcium 9.3 8.5 - 10.1 MG/DL   GLUCOSE, POC    Collection Time: 03/04/22  7:03 AM   Result Value Ref Range    Glucose (POC) 139 (H) 65 - 117 mg/dL    Performed by Sunny Bhat    GLUCOSE, POC    Collection Time: 03/04/22 11:14 AM   Result Value Ref Range    Glucose (POC) 203 (H) 65 - 117 mg/dL    Performed by Brandon Cavazos            Total time spent with patient:  xxx   min. Care Plan discussed with:  Patient     Family      RN      Consulting Physician [address] [facility],         I have reviewed the flowsheets. Chart and Pertinent Notes have been reviewed. No change in PMH ,family and social history from Consult note.       Michaelle Calderon MD

## 2022-03-04 NOTE — PROGRESS NOTES
Bedside RN performed patient education and medication education. Discharge concerns initiated and discussed with patient, including clarification on \"who\" assists the patient at their home and instructions for when the home going patient should call their provider after discharge. Opportunity for questions and clarification was provided. Patient receptive to education: YES  Patient stated: Acceptance  Barriers to Education: None  Diagnosis Education given:  YES    Length of stay: 0  Expected Day of Discharge: 3/4/22   Ask if they have \"Help at Home\" & add to white board?   YES    Education Day #: 4    Medication Education Given:  YES  M in the box Medication name: Plavix, lisinopril    Pt aware of HCAHPS survey: YES          Stroke Education documented in Patient Education: YES  Core Measures Documented in Connect Care:  Risk Factors: YES  Warning signs of stroke: YES  When to Activate 911: YES  Medication Education for Risk Factors: YES  Smoking cessation if applicable: NO  Written Education Given:  YES    Discharge NIH Completed: YES  Score:     BRAINS: YES    Follow Up Appointment Made: NO  Date/Time if applicable:

## 2022-03-18 PROBLEM — N17.9 AKI (ACUTE KIDNEY INJURY) (HCC): Status: ACTIVE | Noted: 2022-03-04

## 2022-03-18 PROBLEM — I63.9 CVA (CEREBRAL VASCULAR ACCIDENT) (HCC): Status: ACTIVE | Noted: 2022-03-04

## 2022-03-19 PROBLEM — M53.2X6 SPINAL INSTABILITIES OF LUMBAR REGION: Status: ACTIVE | Noted: 2017-06-19

## 2022-03-19 PROBLEM — R42 DIZZINESS: Status: ACTIVE | Noted: 2022-02-28

## 2022-03-25 ENCOUNTER — HOSPITAL ENCOUNTER (EMERGENCY)
Age: 72
Discharge: HOME | End: 2022-03-25
Attending: EMERGENCY MEDICINE
Payer: MEDICARE

## 2022-03-25 ENCOUNTER — APPOINTMENT (OUTPATIENT)
Dept: CT IMAGING | Age: 72
End: 2022-03-25
Attending: EMERGENCY MEDICINE
Payer: MEDICARE

## 2022-03-25 ENCOUNTER — APPOINTMENT (OUTPATIENT)
Dept: GENERAL RADIOLOGY | Age: 72
End: 2022-03-25
Attending: EMERGENCY MEDICINE
Payer: MEDICARE

## 2022-03-25 VITALS
WEIGHT: 180 LBS | HEART RATE: 86 BPM | DIASTOLIC BLOOD PRESSURE: 101 MMHG | OXYGEN SATURATION: 93 % | TEMPERATURE: 97.3 F | BODY MASS INDEX: 29.99 KG/M2 | RESPIRATION RATE: 16 BRPM | HEIGHT: 65 IN | SYSTOLIC BLOOD PRESSURE: 191 MMHG

## 2022-03-25 DIAGNOSIS — S81.812A LEG LACERATION, LEFT, INITIAL ENCOUNTER: Primary | ICD-10-CM

## 2022-03-25 PROCEDURE — 70450 CT HEAD/BRAIN W/O DYE: CPT

## 2022-03-25 PROCEDURE — 74011000250 HC RX REV CODE- 250: Performed by: EMERGENCY MEDICINE

## 2022-03-25 PROCEDURE — 99284 EMERGENCY DEPT VISIT MOD MDM: CPT

## 2022-03-25 PROCEDURE — 75810000293 HC SIMP/SUPERF WND  RPR

## 2022-03-25 PROCEDURE — 73590 X-RAY EXAM OF LOWER LEG: CPT

## 2022-03-25 PROCEDURE — 74011250637 HC RX REV CODE- 250/637: Performed by: EMERGENCY MEDICINE

## 2022-03-25 RX ORDER — AMOXICILLIN AND CLAVULANATE POTASSIUM 875; 125 MG/1; MG/1
1 TABLET, FILM COATED ORAL 2 TIMES DAILY
Qty: 14 TABLET | Refills: 0 | Status: SHIPPED | OUTPATIENT
Start: 2022-03-25 | End: 2022-04-01

## 2022-03-25 RX ORDER — ESCITALOPRAM OXALATE 5 MG/1
5 TABLET ORAL DAILY
COMMUNITY

## 2022-03-25 RX ORDER — LIDOCAINE HYDROCHLORIDE AND EPINEPHRINE 10; 10 MG/ML; UG/ML
15 INJECTION, SOLUTION INFILTRATION; PERINEURAL
Status: COMPLETED | OUTPATIENT
Start: 2022-03-25 | End: 2022-03-25

## 2022-03-25 RX ORDER — METOPROLOL TARTRATE 25 MG/1
75 TABLET, FILM COATED ORAL DAILY
COMMUNITY

## 2022-03-25 RX ORDER — AMOXICILLIN AND CLAVULANATE POTASSIUM 875; 125 MG/1; MG/1
1 TABLET, FILM COATED ORAL
Status: COMPLETED | OUTPATIENT
Start: 2022-03-25 | End: 2022-03-25

## 2022-03-25 RX ADMIN — AMOXICILLIN AND CLAVULANATE POTASSIUM 1 TABLET: 875; 125 TABLET, FILM COATED ORAL at 11:15

## 2022-03-25 RX ADMIN — LIDOCAINE HYDROCHLORIDE AND EPINEPHRINE 150 MG: 10; 10 INJECTION, SOLUTION INFILTRATION; PERINEURAL at 10:10

## 2022-03-25 NOTE — ED NOTES
Spoke with nurse at [facility] regarding wound care f/u for pt. Nurse requests order for wound care in prescription form and on discharge instructions so they may arrange for home health care for wound care.

## 2022-03-25 NOTE — ED NOTES
Skin tear to right forearm cleaned with wound cleanser, steri strips x 2 applied, dressed with mepitel and kerlix dressing. Sutured laceration to left lower leg cleaned with wound cleanser, dressed with mepitel and kerlix dressing. No active bleeding at this time. Previous skin tear to right lower leg dressed with mepitel and kerlix dressing.

## 2022-03-25 NOTE — DISCHARGE INSTRUCTIONS
Please have nursing staff evaluate the patient's left leg wound 3 times daily for the next 10 days. Thank you for allowing us to provide you with medical care today. We realize that you have many choices for your emergency care needs. We thank you for choosing [address] [place] [address]. Please choose us in the future for any continued health care needs. The exam and treatment you received in the Emergency Department were for an emergent problem and are not intended as complete care. It is important that you follow up with a doctor, nurse practitioner, or physician's assistant for ongoing care. If your symptoms worsen or you do not improve as expected and you are unable to reach your usual health care provider, you should return to the Emergency Department. We are available 24 hours a day. Please make an appointment with your health care provider(s) for follow up of your Emergency Department visit. Take this sheet with you when you go to your follow-up visit.

## 2022-03-25 NOTE — ED PROVIDER NOTES
80-year-old female with a history of recent stroke presents with a chief complaint of fall and leg pain. Patient was transitioning from bed to wheelchair this morning at her assisted living facility when she fell out of the bed. She denies hitting her head or loss of consciousness. She does complain of leg pain in the area of a large skin tear on the left shin. She denies any other symptoms           Past Medical History:   Diagnosis Date    Arthritis     Chronic pain     Diabetes ([facility] [address]     GERD (gastroesophageal reflux disease)     Hypertension     Ill-defined condition     ucerative colitis    Ulcerative colitis ([facility] [address] [facility]     Vertigo        Past Surgical History:   Procedure Laterality Date    HX GI      colonoscopy    HX ORTHOPAEDIC Right 2014    FOOT    HX OTHER SURGICAL      finger surgery, foot surgery         Family History:   Problem Relation Age of Onset    Heart Disease Mother     Diabetes Mother     Diabetes Father     Hearing Impairment Brother        Social History     Socioeconomic History    Marital status: SINGLE     Spouse name: Not on file    Number of children: Not on file    Years of education: Not on file    Highest education level: Not on file   Occupational History    Not on file   Tobacco Use    Smoking status: Never Smoker    Smokeless tobacco: Never Used   Substance and Sexual Activity    Alcohol use: No    Drug use: No    Sexual activity: Not on file   Other Topics Concern    Not on file   Social History Narrative    Not on file     Social Determinants of Health     Financial Resource Strain:     Difficulty of Paying Living Expenses: Not on file   Food Insecurity:     Worried About Running Out of Food in the Last Year: Not on file    Ember of Food in the Last Year: Not on file   Transportation Needs:     Lack of Transportation (Medical): Not on file    Lack of Transportation (Non-Medical):  Not on file   Physical Activity:     Days of Exercise per Week: Not on file    Minutes of Exercise per Session: Not on file   Stress:     Feeling of Stress : Not on file   Social Connections:     Frequency of Communication with Friends and Family: Not on file    Frequency of Social Gatherings with Friends and Family: Not on file    Attends Pentecostal Services: Not on file    Active Member of Clubs or Organizations: Not on file    Attends Club or Organization Meetings: Not on file    Marital Status: Not on file   Intimate Partner Violence:     Fear of Current or Ex-Partner: Not on file    Emotionally Abused: Not on file    Physically Abused: Not on file    Sexually Abused: Not on file   Housing Stability:     Unable to Pay for Housing in the Last Year: Not on file    Number of [place] in the Last Year: Not on file    Unstable Housing in the Last Year: Not on file         ALLERGIES: Patient has no known allergies. Review of Systems   Constitutional: Negative for fever. HENT: Negative for rhinorrhea. Respiratory: Negative for shortness of breath. Cardiovascular: Negative for chest pain. Gastrointestinal: Negative for abdominal pain. Genitourinary: Negative for dysuria. Musculoskeletal: Negative for back pain. Skin: Positive for wound. Neurological: Negative for headaches. Psychiatric/Behavioral: Negative for confusion. Vitals:    03/25/22 0933   BP: (!) 173/125   Pulse: 86   Resp: 18   Temp: 97.3 °F (36.3 °C)   SpO2: 96%   Weight: 81.6 kg (180 lb)   Height: 5' 5\" (1.651 m)            Physical Exam  Vitals and nursing note reviewed. Constitutional:       General: She is not in acute distress. Appearance: Normal appearance. She is not ill-appearing, toxic-appearing or diaphoretic. HENT:      Head: Normocephalic and atraumatic. Eyes:      Extraocular Movements: Extraocular movements intact. Cardiovascular:      Rate and Rhythm: Normal rate. Pulses: Normal pulses.    Pulmonary:      Effort: Pulmonary effort is normal. No respiratory distress. Abdominal:      General: There is no distension. Musculoskeletal:         General: Normal range of motion. Cervical back: Normal range of motion. Skin:     General: Skin is dry. Comments: Large, deep laceration to the left shin measuring approximately 12 cm in total length. See picture below. Right forearm skin tear. Neurological:      Mental Status: She is alert and oriented to person, place, and time. Psychiatric:         Mood and Affect: Mood normal.                        MDM  Number of Diagnoses or Management Options  Leg laceration, left, initial encounter  Diagnosis management comments:       Plan: CT imaging obtained to rule out traumatic injury. Imaging unremarkable. Wound repaired per procedure note. Of note it was a very difficult repair as the patient's skin was very thin and it tore through on multiple sites. A combination of simple sutures and horizontal mattress sutures was used. Patient provided with prescription for wound care. She will be placed on Augmentin as reports by EMS state that the patient's dog was licking the wound when they showed up. Discussed my clinical impression(s), any labs and/or radiology results with the patient. I answered any questions and addressed any concerns. Discussed the importance of following up with their primary care physician and/or specialist(s). Discussed signs or symptoms that would warrant return back to the ER for further evaluation. The patient is agreeable with discharge. Wound Repair    Date/Time: 3/25/2022 12:39 PM  Performed by: attendingLocation details: left leg  Wound length:12.6 - 20 cm and 7.6 - 12.5 cm (12cm)    Anesthesia:  Local Anesthetic: lidocaine 1% with epinephrine  Foreign bodies: no foreign bodies  Irrigation solution: saline  Irrigation method: jet lavage  Debridement: none  Wound skin closure material used: 3-0 Prolene.   Subcutaneous closure: Vicryl (3-0 Monocryl)  Number of sutures: 17  Suture technique: Simple and horizontal mattress. Approximation: close  Dressing: Nonadhesive dressing. Patient tolerance: patient tolerated the procedure well with no immediate complications  My total time at bedside, performing this procedure was 16-30 minutes.

## 2022-03-25 NOTE — ED NOTES
Pt discharged home to [facility] with Hospital to home service by karyn. Prescriptions and f/u care reviewed with patient and [facility] staff.

## 2022-03-25 NOTE — ED TRIAGE NOTES
Pt presents via EMS from [facility] at [address] [place] [address] after a ground level fall for c/o skin tears to right forearm and left lower leg. Bandage in place by EMS. Pt denies hitting head, LOC. Pt states she was trying to get into her wheelchair from bed.

## 2022-03-25 NOTE — ED NOTES
Spoke with Melvin Nichole at [facility] and discussed f/u instructions for close following by wound care, as well as f/u appointments with ENT and PCP.

## 2023-05-17 RX ORDER — AMOXICILLIN 250 MG
1 CAPSULE ORAL DAILY
COMMUNITY
Start: 2017-06-22

## 2023-05-17 RX ORDER — HEPARIN SODIUM 5000 [USP'U]/ML
5000 INJECTION, SOLUTION INTRAVENOUS; SUBCUTANEOUS EVERY 8 HOURS
COMMUNITY
Start: 2022-03-04

## 2023-05-17 RX ORDER — ESCITALOPRAM OXALATE 5 MG/1
5 TABLET ORAL DAILY
COMMUNITY

## 2023-05-17 RX ORDER — POLYETHYLENE GLYCOL 3350 17 G/17G
17 POWDER, FOR SOLUTION ORAL DAILY
COMMUNITY
Start: 2017-06-22

## 2023-05-17 RX ORDER — ACETAMINOPHEN 325 MG/1
650 TABLET ORAL EVERY 6 HOURS PRN
COMMUNITY
Start: 2019-03-16

## 2023-05-17 RX ORDER — CLONIDINE HYDROCHLORIDE 0.1 MG/1
0.1 TABLET ORAL 2 TIMES DAILY
COMMUNITY

## 2023-05-17 RX ORDER — INSULIN LISPRO 100 [IU]/ML
INJECTION, SOLUTION INTRAVENOUS; SUBCUTANEOUS
COMMUNITY
Start: 2022-03-04

## 2023-05-17 RX ORDER — METOPROLOL SUCCINATE 50 MG/1
50 TABLET, EXTENDED RELEASE ORAL DAILY
COMMUNITY

## 2023-05-17 RX ORDER — ATORVASTATIN CALCIUM 80 MG/1
1 TABLET, FILM COATED ORAL NIGHTLY
COMMUNITY
Start: 2022-03-04

## 2023-05-17 RX ORDER — FAMOTIDINE 20 MG/1
20 TABLET, FILM COATED ORAL DAILY
COMMUNITY
Start: 2022-03-05

## 2023-05-17 RX ORDER — CLOPIDOGREL BISULFATE 75 MG/1
75 TABLET ORAL DAILY
COMMUNITY
Start: 2022-03-05

## 2023-05-17 RX ORDER — INSULIN GLARGINE 100 [IU]/ML
15 INJECTION, SOLUTION SUBCUTANEOUS DAILY
COMMUNITY
Start: 2022-03-04

## 2023-05-17 RX ORDER — HYDRALAZINE HYDROCHLORIDE 50 MG/1
50 TABLET, FILM COATED ORAL 3 TIMES DAILY
COMMUNITY
Start: 2022-03-04

## 2024-09-09 ENCOUNTER — HOSPITAL ENCOUNTER (EMERGENCY)
Facility: HOSPITAL | Age: 74
Discharge: HOME | End: 2024-09-09
Attending: EMERGENCY MEDICINE

## 2024-09-09 VITALS
TEMPERATURE: 98 F | DIASTOLIC BLOOD PRESSURE: 95 MMHG | HEART RATE: 76 BPM | OXYGEN SATURATION: 96 % | HEIGHT: 63 IN | BODY MASS INDEX: 31.89 KG/M2 | SYSTOLIC BLOOD PRESSURE: 148 MMHG | RESPIRATION RATE: 16 BRPM | WEIGHT: 180 LBS

## 2024-09-09 DIAGNOSIS — Z71.1 FEARED CONDITION NOT DEMONSTRATED: Primary | ICD-10-CM

## 2024-09-09 PROCEDURE — 99283 EMERGENCY DEPT VISIT LOW MDM: CPT

## 2024-09-09 ASSESSMENT — PAIN - FUNCTIONAL ASSESSMENT
PAIN_FUNCTIONAL_ASSESSMENT: NONE - DENIES PAIN
PAIN_FUNCTIONAL_ASSESSMENT: NONE - DENIES PAIN

## 2024-09-10 ASSESSMENT — ENCOUNTER SYMPTOMS
FACIAL SWELLING: 0
SORE THROAT: 0
DIARRHEA: 0
EYE PAIN: 0
COUGH: 0
EYE DISCHARGE: 0
NAUSEA: 0
SHORTNESS OF BREATH: 0
CHEST TIGHTNESS: 0
VOMITING: 0
BACK PAIN: 0
ABDOMINAL PAIN: 0

## 2024-12-07 ENCOUNTER — APPOINTMENT (OUTPATIENT)
Facility: HOSPITAL | Age: 74
End: 2024-12-07
Payer: MEDICARE

## 2024-12-07 ENCOUNTER — HOSPITAL ENCOUNTER (INPATIENT)
Facility: HOSPITAL | Age: 74
LOS: 6 days | Discharge: NURSING, REHAB OR LONG-TERM CARE | End: 2024-12-13
Attending: STUDENT IN AN ORGANIZED HEALTH CARE EDUCATION/TRAINING PROGRAM | Admitting: FAMILY MEDICINE
Payer: MEDICARE

## 2024-12-07 DIAGNOSIS — I10 HYPERTENSION, UNSPECIFIED TYPE: ICD-10-CM

## 2024-12-07 DIAGNOSIS — I63.519 CEREBROVASCULAR ACCIDENT (CVA) DUE TO OCCLUSION OF MIDDLE CEREBRAL ARTERY, UNSPECIFIED BLOOD VESSEL LATERALITY (HCC): ICD-10-CM

## 2024-12-07 DIAGNOSIS — R79.89 ELEVATED TROPONIN: ICD-10-CM

## 2024-12-07 DIAGNOSIS — R73.9 HYPERGLYCEMIA: ICD-10-CM

## 2024-12-07 DIAGNOSIS — R41.82 ALTERED MENTAL STATUS, UNSPECIFIED ALTERED MENTAL STATUS TYPE: Primary | ICD-10-CM

## 2024-12-07 PROBLEM — G93.40 ACUTE ENCEPHALOPATHY: Status: ACTIVE | Noted: 2024-12-07

## 2024-12-07 LAB
ALBUMIN SERPL-MCNC: 3.3 G/DL (ref 3.5–5)
ALBUMIN/GLOB SERPL: 0.8 (ref 1.1–2.2)
ALP SERPL-CCNC: 133 U/L (ref 45–117)
ALT SERPL-CCNC: 30 U/L (ref 12–78)
AMMONIA PLAS-SCNC: <10 UMOL/L
AMPHET UR QL SCN: NEGATIVE
ANION GAP BLD CALC-SCNC: 12 (ref 10–20)
ANION GAP SERPL CALC-SCNC: 11 MMOL/L (ref 2–12)
APPEARANCE UR: CLEAR
AST SERPL-CCNC: 24 U/L (ref 15–37)
BACTERIA URNS QL MICRO: NEGATIVE /HPF
BARBITURATES UR QL SCN: NEGATIVE
BASE EXCESS BLD CALC-SCNC: 1.2 MMOL/L
BASOPHILS # BLD: 0.1 K/UL (ref 0–0.1)
BASOPHILS NFR BLD: 1 % (ref 0–1)
BENZODIAZ UR QL: NEGATIVE
BILIRUB SERPL-MCNC: 0.5 MG/DL (ref 0.2–1)
BILIRUB UR QL: NEGATIVE
BUN SERPL-MCNC: 24 MG/DL (ref 6–20)
BUN/CREAT SERPL: 12 (ref 12–20)
CA-I BLD-MCNC: 1.24 MMOL/L (ref 1.15–1.33)
CALCIUM SERPL-MCNC: 9.6 MG/DL (ref 8.5–10.1)
CANNABINOIDS UR QL SCN: NEGATIVE
CHLORIDE BLD-SCNC: 107 MMOL/L (ref 100–111)
CHLORIDE SERPL-SCNC: 105 MMOL/L (ref 97–108)
CHP ED QC CHECK: NORMAL
CO2 BLD-SCNC: 27 MMOL/L (ref 22–29)
CO2 SERPL-SCNC: 27 MMOL/L (ref 21–32)
COCAINE UR QL SCN: NEGATIVE
COLOR UR: ABNORMAL
CREAT SERPL-MCNC: 2.01 MG/DL (ref 0.55–1.02)
CREAT UR-MCNC: 1.5 MG/DL (ref 0.6–1.3)
DIFFERENTIAL METHOD BLD: ABNORMAL
EOSINOPHIL # BLD: 0 K/UL (ref 0–0.4)
EOSINOPHIL NFR BLD: 0 % (ref 0–7)
EPITH CASTS URNS QL MICRO: ABNORMAL /LPF
ERYTHROCYTE [DISTWIDTH] IN BLOOD BY AUTOMATED COUNT: 14.7 % (ref 11.5–14.5)
GLOBULIN SER CALC-MCNC: 3.9 G/DL (ref 2–4)
GLUCOSE BLD STRIP.AUTO-MCNC: 310 MG/DL (ref 65–117)
GLUCOSE BLD STRIP.AUTO-MCNC: 426 MG/DL (ref 74–99)
GLUCOSE BLD-MCNC: 340 MG/DL
GLUCOSE SERPL-MCNC: 379 MG/DL (ref 65–100)
GLUCOSE UR STRIP.AUTO-MCNC: >1000 MG/DL
HCO3 BLDA-SCNC: 27 MMOL/L
HCT VFR BLD AUTO: 49.6 % (ref 35–47)
HGB BLD-MCNC: 16.1 G/DL (ref 11.5–16)
HGB UR QL STRIP: NEGATIVE
IMM GRANULOCYTES # BLD AUTO: 0.1 K/UL (ref 0–0.04)
IMM GRANULOCYTES NFR BLD AUTO: 1 % (ref 0–0.5)
KETONES UR QL STRIP.AUTO: NEGATIVE MG/DL
LACTATE SERPL-SCNC: 1.8 MMOL/L (ref 0.4–2)
LEUKOCYTE ESTERASE UR QL STRIP.AUTO: NEGATIVE
LYMPHOCYTES # BLD: 1.5 K/UL (ref 0.8–3.5)
LYMPHOCYTES NFR BLD: 20 % (ref 12–49)
Lab: NORMAL
MAGNESIUM SERPL-MCNC: 1.9 MG/DL (ref 1.6–2.4)
MCH RBC QN AUTO: 29.5 PG (ref 26–34)
MCHC RBC AUTO-ENTMCNC: 32.5 G/DL (ref 30–36.5)
MCV RBC AUTO: 91 FL (ref 80–99)
METHADONE UR QL: NEGATIVE
MONOCYTES # BLD: 0.8 K/UL (ref 0–1)
MONOCYTES NFR BLD: 10 % (ref 5–13)
NEUTS SEG # BLD: 5.1 K/UL (ref 1.8–8)
NEUTS SEG NFR BLD: 68 % (ref 32–75)
NITRITE UR QL STRIP.AUTO: NEGATIVE
NRBC # BLD: 0 K/UL (ref 0–0.01)
NRBC BLD-RTO: 0 PER 100 WBC
OPIATES UR QL: NEGATIVE
PCO2 BLDV: 46.9 MMHG (ref 41–51)
PCP UR QL: NEGATIVE
PH BLDV: 7.37 (ref 7.32–7.42)
PH UR STRIP: 5.5 (ref 5–8)
PLATELET # BLD AUTO: 343 K/UL (ref 150–400)
PMV BLD AUTO: 9.9 FL (ref 8.9–12.9)
PO2 BLDV: 39 MMHG (ref 25–40)
POTASSIUM BLD-SCNC: 4.2 MMOL/L (ref 3.5–5.5)
POTASSIUM SERPL-SCNC: 4.2 MMOL/L (ref 3.5–5.1)
PROT SERPL-MCNC: 7.2 G/DL (ref 6.4–8.2)
PROT UR STRIP-MCNC: 30 MG/DL
RBC # BLD AUTO: 5.45 M/UL (ref 3.8–5.2)
RBC #/AREA URNS HPF: ABNORMAL /HPF (ref 0–5)
SAO2 % BLD: 72 % (ref 94–98)
SERVICE CMNT-IMP: ABNORMAL
SODIUM BLD-SCNC: 146 MMOL/L (ref 136–145)
SODIUM SERPL-SCNC: 143 MMOL/L (ref 136–145)
SP GR UR REFRACTOMETRY: 1.02 (ref 1–1.03)
SPECIMEN SITE: ABNORMAL
TROPONIN I SERPL HS-MCNC: 316 NG/L (ref 0–51)
TSH SERPL DL<=0.05 MIU/L-ACNC: 0.61 UIU/ML (ref 0.36–3.74)
UROBILINOGEN UR QL STRIP.AUTO: 0.2 EU/DL (ref 0.2–1)
WBC # BLD AUTO: 7.5 K/UL (ref 3.6–11)
WBC URNS QL MICRO: ABNORMAL /HPF (ref 0–4)

## 2024-12-07 PROCEDURE — 83735 ASSAY OF MAGNESIUM: CPT

## 2024-12-07 PROCEDURE — 70450 CT HEAD/BRAIN W/O DYE: CPT

## 2024-12-07 PROCEDURE — 82803 BLOOD GASES ANY COMBINATION: CPT

## 2024-12-07 PROCEDURE — 82947 ASSAY GLUCOSE BLOOD QUANT: CPT

## 2024-12-07 PROCEDURE — 80053 COMPREHEN METABOLIC PANEL: CPT

## 2024-12-07 PROCEDURE — 2060000000 HC ICU INTERMEDIATE R&B

## 2024-12-07 PROCEDURE — 6360000002 HC RX W HCPCS: Performed by: STUDENT IN AN ORGANIZED HEALTH CARE EDUCATION/TRAINING PROGRAM

## 2024-12-07 PROCEDURE — 93005 ELECTROCARDIOGRAM TRACING: CPT | Performed by: STUDENT IN AN ORGANIZED HEALTH CARE EDUCATION/TRAINING PROGRAM

## 2024-12-07 PROCEDURE — 96372 THER/PROPH/DIAG INJ SC/IM: CPT

## 2024-12-07 PROCEDURE — 36415 COLL VENOUS BLD VENIPUNCTURE: CPT

## 2024-12-07 PROCEDURE — 84132 ASSAY OF SERUM POTASSIUM: CPT

## 2024-12-07 PROCEDURE — 87086 URINE CULTURE/COLONY COUNT: CPT

## 2024-12-07 PROCEDURE — 82962 GLUCOSE BLOOD TEST: CPT

## 2024-12-07 PROCEDURE — 82140 ASSAY OF AMMONIA: CPT

## 2024-12-07 PROCEDURE — 82330 ASSAY OF CALCIUM: CPT

## 2024-12-07 PROCEDURE — 99285 EMERGENCY DEPT VISIT HI MDM: CPT

## 2024-12-07 PROCEDURE — 83605 ASSAY OF LACTIC ACID: CPT

## 2024-12-07 PROCEDURE — 84484 ASSAY OF TROPONIN QUANT: CPT

## 2024-12-07 PROCEDURE — 80307 DRUG TEST PRSMV CHEM ANLYZR: CPT

## 2024-12-07 PROCEDURE — 84443 ASSAY THYROID STIM HORMONE: CPT

## 2024-12-07 PROCEDURE — 85025 COMPLETE CBC W/AUTO DIFF WBC: CPT

## 2024-12-07 PROCEDURE — 2580000003 HC RX 258: Performed by: STUDENT IN AN ORGANIZED HEALTH CARE EDUCATION/TRAINING PROGRAM

## 2024-12-07 PROCEDURE — 71045 X-RAY EXAM CHEST 1 VIEW: CPT

## 2024-12-07 PROCEDURE — 84295 ASSAY OF SERUM SODIUM: CPT

## 2024-12-07 PROCEDURE — 81001 URINALYSIS AUTO W/SCOPE: CPT

## 2024-12-07 RX ORDER — 0.9 % SODIUM CHLORIDE 0.9 %
500 INTRAVENOUS SOLUTION INTRAVENOUS ONCE
Status: COMPLETED | OUTPATIENT
Start: 2024-12-07 | End: 2024-12-08

## 2024-12-07 RX ORDER — OLANZAPINE 5 MG/1
5 TABLET ORAL ONCE
Status: DISCONTINUED | OUTPATIENT
Start: 2024-12-07 | End: 2024-12-07

## 2024-12-07 RX ORDER — WATER 10 ML/10ML
INJECTION INTRAMUSCULAR; INTRAVENOUS; SUBCUTANEOUS
Status: DISPENSED
Start: 2024-12-07 | End: 2024-12-08

## 2024-12-07 RX ORDER — ZIPRASIDONE MESYLATE 20 MG/ML
INJECTION, POWDER, LYOPHILIZED, FOR SOLUTION INTRAMUSCULAR
Status: DISCONTINUED
Start: 2024-12-07 | End: 2024-12-07 | Stop reason: WASHOUT

## 2024-12-07 RX ADMIN — ZIPRASIDONE MESYLATE 10 MG: 20 INJECTION, POWDER, LYOPHILIZED, FOR SOLUTION INTRAMUSCULAR at 20:04

## 2024-12-07 RX ADMIN — SODIUM CHLORIDE 500 ML: 9 INJECTION, SOLUTION INTRAVENOUS at 23:29

## 2024-12-07 ASSESSMENT — LIFESTYLE VARIABLES
HOW MANY STANDARD DRINKS CONTAINING ALCOHOL DO YOU HAVE ON A TYPICAL DAY: PATIENT UNABLE TO ANSWER
HOW OFTEN DO YOU HAVE A DRINK CONTAINING ALCOHOL: PATIENT UNABLE TO ANSWER

## 2024-12-08 ENCOUNTER — APPOINTMENT (OUTPATIENT)
Facility: HOSPITAL | Age: 74
End: 2024-12-08
Attending: HOSPITALIST
Payer: MEDICARE

## 2024-12-08 ENCOUNTER — TELEPHONE (OUTPATIENT)
Age: 74
End: 2024-12-08

## 2024-12-08 ENCOUNTER — APPOINTMENT (OUTPATIENT)
Facility: HOSPITAL | Age: 74
End: 2024-12-08
Payer: MEDICARE

## 2024-12-08 DIAGNOSIS — R41.0 CONFUSION: Primary | ICD-10-CM

## 2024-12-08 PROBLEM — I10 PRIMARY HYPERTENSION: Status: ACTIVE | Noted: 2024-12-08

## 2024-12-08 PROBLEM — R79.89 TROPONIN I ABOVE REFERENCE RANGE: Status: ACTIVE | Noted: 2024-12-08

## 2024-12-08 PROBLEM — R41.82 ALTERED MENTAL STATUS: Status: ACTIVE | Noted: 2024-12-08

## 2024-12-08 LAB
COMMENT:: NORMAL
CRP SERPL-MCNC: 0.53 MG/DL (ref 0–0.3)
ECHO AO ASC DIAM: 3.4 CM
ECHO AO ASCENDING AORTA INDEX: 1.92 CM/M2
ECHO BSA: 1.81 M2
ECHO EST RA PRESSURE: 3 MMHG
ECHO LV EDV A2C: 35 ML
ECHO LV EDV A4C: 45 ML
ECHO LV EDV BP: 41 ML (ref 56–104)
ECHO LV EDV INDEX A4C: 25 ML/M2
ECHO LV EDV INDEX BP: 23 ML/M2
ECHO LV EDV NDEX A2C: 20 ML/M2
ECHO LV EJECTION FRACTION A2C: 49 %
ECHO LV EJECTION FRACTION A4C: 50 %
ECHO LV EJECTION FRACTION BIPLANE: 49 % (ref 55–100)
ECHO LV ESV A2C: 18 ML
ECHO LV ESV A4C: 23 ML
ECHO LV ESV BP: 21 ML (ref 19–49)
ECHO LV ESV INDEX A2C: 10 ML/M2
ECHO LV ESV INDEX A4C: 13 ML/M2
ECHO LV ESV INDEX BP: 12 ML/M2
EKG ATRIAL RATE: 133 BPM
EKG ATRIAL RATE: 91 BPM
EKG ATRIAL RATE: 94 BPM
EKG DIAGNOSIS: NORMAL
EKG P AXIS: 31 DEGREES
EKG P-R INTERVAL: 126 MS
EKG P-R INTERVAL: 132 MS
EKG P-R INTERVAL: 146 MS
EKG Q-T INTERVAL: 308 MS
EKG Q-T INTERVAL: 358 MS
EKG Q-T INTERVAL: 362 MS
EKG QRS DURATION: 76 MS
EKG QRS DURATION: 78 MS
EKG QRS DURATION: 82 MS
EKG QTC CALCULATION (BAZETT): 445 MS
EKG QTC CALCULATION (BAZETT): 447 MS
EKG QTC CALCULATION (BAZETT): 458 MS
EKG R AXIS: -14 DEGREES
EKG R AXIS: -18 DEGREES
EKG R AXIS: -21 DEGREES
EKG T AXIS: 154 DEGREES
EKG T AXIS: 184 DEGREES
EKG T AXIS: 77 DEGREES
EKG VENTRICULAR RATE: 133 BPM
EKG VENTRICULAR RATE: 91 BPM
EKG VENTRICULAR RATE: 94 BPM
ERYTHROCYTE [SEDIMENTATION RATE] IN BLOOD: 3 MM/HR (ref 0–30)
EST. AVERAGE GLUCOSE BLD GHB EST-MCNC: 154 MG/DL
ETHANOL SERPL-MCNC: <10 MG/DL (ref 0–0.08)
FOLATE SERPL-MCNC: 7.2 NG/ML (ref 5–21)
GLUCOSE BLD STRIP.AUTO-MCNC: 184 MG/DL (ref 65–117)
GLUCOSE BLD STRIP.AUTO-MCNC: 191 MG/DL (ref 65–117)
GLUCOSE BLD STRIP.AUTO-MCNC: 243 MG/DL (ref 65–117)
GLUCOSE BLD STRIP.AUTO-MCNC: 248 MG/DL (ref 65–117)
GLUCOSE BLD STRIP.AUTO-MCNC: 330 MG/DL (ref 65–117)
HBA1C MFR BLD: 7 % (ref 4–5.6)
HIV 1+2 AB+HIV1 P24 AG SERPL QL IA: NONREACTIVE
HIV 1/2 RESULT COMMENT: NORMAL
LACTATE SERPL-SCNC: 1 MMOL/L (ref 0.4–2)
RPR SER QL: NONREACTIVE
SERVICE CMNT-IMP: ABNORMAL
SPECIMEN HOLD: NORMAL
TROPONIN I SERPL HS-MCNC: 313 NG/L (ref 0–51)
TROPONIN I SERPL HS-MCNC: 322 NG/L (ref 0–51)
VIT B12 SERPL-MCNC: 230 PG/ML (ref 193–986)

## 2024-12-08 PROCEDURE — 99222 1ST HOSP IP/OBS MODERATE 55: CPT | Performed by: INTERNAL MEDICINE

## 2024-12-08 PROCEDURE — 85652 RBC SED RATE AUTOMATED: CPT

## 2024-12-08 PROCEDURE — 6370000000 HC RX 637 (ALT 250 FOR IP): Performed by: HOSPITALIST

## 2024-12-08 PROCEDURE — 86225 DNA ANTIBODY NATIVE: CPT

## 2024-12-08 PROCEDURE — 83036 HEMOGLOBIN GLYCOSYLATED A1C: CPT

## 2024-12-08 PROCEDURE — 93005 ELECTROCARDIOGRAM TRACING: CPT | Performed by: HOSPITALIST

## 2024-12-08 PROCEDURE — 86618 LYME DISEASE ANTIBODY: CPT

## 2024-12-08 PROCEDURE — 6360000002 HC RX W HCPCS: Performed by: EMERGENCY MEDICINE

## 2024-12-08 PROCEDURE — 2580000003 HC RX 258: Performed by: HOSPITALIST

## 2024-12-08 PROCEDURE — 36415 COLL VENOUS BLD VENIPUNCTURE: CPT

## 2024-12-08 PROCEDURE — 2580000003 HC RX 258: Performed by: STUDENT IN AN ORGANIZED HEALTH CARE EDUCATION/TRAINING PROGRAM

## 2024-12-08 PROCEDURE — 87389 HIV-1 AG W/HIV-1&-2 AB AG IA: CPT

## 2024-12-08 PROCEDURE — 51701 INSERT BLADDER CATHETER: CPT

## 2024-12-08 PROCEDURE — 6360000002 HC RX W HCPCS: Performed by: NURSE PRACTITIONER

## 2024-12-08 PROCEDURE — 86235 NUCLEAR ANTIGEN ANTIBODY: CPT

## 2024-12-08 PROCEDURE — 99221 1ST HOSP IP/OBS SF/LOW 40: CPT | Performed by: NURSE PRACTITIONER

## 2024-12-08 PROCEDURE — 2060000000 HC ICU INTERMEDIATE R&B

## 2024-12-08 PROCEDURE — 86140 C-REACTIVE PROTEIN: CPT

## 2024-12-08 PROCEDURE — 82607 VITAMIN B-12: CPT

## 2024-12-08 PROCEDURE — 93308 TTE F-UP OR LMTD: CPT

## 2024-12-08 PROCEDURE — 82962 GLUCOSE BLOOD TEST: CPT

## 2024-12-08 PROCEDURE — 83605 ASSAY OF LACTIC ACID: CPT

## 2024-12-08 PROCEDURE — 84484 ASSAY OF TROPONIN QUANT: CPT

## 2024-12-08 PROCEDURE — 82077 ASSAY SPEC XCP UR&BREATH IA: CPT

## 2024-12-08 PROCEDURE — 6360000002 HC RX W HCPCS: Performed by: HOSPITALIST

## 2024-12-08 PROCEDURE — 2580000003 HC RX 258: Performed by: EMERGENCY MEDICINE

## 2024-12-08 PROCEDURE — 51798 US URINE CAPACITY MEASURE: CPT

## 2024-12-08 PROCEDURE — 6360000002 HC RX W HCPCS: Performed by: STUDENT IN AN ORGANIZED HEALTH CARE EDUCATION/TRAINING PROGRAM

## 2024-12-08 PROCEDURE — 2500000003 HC RX 250 WO HCPCS: Performed by: HOSPITALIST

## 2024-12-08 PROCEDURE — 82746 ASSAY OF FOLIC ACID SERUM: CPT

## 2024-12-08 PROCEDURE — 86592 SYPHILIS TEST NON-TREP QUAL: CPT

## 2024-12-08 RX ORDER — INSULIN GLARGINE 100 [IU]/ML
15 INJECTION, SOLUTION SUBCUTANEOUS DAILY
Status: DISCONTINUED | OUTPATIENT
Start: 2024-12-08 | End: 2024-12-13 | Stop reason: HOSPADM

## 2024-12-08 RX ORDER — ONDANSETRON 2 MG/ML
4 INJECTION INTRAMUSCULAR; INTRAVENOUS EVERY 6 HOURS PRN
Status: DISCONTINUED | OUTPATIENT
Start: 2024-12-08 | End: 2024-12-13 | Stop reason: HOSPADM

## 2024-12-08 RX ORDER — ONDANSETRON 4 MG/1
4 TABLET, ORALLY DISINTEGRATING ORAL EVERY 8 HOURS PRN
Status: DISCONTINUED | OUTPATIENT
Start: 2024-12-08 | End: 2024-12-13 | Stop reason: HOSPADM

## 2024-12-08 RX ORDER — ASPIRIN 81 MG/1
81 TABLET, CHEWABLE ORAL DAILY
Status: DISCONTINUED | OUTPATIENT
Start: 2024-12-08 | End: 2024-12-08

## 2024-12-08 RX ORDER — SODIUM CHLORIDE 9 MG/ML
INJECTION, SOLUTION INTRAVENOUS PRN
Status: DISCONTINUED | OUTPATIENT
Start: 2024-12-08 | End: 2024-12-13 | Stop reason: HOSPADM

## 2024-12-08 RX ORDER — ESCITALOPRAM OXALATE 10 MG/1
5 TABLET ORAL DAILY
Status: DISCONTINUED | OUTPATIENT
Start: 2024-12-08 | End: 2024-12-08

## 2024-12-08 RX ORDER — HALOPERIDOL 5 MG/ML
5 INJECTION INTRAMUSCULAR
Status: COMPLETED | OUTPATIENT
Start: 2024-12-08 | End: 2024-12-08

## 2024-12-08 RX ORDER — DEXTROSE MONOHYDRATE 100 MG/ML
INJECTION, SOLUTION INTRAVENOUS CONTINUOUS PRN
Status: DISCONTINUED | OUTPATIENT
Start: 2024-12-08 | End: 2024-12-13 | Stop reason: HOSPADM

## 2024-12-08 RX ORDER — POLYETHYLENE GLYCOL 3350 17 G/17G
17 POWDER, FOR SOLUTION ORAL DAILY PRN
Status: DISCONTINUED | OUTPATIENT
Start: 2024-12-08 | End: 2024-12-13 | Stop reason: HOSPADM

## 2024-12-08 RX ORDER — SODIUM CHLORIDE 0.9 % (FLUSH) 0.9 %
5-40 SYRINGE (ML) INJECTION EVERY 12 HOURS SCHEDULED
Status: DISCONTINUED | OUTPATIENT
Start: 2024-12-08 | End: 2024-12-13 | Stop reason: HOSPADM

## 2024-12-08 RX ORDER — ESCITALOPRAM OXALATE 10 MG/1
20 TABLET ORAL DAILY
Status: DISCONTINUED | OUTPATIENT
Start: 2024-12-09 | End: 2024-12-13 | Stop reason: HOSPADM

## 2024-12-08 RX ORDER — ATORVASTATIN CALCIUM 40 MG/1
80 TABLET, FILM COATED ORAL NIGHTLY
Status: DISCONTINUED | OUTPATIENT
Start: 2024-12-08 | End: 2024-12-13 | Stop reason: HOSPADM

## 2024-12-08 RX ORDER — INSULIN LISPRO 100 [IU]/ML
0-8 INJECTION, SOLUTION INTRAVENOUS; SUBCUTANEOUS
Status: DISCONTINUED | OUTPATIENT
Start: 2024-12-08 | End: 2024-12-13 | Stop reason: HOSPADM

## 2024-12-08 RX ORDER — HYDRALAZINE HYDROCHLORIDE 50 MG/1
50 TABLET, FILM COATED ORAL 3 TIMES DAILY
Status: DISCONTINUED | OUTPATIENT
Start: 2024-12-08 | End: 2024-12-08

## 2024-12-08 RX ORDER — POTASSIUM CHLORIDE 750 MG/1
40 TABLET, EXTENDED RELEASE ORAL PRN
Status: DISCONTINUED | OUTPATIENT
Start: 2024-12-08 | End: 2024-12-13 | Stop reason: HOSPADM

## 2024-12-08 RX ORDER — LORAZEPAM 2 MG/ML
0.5 INJECTION INTRAMUSCULAR EVERY 6 HOURS PRN
Status: DISCONTINUED | OUTPATIENT
Start: 2024-12-08 | End: 2024-12-13 | Stop reason: HOSPADM

## 2024-12-08 RX ORDER — CLONIDINE HYDROCHLORIDE 0.1 MG/1
0.2 TABLET ORAL 2 TIMES DAILY
Status: DISCONTINUED | OUTPATIENT
Start: 2024-12-08 | End: 2024-12-13 | Stop reason: HOSPADM

## 2024-12-08 RX ORDER — MAGNESIUM SULFATE IN WATER 40 MG/ML
2000 INJECTION, SOLUTION INTRAVENOUS PRN
Status: DISCONTINUED | OUTPATIENT
Start: 2024-12-08 | End: 2024-12-13 | Stop reason: HOSPADM

## 2024-12-08 RX ORDER — SODIUM CHLORIDE 0.9 % (FLUSH) 0.9 %
5-40 SYRINGE (ML) INJECTION PRN
Status: DISCONTINUED | OUTPATIENT
Start: 2024-12-08 | End: 2024-12-13 | Stop reason: HOSPADM

## 2024-12-08 RX ORDER — CLONIDINE HYDROCHLORIDE 0.1 MG/1
0.1 TABLET ORAL 2 TIMES DAILY
Status: DISCONTINUED | OUTPATIENT
Start: 2024-12-08 | End: 2024-12-08

## 2024-12-08 RX ORDER — METOPROLOL SUCCINATE 50 MG/1
50 TABLET, EXTENDED RELEASE ORAL DAILY
Status: DISCONTINUED | OUTPATIENT
Start: 2024-12-08 | End: 2024-12-08

## 2024-12-08 RX ORDER — METOPROLOL TARTRATE 1 MG/ML
5 INJECTION, SOLUTION INTRAVENOUS EVERY 8 HOURS
Status: DISCONTINUED | OUTPATIENT
Start: 2024-12-08 | End: 2024-12-11

## 2024-12-08 RX ORDER — SODIUM CHLORIDE 9 MG/ML
INJECTION, SOLUTION INTRAVENOUS CONTINUOUS
Status: DISCONTINUED | OUTPATIENT
Start: 2024-12-08 | End: 2024-12-10

## 2024-12-08 RX ORDER — CLOPIDOGREL BISULFATE 75 MG/1
75 TABLET ORAL DAILY
Status: DISCONTINUED | OUTPATIENT
Start: 2024-12-08 | End: 2024-12-13 | Stop reason: HOSPADM

## 2024-12-08 RX ORDER — ENOXAPARIN SODIUM 100 MG/ML
40 INJECTION SUBCUTANEOUS DAILY
Status: DISCONTINUED | OUTPATIENT
Start: 2024-12-08 | End: 2024-12-09

## 2024-12-08 RX ORDER — ACETAMINOPHEN 325 MG/1
650 TABLET ORAL EVERY 6 HOURS PRN
Status: DISCONTINUED | OUTPATIENT
Start: 2024-12-08 | End: 2024-12-13 | Stop reason: HOSPADM

## 2024-12-08 RX ORDER — HYDRALAZINE HYDROCHLORIDE 50 MG/1
25 TABLET, FILM COATED ORAL 3 TIMES DAILY
Status: DISCONTINUED | OUTPATIENT
Start: 2024-12-08 | End: 2024-12-13 | Stop reason: HOSPADM

## 2024-12-08 RX ORDER — POTASSIUM CHLORIDE 7.45 MG/ML
10 INJECTION INTRAVENOUS PRN
Status: DISCONTINUED | OUTPATIENT
Start: 2024-12-08 | End: 2024-12-13 | Stop reason: HOSPADM

## 2024-12-08 RX ORDER — ACETAMINOPHEN 650 MG/1
650 SUPPOSITORY RECTAL EVERY 6 HOURS PRN
Status: DISCONTINUED | OUTPATIENT
Start: 2024-12-08 | End: 2024-12-13 | Stop reason: HOSPADM

## 2024-12-08 RX ORDER — FAMOTIDINE 20 MG/1
20 TABLET, FILM COATED ORAL DAILY
Status: DISCONTINUED | OUTPATIENT
Start: 2024-12-08 | End: 2024-12-13 | Stop reason: HOSPADM

## 2024-12-08 RX ADMIN — SODIUM CHLORIDE: 9 INJECTION, SOLUTION INTRAVENOUS at 13:31

## 2024-12-08 RX ADMIN — HYDRALAZINE HYDROCHLORIDE 25 MG: 50 TABLET ORAL at 15:18

## 2024-12-08 RX ADMIN — INSULIN LISPRO 2 UNITS: 100 INJECTION, SOLUTION INTRAVENOUS; SUBCUTANEOUS at 23:02

## 2024-12-08 RX ADMIN — HALOPERIDOL LACTATE 5 MG: 5 INJECTION, SOLUTION INTRAMUSCULAR at 18:33

## 2024-12-08 RX ADMIN — INSULIN LISPRO 2 UNITS: 100 INJECTION, SOLUTION INTRAVENOUS; SUBCUTANEOUS at 13:51

## 2024-12-08 RX ADMIN — INSULIN LISPRO 2 UNITS: 100 INJECTION, SOLUTION INTRAVENOUS; SUBCUTANEOUS at 16:44

## 2024-12-08 RX ADMIN — SODIUM CHLORIDE, PRESERVATIVE FREE 10 ML: 5 INJECTION INTRAVENOUS at 09:18

## 2024-12-08 RX ADMIN — OLANZAPINE 5 MG: 10 INJECTION, POWDER, FOR SOLUTION INTRAMUSCULAR at 04:42

## 2024-12-08 RX ADMIN — LORAZEPAM 0.5 MG: 2 INJECTION INTRAMUSCULAR; INTRAVENOUS at 19:06

## 2024-12-08 RX ADMIN — LORAZEPAM 0.5 MG: 2 INJECTION INTRAMUSCULAR; INTRAVENOUS at 13:23

## 2024-12-08 RX ADMIN — SODIUM CHLORIDE, PRESERVATIVE FREE 10 ML: 5 INJECTION INTRAVENOUS at 21:00

## 2024-12-08 RX ADMIN — ENOXAPARIN SODIUM 40 MG: 100 INJECTION SUBCUTANEOUS at 13:39

## 2024-12-08 RX ADMIN — METOPROLOL TARTRATE 5 MG: 1 INJECTION, SOLUTION INTRAVENOUS at 22:57

## 2024-12-08 RX ADMIN — WATER 5 MG: 1 INJECTION INTRAMUSCULAR; INTRAVENOUS; SUBCUTANEOUS at 07:37

## 2024-12-08 RX ADMIN — METOPROLOL TARTRATE 5 MG: 1 INJECTION, SOLUTION INTRAVENOUS at 12:36

## 2024-12-08 ASSESSMENT — PAIN - FUNCTIONAL ASSESSMENT
PAIN_FUNCTIONAL_ASSESSMENT: 0-10
PAIN_FUNCTIONAL_ASSESSMENT: 0-10

## 2024-12-08 ASSESSMENT — PAIN SCALES - GENERAL
PAINLEVEL_OUTOF10: 0
PAINLEVEL_OUTOF10: 5

## 2024-12-08 NOTE — CONSULTS
[facility]  PSYCHIATRY CONSULT NOTE:    Name: Karlee Leon  MR#: [id number]  : 1950  ACCOUNT#: [id number]  ADMIT DATE: 2024    REASON FOR CONSULT: AMS      HISTORY OF PRESENTING COMPLAINT:  Karlee Leon is a 74 y.o. female currently admitted to [facility] neuro telemetry for medical treatment.  She is experiencing delirium with workup for underlying etiology likely secondary to metabolic multifactorial etiology.  Psychiatry was asked to evaluate and help manage for any possible psychiatric symptoms or underlying psychiatric acute symptoms.  She was seen and her nurse was at bedside with her reporting that she had \"just settled down for the first time\".  She did not easily aroused when lightly calling her name but her nurse reports that she was given a dose of Ativan and since administration of this delayed within about an hour and a half she started to call down and be more appropriate in her disposition and behavior.  Since that time she was also given a dose of olanzapine, unsure how she responded? And a dose of Haldol IM in efforts to help her relax prior to MRI.  Nursing had reported \"it did not touch her\".    She currently lives at a nursing facility and was sent for this increased confusion.  She does have a history of major depression and takes Lexapro    PAST PSYCHIATRIC HISTORY: She takes Lexapro for depression    SUBSTANCE ABUSE HISTORY: Unknown    PSYCHOSOCIAL HISTORY: Living in local area at nursing facility    MENTAL STATUS EXAM:   Karlee Leon is a 74 y.o. White (non-) female who is resting in bed and groggy with her eyes closed no distress at this time.  Unable to mental status exam at this time.    DIAGNOSTIC IMPRESSION: Delirium secondary to underlying metabolic etiology associated with periods of psychosis    ASSESSMENT/PLAN:     Continue management of delirium by nonpharmacological means like-reorient, soothing music, oriented to day and nighttime.    Any

## 2024-12-08 NOTE — ED NOTES
TRANSFER - OUT REPORT:    Verbal report given to ALONDRA Velasco on Karlee Leon  being transferred to ED for routine progression of patient care       Report consisted of patient's Situation, Background, Assessment and   Recommendations(SBAR).     Information from the following report(s) Nurse Handoff Report, ED Encounter Summary, ED SBAR, Adult Overview, MAR, Recent Results, Med Rec Status, and Cardiac Rhythm Sinus Rhythm  was reviewed with the receiving nurse.    [place] Fall Assessment:                           Lines:   Peripheral IV 12/07/24 Left Antecubital (Active)        Opportunity for questions and clarification was provided.      Patient transported with:  Tech

## 2024-12-08 NOTE — PROGRESS NOTES
Patient examined admitted earlier today chart reviewed Spoke with AR who tells me she is DNR code status changed   She is refusing all PO meds. She was on metoprolol at home now refusing it will start on IV Metoprolol till she agrees to take PO meds. Troponin high card consulted obtain EKG. She was retaining urine Wills placed 1100 cc came out start on IV fluids. Refusing accu check at this time psych consulted  had stroke 2 years ago MRI ordered . At Adult home she is on Trulicity and Glipizide here started on Basal/ bolus Insulin. Home meds to be restarted once she agrees to take meds    Lexapro 20  Lipitor 80  Clonidine 0.2 BID  Hydralazine 25 TID  Metoprolol 75 daily  Plavix 75 daily  Trulicity once week  Gabapentin TID

## 2024-12-08 NOTE — PROGRESS NOTES
Occupational therapy  12/08/24     OT eval order received and acknowledged. Chart reviewed and discussed with nursing, patient agitated at this time and nursing requesting therapy to defer until patient calm and able to participate. Will continue to follow patient and see when able.    Thank you,  Apoorva Staton, OTR/L

## 2024-12-08 NOTE — PROGRESS NOTES
Spiritual Health History and Assessment/Progress Note  [facility]    (P) Spiritual/Emotional Needs,  ,  ,      Name: Karlee Leon MRN: [id number]    Age: 74 y.o.     Sex: female   Language: English   Mu-ism: Scientologist   Acute encephalopathy     Date: 12/8/2024            Total Time Calculated: (P) 65 min              Spiritual Assessment began in [facility] 6S NEURO-SCI TELE        Referral/Consult From: (P) Nurse   Encounter Overview/Reason: (P) Spiritual/Emotional Needs  Service Provided For: (P) Patient    Hortensia, Belief, Meaning:   Patient is connected with a hortensia tradition or spiritual practice  Family/Friends No family/friends present      Importance and Influence:  Patient has spiritual/personal beliefs that influence decisions regarding their health  Family/Friends No family/friends present    Community:  Patient expresses feelings of isolation: disconnected from family/friends, feeling there is no one to turn to for help, and feeling no one understands  Family/Friends No family/friends present    Assessment and Plan of Care:     Patient Interventions include: Facilitated expression of thoughts and feelings and Explored spiritual coping/struggle/distress.  engaged to try to settle anxiety and move patient towards engaging with medical team to eat/drink and maintain cooperation. Improvement was expressed by medical staff and visit ended with patient eating and drinking.  Family/Friends Interventions include: No family/friends present    Patient Plan of Care: Spiritual Care available upon further referral  Family/Friends Plan of Care: No family/friends present    Electronically signed by Reid Hawkinsin Resident on 12/8/2024 at 3:19 PM

## 2024-12-08 NOTE — CONSULTS
[facility] CARDIOLOGY  Cardiology Care Note                  [x]Initial visit     []Established visit     Patient Name: Karlee Loen - :1950 - MRN:[id number]  Primary Cardiologist: None  Consulting Cardiologist: Dr Shukri Hurtado      2024 4:44 PM     Reason for initial visit: Sinus tachycardia with elevated troponin    Majority of history obtained from records as patient is confused and agitated    Assessment/Plan/Discussion: Cardiology Attending:           ASSESSMENT    Elevated troponin most likely secondary to underlying stroke versus uncontrolled hypertension  Uncontrolled hypertension  Sinus tachycardia  Altered mental status with agitation and disorientation  Acute delirium   hyperlipidemia    PLAN   Patient presented to the hospital with altered mental status and delirium suspected stroke awaiting MRI brain CT head shows chronic microvascular angiopathy with cerebral atrophy  We were consulted as patient has elevated troponin which is most likely due to acute stroke versus hypertensive encephalopathy patient denies any chest pain  EKG shows normal sinus rhythm with nonspecific ST-T changes  At present I would continue conservative medical management with no ischemic workup needed at this moment  Continue Plavix for suspected stroke along with Lipitor for hyperlipidemia  Sinus tachycardia is most likely due to agitation and decreased appetite would recommend restarting metoprolol XL 75 mg once daily if patient refuses to take p.o. may consider IV as needed metoprolol  Hypertension mildly uncontrolled currently on hydralazine 25 3 times daily/clonidine 0.2 mg twice daily management as per hospitalist   Last blood pressure 157/97  Will sign off please call if any questions    Dr Shukri Hurtado MD, FACC  [facility]  [facility]    We discussed the expected course,

## 2024-12-08 NOTE — H&P
History & Physical    Primary Care Provider: Terrence Trejo MD  Source of Information: Patient and chart review    History of Presenting Illness:   Karlee Leon is a 74 y.o. female with past medical history of CVA with resultant gait disturbances and intermittent vertigo, chronic arthritic pain, diabetes, GERD, hypertension, ulcerative colitis who was brought to [facility] emergency department from long-term care facilities for concerns of increasing confusion.  Per chart review, she was seen increasingly confused at her long-term care facility over the last day.  She has reportedly also been refusing her medications has been alert to person only.  I have also been concerns creased somnolence.    History from patient is limited due to confused state but she is able to answer yes and no questions.  The patient denies any fever, chills, chest or abdominal pain, nausea, vomiting, cough, congestion, recent illness, palpitations, or dysuria.  Remarkable vitals on ER Presentation: hr to 137, bp to 170/104  Labs Remarkable for: Hgb 16.1, , CR 2.01, Trope 316-->313  ER Images: CT head: Sequelae of chronic microvascular angiopathy and cerebral atrophy  Chest x-ray: No acute process.  ER Rx: 500 cc normal saline bolus, Geodon 10 mg     Review of Systems:  Pertinent items are noted in the History of Present Illness.     Past Medical History:   Diagnosis Date    Arthritis     Chronic pain     Diabetes (HCC)     GERD (gastroesophageal reflux disease)     Hypertension     Ill-defined condition     ucerative colitis    Ulcerative colitis (HCC)     Vertigo       Past Surgical History:   Procedure Laterality Date    GI      colonoscopy    ORTHOPEDIC SURGERY Right 2014    FOOT    OTHER SURGICAL HISTORY      finger surgery, foot surgery     Prior to Admission medications    Medication Sig Start Date End Date Taking? Authorizing Provider   acetaminophen (TYLENOL) 325 MG tablet Take 2 tablets by mouth every 6 hours  Ref Range    Color, UA YELLOW/STRAW      Appearance CLEAR CLEAR      Specific Gravity, UA 1.024 1.003 - 1.030      pH, Urine 5.5 5.0 - 8.0      Protein, UA 30 (A) NEG mg/dL    Glucose, Ur >1000 (A) NEG mg/dL    Ketones, Urine Negative NEG mg/dL    Bilirubin, Urine Negative NEG      Blood, Urine Negative NEG      Urobilinogen, Urine 0.2 0.2 - 1.0 EU/dL    Nitrite, Urine Negative NEG      Leukocyte Esterase, Urine Negative NEG      WBC, UA 0-4 0 - 4 /hpf    RBC, UA 0-5 0 - 5 /hpf    Epithelial Cells, UA FEW FEW /lpf    BACTERIA, URINE Negative NEG /hpf   EKG 12 Lead (Abn HR)    Collection Time: 12/07/24 11:21 PM   Result Value Ref Range    Ventricular Rate 133 BPM    Atrial Rate 133 BPM    P-R Interval 146 ms    QRS Duration 78 ms    Q-T Interval 308 ms    QTc Calculation (Bazett) 458 ms    R Axis -14 degrees    T Axis 184 degrees    Diagnosis       Sinus tachycardia with premature atrial complexes  Anterior infarct , age undetermined  ST & T wave abnormality, consider lateral ischemia  Abnormal ECG  When compared with ECG of 07-DEC-2024 20:31,  MANUAL COMPARISON REQUIRED, DATA IS UNCONFIRMED     Troponin    Collection Time: 12/08/24 12:53 AM   Result Value Ref Range    Troponin, High Sensitivity 313 (HH) 0 - 51 ng/L   POCT Glucose    Collection Time: 12/08/24  2:08 AM   Result Value Ref Range    POC Glucose 184 (H) 65 - 117 mg/dL    Performed by: CAROLINA Cameron RN          Imaging:     Assessment:     Karlee Leon is a 74 y.o. female with past medical history of CVA with resultant gait disturbances and intermittent vertigo, chronic arthritic pain, diabetes, GERD, hypertension, ulcerative colitis who is admitted for altered mental status.       Plan:       AMS / Encephalopathy  -CT head shows chronic microangiopathic changes  -Extensive workup in ED to include ammonia, TSH, mag, UDS, lactic acid, UA- unremak  -Treated with Geodon 10 mg in ED  -Continue as needed Zyprexa  -Check MARYCHUY, RPR, HIV, ESR, CRP, B12 and

## 2024-12-08 NOTE — PROGRESS NOTES
1115 Spoke with family member Fabien on the phone. Ron is currently unable to make decisions due to his own medical problems. They are going to check on him now. He will keep his phone nearby in case we need him.     Him and his current wife are going to stop by tomorrow at 10am.

## 2024-12-08 NOTE — ED PROVIDER NOTES
EMERGENCY DEPARTMENT PHYSICIAN NOTE     Patient: Karlee Leon     Time of Service: 12/7/2024  7:30 PM     Chief complaint:   Chief Complaint   Patient presents with    Altered Mental Status    Hypertension        HISTORY:  Patient is a 74 y.o. female who presents to the emergency department with EMS for complaints of patient with hypertension not taking meds and not acting herself.  Symptoms have been present for at least 24 hours.  Reviewed patient's chart and she has 2 sons in the area.  After about her children she says she does not have any children.  Patient ANO x 1.  Patient outside of time window for stroke activation.  Patient with history of stroke.      Sleeping more per son, going to sleep at 5pm. Not acting herself for about a week. Stroke 3 years ago. Hx of posterior circulation stroke. Son bill makes decisions. No diagnosis of dementia. Does not walk anymore. Hasn't walked in a few years. In motorized wheelchair. Son is POA and consents for treatment and evaluation.       Past Medical History:   Diagnosis Date    Arthritis     Chronic pain     Diabetes (HCC)     GERD (gastroesophageal reflux disease)     Hypertension     Ill-defined condition     ucerative colitis    Ulcerative colitis (HCC)     Vertigo         Past Surgical History:   Procedure Laterality Date    GI      colonoscopy    ORTHOPEDIC SURGERY Right 2014    FOOT    OTHER SURGICAL HISTORY      finger surgery, foot surgery        Family History   Problem Relation Age of Onset    Hearing Impairment Brother     Heart Disease Mother     Diabetes Father     Diabetes Mother         Social History     Socioeconomic History    Marital status: Single     Spouse name: None    Number of children: None    Years of education: None    Highest education level: None   Tobacco Use    Smoking status: Never     Passive exposure: Never    Smokeless tobacco: Never   Vaping Use    Vaping status: Never Used   Substance and Sexual Activity    Alcohol use:

## 2024-12-08 NOTE — PROGRESS NOTES
Physical Therapy 12/8/2024       PT eval order received and acknowledged. Chart reviewed and discussed with nursing, patient agitated at this time and nursing requesting therapy to defer until patient calm and able to participate. Will continue to follow patient and see when able.     Salty Bingham, PT

## 2024-12-08 NOTE — PROGRESS NOTES
Attempted to take pt to MRI. They became upset that they could not keep glasses and hearing aids with them. They refused the MRI and became increasingly more agitated. We returned to room.

## 2024-12-08 NOTE — ED TRIAGE NOTES
Patient arrives by EMS from [facility] living with c/o fatigue, increased altered mental status for about a week. Patient is alert but confused. Per EMS, patient refused to take her medications this afternoon. Patient has a left sided facial droop, unknown time since she's developed the symptom. Patient is also drooling on that side.

## 2024-12-08 NOTE — CONSULTS
Neurology Consult    Patient: Karlee Leon MRN: [id number]     YOB: 1950  Age: 74 y.o.  Sex: female      HPI     Karlee Leon is a 74 y.o. female w/pmh of arthritis, GERD, diabetes, hypertension, ulcerative colitis, left middle cerbellar peduncle stroke in 2022 who presents with AMS from her long term care facility for which Neurology was consulted.     Patient is confused, asking examiner to leave the room and there is no family at bedside. Thus, history obtained from chart review.     Per ER/Hospitalist documentation \"Sleeping more per son, going to sleep at 5pm. Not acting herself for about a week. Stroke 3 years ago. Hx of posterior circulation stroke. Son don makes decisions. No diagnosis of dementia. Does not walk anymore. Hasn't walked in a few years. In motorized wheelchair. Son is POA and consents for treatment and evaluation.\"     Initial lab work notable for Hgb 16.1, Glu 426, Cr 2.01, Trop 316. CT Head obtained in the ER w/chronic microvascular angiopathy and cerebral atrophy. CXR with no acute process. SBP in the 160-180 range in the last 24 hours.     Past Medical History:   Diagnosis Date    Arthritis     Chronic pain     Diabetes (HCC)     GERD (gastroesophageal reflux disease)     Hypertension     Ill-defined condition     ucerative colitis    Ulcerative colitis (HCC)     Vertigo      Family History   Problem Relation Age of Onset    Hearing Impairment Brother     Heart Disease Mother     Diabetes Father     Diabetes Mother      Social History     Tobacco Use    Smoking status: Never     Passive exposure: Never    Smokeless tobacco: Never   Substance Use Topics    Alcohol use: No      Prior to Admission Medications   Prescriptions Last Dose Informant Patient Reported? Taking?   acetaminophen (TYLENOL) 325 MG tablet   Yes No   Sig: Take 2 tablets by mouth every 6 hours as needed   atorvastatin (LIPITOR) 80 MG tablet   Yes No   Sig: Take 1 tablet by mouth nightly   cloNIDine

## 2024-12-08 NOTE — CARE COORDINATION
Transition of Care Plan:    RUR: 8%    Patient was not able to assess patient, as patient was asleep.  CM will need to re-attempt.     ZOHRA TINEO  12:16 PM

## 2024-12-08 NOTE — PLAN OF CARE
Problem: Chronic Conditions and Co-morbidities  Goal: Patient's chronic conditions and co-morbidity symptoms are monitored and maintained or improved  Outcome: Not Progressing     Problem: Discharge Planning  Goal: Discharge to home or other facility with appropriate resources  Outcome: Not Progressing     Problem: Pain  Goal: Verbalizes/displays adequate comfort level or baseline comfort level  Outcome: Not Progressing     Problem: Safety - Adult  Goal: Free from fall injury  Outcome: Not Progressing  Flowsheets (Taken 12/8/2024 0900)  Free From Fall Injury: Instruct family/caregiver on patient safety     Problem: Safety - Medical Restraint  Goal: Remains free of injury from restraints (Restraint for Interference with Medical Device)  Description: INTERVENTIONS:  1. Determine that other, less restrictive measures have been tried or would not be effective before applying the restraint  2. Evaluate the patient's condition at the time of restraint application  3. Inform patient/family regarding the reason for restraint  4. Q2H: Monitor safety, psychosocial status, comfort, nutrition and hydration  Outcome: Not Progressing     Problem: Chronic Conditions and Co-morbidities  Goal: Patient's chronic conditions and co-morbidity symptoms are monitored and maintained or improved  Outcome: Not Progressing     Problem: Discharge Planning  Goal: Discharge to home or other facility with appropriate resources  Outcome: Not Progressing     Problem: Pain  Goal: Verbalizes/displays adequate comfort level or baseline comfort level  Outcome: Not Progressing     Problem: Safety - Adult  Goal: Free from fall injury  Outcome: Not Progressing  Flowsheets (Taken 12/8/2024 0900)  Free From Fall Injury: Instruct family/caregiver on patient safety     Problem: Safety - Medical Restraint  Goal: Remains free of injury from restraints (Restraint for Interference with Medical Device)  Description: INTERVENTIONS:  1. Determine that other, less  restrictive measures have been tried or would not be effective before applying the restraint  2. Evaluate the patient's condition at the time of restraint application  3. Inform patient/family regarding the reason for restraint  4. Q2H: Monitor safety, psychosocial status, comfort, nutrition and hydration  Outcome: Not Progressing

## 2024-12-08 NOTE — ED NOTES
0345 patient assessed at this time, no signs of distress. Patient changed and gowned. Purewick placed on pt. Sitter at bedside. MD made aware of patient's arrival   0415 Patient began screaming and trying to get out of bed, MD made aware orders placed.

## 2024-12-09 LAB
ALBUMIN SERPL-MCNC: 3.1 G/DL (ref 3.5–5)
ALBUMIN/GLOB SERPL: 0.9 (ref 1.1–2.2)
ALP SERPL-CCNC: 111 U/L (ref 45–117)
ALT SERPL-CCNC: 27 U/L (ref 12–78)
ANION GAP SERPL CALC-SCNC: 7 MMOL/L (ref 2–12)
APPEARANCE UR: CLEAR
AST SERPL-CCNC: 19 U/L (ref 15–37)
BACTERIA SPEC CULT: NORMAL
BACTERIA URNS QL MICRO: ABNORMAL /HPF
BASOPHILS # BLD: 0.1 K/UL (ref 0–0.1)
BASOPHILS NFR BLD: 1 % (ref 0–1)
BILIRUB SERPL-MCNC: 0.6 MG/DL (ref 0.2–1)
BILIRUB UR QL: NEGATIVE
BUN SERPL-MCNC: 24 MG/DL (ref 6–20)
BUN/CREAT SERPL: 14 (ref 12–20)
CALCIUM SERPL-MCNC: 9.9 MG/DL (ref 8.5–10.1)
CHLORIDE SERPL-SCNC: 115 MMOL/L (ref 97–108)
CO2 SERPL-SCNC: 22 MMOL/L (ref 21–32)
COLOR UR: ABNORMAL
CREAT SERPL-MCNC: 1.76 MG/DL (ref 0.55–1.02)
DIFFERENTIAL METHOD BLD: ABNORMAL
EOSINOPHIL # BLD: 0 K/UL (ref 0–0.4)
EOSINOPHIL NFR BLD: 0 % (ref 0–7)
EPITH CASTS URNS QL MICRO: ABNORMAL /LPF
ERYTHROCYTE [DISTWIDTH] IN BLOOD BY AUTOMATED COUNT: 15.2 % (ref 11.5–14.5)
GLOBULIN SER CALC-MCNC: 3.6 G/DL (ref 2–4)
GLUCOSE BLD STRIP.AUTO-MCNC: 121 MG/DL (ref 65–117)
GLUCOSE BLD STRIP.AUTO-MCNC: 143 MG/DL (ref 65–117)
GLUCOSE BLD STRIP.AUTO-MCNC: 153 MG/DL (ref 65–117)
GLUCOSE BLD STRIP.AUTO-MCNC: 194 MG/DL (ref 65–117)
GLUCOSE BLD STRIP.AUTO-MCNC: 197 MG/DL (ref 65–117)
GLUCOSE SERPL-MCNC: 194 MG/DL (ref 65–100)
GLUCOSE UR STRIP.AUTO-MCNC: NEGATIVE MG/DL
HCT VFR BLD AUTO: 48.9 % (ref 35–47)
HGB BLD-MCNC: 15.7 G/DL (ref 11.5–16)
HGB UR QL STRIP: NEGATIVE
HYALINE CASTS URNS QL MICRO: ABNORMAL /LPF (ref 0–5)
IMM GRANULOCYTES # BLD AUTO: 0.1 K/UL (ref 0–0.04)
IMM GRANULOCYTES NFR BLD AUTO: 1 % (ref 0–0.5)
KETONES UR QL STRIP.AUTO: NEGATIVE MG/DL
LEUKOCYTE ESTERASE UR QL STRIP.AUTO: ABNORMAL
LYME ANTIBODY: NEGATIVE
LYMPHOCYTES # BLD: 2.7 K/UL (ref 0.8–3.5)
LYMPHOCYTES NFR BLD: 30 % (ref 12–49)
MCH RBC QN AUTO: 29.8 PG (ref 26–34)
MCHC RBC AUTO-ENTMCNC: 32.1 G/DL (ref 30–36.5)
MCV RBC AUTO: 92.8 FL (ref 80–99)
MONOCYTES # BLD: 0.8 K/UL (ref 0–1)
MONOCYTES NFR BLD: 9 % (ref 5–13)
NEUTS SEG # BLD: 5.5 K/UL (ref 1.8–8)
NEUTS SEG NFR BLD: 59 % (ref 32–75)
NITRITE UR QL STRIP.AUTO: POSITIVE
NRBC # BLD: 0 K/UL (ref 0–0.01)
NRBC BLD-RTO: 0 PER 100 WBC
PH UR STRIP: 5.5 (ref 5–8)
PLATELET # BLD AUTO: 348 K/UL (ref 150–400)
PMV BLD AUTO: 10.1 FL (ref 8.9–12.9)
POTASSIUM SERPL-SCNC: 4 MMOL/L (ref 3.5–5.1)
PROT SERPL-MCNC: 6.7 G/DL (ref 6.4–8.2)
PROT UR STRIP-MCNC: ABNORMAL MG/DL
RBC # BLD AUTO: 5.27 M/UL (ref 3.8–5.2)
RBC #/AREA URNS HPF: ABNORMAL /HPF (ref 0–5)
SERVICE CMNT-IMP: ABNORMAL
SERVICE CMNT-IMP: NORMAL
SODIUM SERPL-SCNC: 144 MMOL/L (ref 136–145)
SP GR UR REFRACTOMETRY: 1.02 (ref 1–1.03)
URINE CULTURE IF INDICATED: ABNORMAL
UROBILINOGEN UR QL STRIP.AUTO: 0.2 EU/DL (ref 0.2–1)
WBC # BLD AUTO: 9.2 K/UL (ref 3.6–11)
WBC URNS QL MICRO: ABNORMAL /HPF (ref 0–4)

## 2024-12-09 PROCEDURE — 81001 URINALYSIS AUTO W/SCOPE: CPT

## 2024-12-09 PROCEDURE — 87088 URINE BACTERIA CULTURE: CPT

## 2024-12-09 PROCEDURE — 2580000003 HC RX 258: Performed by: STUDENT IN AN ORGANIZED HEALTH CARE EDUCATION/TRAINING PROGRAM

## 2024-12-09 PROCEDURE — 2580000003 HC RX 258: Performed by: HOSPITALIST

## 2024-12-09 PROCEDURE — 6360000002 HC RX W HCPCS: Performed by: HOSPITALIST

## 2024-12-09 PROCEDURE — 87086 URINE CULTURE/COLONY COUNT: CPT

## 2024-12-09 PROCEDURE — 87186 SC STD MICRODIL/AGAR DIL: CPT

## 2024-12-09 PROCEDURE — 2500000003 HC RX 250 WO HCPCS: Performed by: HOSPITALIST

## 2024-12-09 PROCEDURE — 51702 INSERT TEMP BLADDER CATH: CPT

## 2024-12-09 PROCEDURE — 6360000002 HC RX W HCPCS: Performed by: STUDENT IN AN ORGANIZED HEALTH CARE EDUCATION/TRAINING PROGRAM

## 2024-12-09 PROCEDURE — 85025 COMPLETE CBC W/AUTO DIFF WBC: CPT

## 2024-12-09 PROCEDURE — 51798 US URINE CAPACITY MEASURE: CPT

## 2024-12-09 PROCEDURE — 6370000000 HC RX 637 (ALT 250 FOR IP): Performed by: HOSPITALIST

## 2024-12-09 PROCEDURE — 82962 GLUCOSE BLOOD TEST: CPT

## 2024-12-09 PROCEDURE — 36415 COLL VENOUS BLD VENIPUNCTURE: CPT

## 2024-12-09 PROCEDURE — 80053 COMPREHEN METABOLIC PANEL: CPT

## 2024-12-09 PROCEDURE — 2060000000 HC ICU INTERMEDIATE R&B

## 2024-12-09 PROCEDURE — 6370000000 HC RX 637 (ALT 250 FOR IP): Performed by: STUDENT IN AN ORGANIZED HEALTH CARE EDUCATION/TRAINING PROGRAM

## 2024-12-09 RX ORDER — OLANZAPINE 5 MG/1
5 TABLET, ORALLY DISINTEGRATING ORAL 3 TIMES DAILY PRN
Status: DISCONTINUED | OUTPATIENT
Start: 2024-12-09 | End: 2024-12-09

## 2024-12-09 RX ORDER — OLANZAPINE 5 MG/1
5 TABLET, ORALLY DISINTEGRATING ORAL 3 TIMES DAILY PRN
Status: DISCONTINUED | OUTPATIENT
Start: 2024-12-09 | End: 2024-12-13 | Stop reason: HOSPADM

## 2024-12-09 RX ORDER — OLANZAPINE 5 MG/1
2.5 TABLET, ORALLY DISINTEGRATING ORAL DAILY
Status: DISCONTINUED | OUTPATIENT
Start: 2024-12-09 | End: 2024-12-09

## 2024-12-09 RX ORDER — ENOXAPARIN SODIUM 100 MG/ML
30 INJECTION SUBCUTANEOUS DAILY
Status: DISCONTINUED | OUTPATIENT
Start: 2024-12-10 | End: 2024-12-12

## 2024-12-09 RX ADMIN — METOPROLOL TARTRATE 5 MG: 1 INJECTION, SOLUTION INTRAVENOUS at 12:30

## 2024-12-09 RX ADMIN — METOPROLOL TARTRATE 5 MG: 1 INJECTION, SOLUTION INTRAVENOUS at 05:52

## 2024-12-09 RX ADMIN — SODIUM CHLORIDE, PRESERVATIVE FREE 10 ML: 5 INJECTION INTRAVENOUS at 09:38

## 2024-12-09 RX ADMIN — ATORVASTATIN CALCIUM 80 MG: 40 TABLET, FILM COATED ORAL at 23:40

## 2024-12-09 RX ADMIN — CLOPIDOGREL BISULFATE 75 MG: 75 TABLET ORAL at 09:41

## 2024-12-09 RX ADMIN — INSULIN LISPRO 2 UNITS: 100 INJECTION, SOLUTION INTRAVENOUS; SUBCUTANEOUS at 11:46

## 2024-12-09 RX ADMIN — INSULIN GLARGINE 15 UNITS: 100 INJECTION, SOLUTION SUBCUTANEOUS at 09:34

## 2024-12-09 RX ADMIN — SODIUM CHLORIDE: 9 INJECTION, SOLUTION INTRAVENOUS at 18:06

## 2024-12-09 RX ADMIN — ENOXAPARIN SODIUM 40 MG: 100 INJECTION SUBCUTANEOUS at 09:34

## 2024-12-09 RX ADMIN — CLONIDINE HYDROCHLORIDE 0.2 MG: 0.1 TABLET ORAL at 23:40

## 2024-12-09 RX ADMIN — HYDRALAZINE HYDROCHLORIDE 25 MG: 50 TABLET ORAL at 12:30

## 2024-12-09 RX ADMIN — HYDRALAZINE HYDROCHLORIDE 25 MG: 50 TABLET ORAL at 09:34

## 2024-12-09 RX ADMIN — LORAZEPAM 0.5 MG: 2 INJECTION INTRAMUSCULAR; INTRAVENOUS at 05:52

## 2024-12-09 RX ADMIN — HYDRALAZINE HYDROCHLORIDE 25 MG: 50 TABLET ORAL at 23:40

## 2024-12-09 RX ADMIN — SODIUM CHLORIDE: 9 INJECTION, SOLUTION INTRAVENOUS at 04:11

## 2024-12-09 RX ADMIN — ESCITALOPRAM OXALATE 20 MG: 10 TABLET ORAL at 09:35

## 2024-12-09 RX ADMIN — CLONIDINE HYDROCHLORIDE 0.2 MG: 0.1 TABLET ORAL at 09:41

## 2024-12-09 RX ADMIN — CEFTRIAXONE SODIUM 1000 MG: 1 INJECTION, POWDER, FOR SOLUTION INTRAMUSCULAR; INTRAVENOUS at 09:34

## 2024-12-09 RX ADMIN — SODIUM CHLORIDE, PRESERVATIVE FREE 10 ML: 5 INJECTION INTRAVENOUS at 23:41

## 2024-12-09 RX ADMIN — FAMOTIDINE 20 MG: 20 TABLET, FILM COATED ORAL at 09:34

## 2024-12-09 RX ADMIN — METOPROLOL SUCCINATE 75 MG: 50 TABLET, EXTENDED RELEASE ORAL at 09:35

## 2024-12-09 ASSESSMENT — PAIN SCALES - GENERAL: PAINLEVEL_OUTOF10: 0

## 2024-12-09 NOTE — PROGRESS NOTES
NSTE-ACS /chronic HFpEF  -trop 316-->313  -Compensated HFpEF  -Chart review shows chronically elevated troponins to 180s  -Echo 02/2022 -EF 65-70 with diastolic dysfunction  - Cards feels likely 2/2 acute elevated, possible stroke; no chest pain   - Continue medicla management with plavix and lipitor, metoprolol and BP management      NIDDM II with hyperglycemia  -Resume home regimen of Lantus 15 units daily  -SSI +Hypoglycemic protocols     CVA with resultant gait disturbances and intermittent vertigo  -?CVA recurrence  -mri pending  -wheelchair bound, lives at LTC facility   -fall precautions     Hypertension  -Continue clonidine, metoprolol and hydralazine  - IV metoprolol      CKD stage III  -Creatinine stable at baseline  -Monitor with daily BMP     Major depressive disorder  -Continue PTA Lexapro  - Appreciate Psychiatry recommendations        Code status: DNR  Prophylaxis: lovenox  Care Plan discussed with: patient, nursing, family  Anticipated Disposition: pending clinical improvement back to LTC  Inpatient  Cardiac monitoring: Telemetry  Central Line:            Social Determinants of Health     Tobacco Use: Low Risk  (12/7/2024)    Patient History     Smoking Tobacco Use: Never     Smokeless Tobacco Use: Never     Passive Exposure: Never   Alcohol Use: Patient Unable To Answer (12/7/2024)    AUDIT-C     Frequency of Alcohol Consumption: Patient unable to answer     Average Number of Drinks: Patient unable to answer     Frequency of Binge Drinking: Patient unable to answer   Financial Resource Strain: Not on file   Food Insecurity: No Food Insecurity (12/7/2024)    Hunger Vital Sign     Worried About Running Out of Food in the Last Year: Never true     Ran Out of Food in the Last Year: Never true   Transportation Needs: Not on file   Physical Activity: Not on file   Stress: Not on file   Social Connections: Not on file   Intimate Partner Violence: Not on file   Depression: Not at risk (11/10/2020)     Received from [facility]  (prior to 6/17/2023)    PHQ-2     Total Score PHQ 2: 0   Housing Stability: Not on file   Interpersonal Safety: Not At Risk (9/9/2024)    Interpersonal Safety Domain Source: IP Abuse Screening     Physical abuse: Denies     Verbal abuse: Denies     Emotional abuse: Denies     Financial abuse: Denies     Sexual abuse: Denies   Utilities: Not on file       Review of Systems:   Pertinent items are noted in HPI.       Vital Signs:    Last 24hrs VS reviewed since prior progress note. Most recent are:  Vitals:    12/09/24 1800   BP:    Pulse: 69   Resp:    Temp:    SpO2:          Intake/Output Summary (Last 24 hours) at 12/9/2024 1806  Last data filed at 12/9/2024 1230  Gross per 24 hour   Intake 810 ml   Output 950 ml   Net -140 ml        Physical Examination:     I had a face to face encounter with this patient and independently examined them on 12/9/2024 as outlined below:          General : alert, unable to obtain orientation, no acute distress, sleepy  HEENT: PEERL, EOMI, moist mucus membrane  Neck: supple, no JVD, no meningeal signs  Chest: Clear to auscultation bilaterally   CVS: S1 S2 heard, Capillary refill less than 2 seconds  Abd: soft/ non tender, non distended, BS physiological,   Ext: no clubbing, no cyanosis, no edema, brisk 2+ DP pulses  Neuro/Psych: CN 2-12 grossly intact, sensory grossly within normal limit  Skin: warm     Data Review:    Review and/or order of clinical lab test  Review and/or order of tests in the radiology section of CPT  Review and/or order of tests in the medicine section of CPT      I have personally and independently reviewed all pertinent labs, diagnostic studies, imaging, and have provided independent interpretation of the same.     Labs:     Recent Labs     12/07/24 1945 12/09/24  0604   WBC 7.5 9.2   HGB 16.1* 15.7   HCT 49.6* 48.9*    348     Recent Labs     12/07/24 1945 12/09/24  0604    144   K 4.2 4.0    115*

## 2024-12-09 NOTE — CARE COORDINATION
Care Management Initial Assessment       RUR: 16%  Readmission? No  1st IM letter given? Yes    JONO: Pt admitted from [facility]  - PT/OT pending - anticipate SNF  Transport: S    CM met with patient's ex-spouseFabien at bedside to introduce self and role. Pt lives at [facility] ([address], [place] [address];[phone]) in the assisted living.     ADLs: Requires assist with all ADLs, Pt is WC bound at baseline; Pt is at the highest level of care offered at [facility]  DME:   PCP follow up: Sees MD at facility (unknown)  Previous Home Health: Yes, unknown   Previous Skilled Nursing Facility: None  Previous Inpatient Rehab: [facility]  Insurance verified: Yes, Medicare A&B [facility]  Pharmacy: [facility]  Emergency Contact: Ex-Fabien [phone]    Discussed possible SNF placement with Pt's ex- and ECFabien. He is open to SNF and prefers [facility], followed by [facility] or [facility]. Will wait on PT/OT evals and send referrals if recommended.    CM attempted to reach [facility], phone number never reached a VM.    CM will follow patient progress and assist as needed with JONO plan.     12/09/24 1101   Service Assessment   Patient Orientation Unable to Assess   Cognition Severely Impaired   History Provided By Friend   Primary Caregiver Other (Comment)  ([facility])   Support Systems Family Members   Patient's Healthcare Decision Maker is: Named in Scanned ACP Document   PCP Verified by CM Yes  (physician at [facility] living)   Last Visit to PCP Within last 6 months   Prior Functional Level Assistance with the following:;Bathing;Dressing;Toileting;Cooking;Housework;Mobility;Shopping   Current Functional Level Assistance with the following:;Bathing;Dressing;Toileting;Cooking;Housework;Shopping;Mobility   Can patient return to prior living arrangement Unknown at present   Ability to make needs known: Poor   Family able to assist with home  care needs: No   Financial Resources Medicare   Social/Functional History   Type of Home Assisted living   Prior Level of Assist for ADLs Needs assistance   Toileting Needs assistance   Prior Level of Assist for Homemaking Needs assistance   Homemaking Responsibilities No   Ambulation Assistance Non-ambulatory   Prior Level of Assist for Transfers Needs assistance   Discharge Planning   Type of Residence Assisted living   Potential Assistance Needed Transportation;Skilled Nursing Facility   Patient expects to be discharged to: Skilled nursing facility   Services At/After Discharge   Mode of Transport at Discharge BLS   Condition of Participation: Discharge Planning   The Plan for Transition of Care is related to the following treatment goals: snf   The Patient and/or Patient Representative was provided with a Choice of Provider? Patient Representative   Name of the Patient Representative who was provided with the Choice of Provider and agrees with the Discharge Plan?  Fabien Leon   The Patient and/Or Patient Representative agree with the Discharge Plan? Yes   Freedom of Choice list was provided with basic dialogue that supports the patient's individualized plan of care/goals, treatment preferences, and shares the quality data associated with the providers?  Yes     Andreea Corbett M.S (Ally).DONG.

## 2024-12-09 NOTE — PLAN OF CARE
Problem: Chronic Conditions and Co-morbidities  Goal: Patient's chronic conditions and co-morbidity symptoms are monitored and maintained or improved  Outcome: Progressing  Flowsheets (Taken 12/9/2024 0800)  Care Plan - Patient's Chronic Conditions and Co-Morbidity Symptoms are Monitored and Maintained or Improved:   Monitor and assess patient's chronic conditions and comorbid symptoms for stability, deterioration, or improvement   Update acute care plan with appropriate goals if chronic or comorbid symptoms are exacerbated and prevent overall improvement and discharge   Collaborate with multidisciplinary team to address chronic and comorbid conditions and prevent exacerbation or deterioration     Problem: Discharge Planning  Goal: Discharge to home or other facility with appropriate resources  Outcome: Progressing  Flowsheets (Taken 12/9/2024 0800)  Discharge to home or other facility with appropriate resources:   Identify barriers to discharge with patient and caregiver   Arrange for needed discharge resources and transportation as appropriate   Identify discharge learning needs (meds, wound care, etc)     Problem: Pain  Goal: Verbalizes/displays adequate comfort level or baseline comfort level  Outcome: Progressing     Problem: Safety - Adult  Goal: Free from fall injury  Outcome: Progressing  Flowsheets (Taken 12/9/2024 0800)  Free From Fall Injury:   Instruct family/caregiver on patient safety   Based on caregiver fall risk screen, instruct family/caregiver to ask for assistance with transferring infant if caregiver noted to have fall risk factors     Problem: Safety - Medical Restraint  Goal: Remains free of injury from restraints (Restraint for Interference with Medical Device)  Description: INTERVENTIONS:  1. Determine that other, less restrictive measures have been tried or would not be effective before applying the restraint  2. Evaluate the patient's condition at the time of restraint application  3.  Inform patient/family regarding the reason for restraint  4. Q2H: Monitor safety, psychosocial status, comfort, nutrition and hydration  Outcome: Progressing

## 2024-12-09 NOTE — CONSULTS
if possible prior to olanzapine if she begins to titrate up with anxiety and agitation   Trazodone is safer for patients with possible dementia and for geriatric patients.  But still used with caution    Only for acute/emergent need to assist patient de-escalate from being a harm to herself or staff   Olanzapine 5 mg may be given disintegrating tablet or IM 3 times daily for only acute emergent need    Recommend minimizing any new medications unless absolutely necessary for the safety and acuity of the patient for the safety of surrounding provider or staff.    Psychiatry will follow up tomorrow on 12/10/2024 to evaluate how she managed overnight and management of psychiatric symptoms          Thank you for the opportunity to participate in the care of your patient. Please re-consult psychiatry as needed.

## 2024-12-09 NOTE — PROGRESS NOTES
Patient resting. Will follow up as necessary.   Rabbi Gabino Bean [facility] [place] [place] Provider

## 2024-12-09 NOTE — PROGRESS NOTES
PT Contact Note           Chart reviewed in prep for PT evaluation, patient cleared to be seen. Patient received in bed sleeping soundly, despite multiple attempts to rouse patient, patient lethargic and did not open eyes or answer questions. Patient total A x2 for repositioning in bed and did not wake with repositioning. Further session aborted as pt not able to participate in PT evaluation. Will follow up as able and medically appropriate.     Thank you,  Esme Avila, PT, DPT

## 2024-12-09 NOTE — BSMART NOTE
BSMART Liaison Team Note     LOS:  2 days     Patient goal(s) for today: take medications as prescribed, make needs known in an appropriate manner  BSMART Liaison team focus/goals: assess needs, provide support and education, coordinate care      Progress note: Liaison met f/f with pt in her room on the medical floor of [facility]. Nursing staff interacting with pt. Pt minimally communicative with nurses and this liaison. Pt repeatedly stating that she wants to go. Pt laying in her bed with her her eyes shut. She is in soft hand restraints and she asks for them to be taken off. She doesn't open her eyes for liaison. She says that she is okay when asked how she is feeling. She says that she is in the hospital. She appears to know her name. She returns to sleeping, and she will not arouse further.     Primary nurse reports that pt had been kicking and pulling with staff yesterday. A jasso had to be placed d/t fluid retention. Pt unable to receive her MRI as ordered.d/t her agitation. Pt reportedly had been awake and agitated all night last night. Pt reportedly shouting loudly. Pt reportedly fell to sleep this AM after being given IM Ativan and Haldol. Nurse reports that pt has been pushing out her food, and then spit out all 9 of her prescribed pills. Nurse reports that pt seems to be selectively speaking with staff, and then sleeping with others. Pt reportedly has exhibited AMS for a few days. Nurse reports pt seems oriented by person and place at [facility]. Pt reportedly had no reported issues or concerns prior to episode leading to this hospitalization.     Per ER physician's note on 12/7/2024: \"Patient is a 74 y.o. female who presents to the emergency department with EMS for complaints of patient with hypertension not taking meds and not acting herself. Symptoms have been present for at least 24 hours.  Reviewed patient's chart and she has 2 sons in the area.\" Also per ER physician's note: \"Sleeping more per son, going to  sleep at 5pm. Not acting herself for about a week. Stroke 3 years ago. No diagnosis of dementia. Does not walk anymore. Hasn't walked in a few years. In motorized wheelchair. Son is POA and consents for treatment and evaluation.\" Liaison will continue to monitor and to support.      Barriers to Discharge: Medical clearance     Outpatient provider(s): [facility]  Insurance info/prescription coverage: MEDICARE      Diagnosis:   Past Medical History:   Diagnosis Date    Arthritis     Chronic pain     Diabetes (HCC)     GERD (gastroesophageal reflux disease)     Hypertension     Ill-defined condition     ucerative colitis    Ulcerative colitis (HCC)     Vertigo      Plan: Liaison to monitor and to support. Please defer to psychiatric consult note for recommendations. .   Follow up Psych Consult placed? Yes.   Psychiatrist updated? no     Participating treatment team members: Karlee Leon, Sunny Barnes LCSW

## 2024-12-09 NOTE — PROGRESS NOTES
Patient refusing oral meds, refusing to eat breakfast.  Notified MD.  Crushed, she is also spitting back out.  Awaiting Psych consult.

## 2024-12-09 NOTE — PROGRESS NOTES
Occupational Therapy   12.09.2024    Chart reviewed in prep for OT evaluation, patient cleared to be seen. Patient received in bed sleeping soundly, despite multiple attempts to rouse patient, patient lethargic and did not open eyes or answer questions. Patient total A x2 for repositioning in bed. Further session aborted. Will follow up as able and medically appropriate. Thank you.     Giovanna Fung MS, OTR/L

## 2024-12-09 NOTE — PROGRESS NOTES
Pt slept most of the day. Did not give ZYPREXA, now PRN. Restraints removed at 1400p. Eating 25% of lunch and dinner (only eating vanilla ice cream). Pills in ice cream worked best for PO intake. Encourage oral fluid intake.

## 2024-12-09 NOTE — CARDIO/PULMONARY
Cardiac Rehab: Review of chart done on Karlee Leon for elevated troponin and suspected NSTEMI. The following note is from cardiology- Dr Hurtado:      Expand All Collapse All                                                                                      [facility] CARDIOLOGY  Cardiology Care Note                  [x]Initial visit     []Established visit      Patient Name: Karlee Leon - :1950 - MRN:[id number]  Primary Cardiologist: None  Consulting Cardiologist: Dr Shukri Hurtado       2024 4:44 PM      Reason for initial visit: Sinus tachycardia with elevated troponin     Majority of history obtained from records as patient is confused and agitated     Assessment/Plan/Discussion: Cardiology Attending:             ASSESSMENT     Elevated troponin most likely secondary to underlying stroke versus uncontrolled hypertension  Uncontrolled hypertension  Sinus tachycardia  Altered mental status with agitation and disorientation  Acute delirium   hyperlipidemia     PLAN   Patient presented to the hospital with altered mental status and delirium suspected stroke awaiting MRI brain CT head shows chronic microvascular angiopathy with cerebral atrophy  We were consulted as patient has elevated troponin which is most likely due to acute stroke versus hypertensive encephalopathy patient denies any chest pain  EKG shows normal sinus rhythm with nonspecific ST-T changes  At present I would continue conservative medical management with no ischemic workup needed at this moment  Continue Plavix for suspected stroke along with Lipitor for hyperlipidemia  Sinus tachycardia is most likely due to agitation and decreased appetite would recommend restarting metoprolol XL 75 mg once daily if patient refuses to take p.o. may consider IV as needed metoprolol  Hypertension mildly uncontrolled currently on hydralazine 25 3 times daily/clonidine 0.2 mg twice daily management as per hospitalist   Last blood pressure

## 2024-12-10 ENCOUNTER — APPOINTMENT (OUTPATIENT)
Facility: HOSPITAL | Age: 74
End: 2024-12-10
Payer: MEDICARE

## 2024-12-10 LAB
ANION GAP SERPL CALC-SCNC: 7 MMOL/L (ref 2–12)
BASOPHILS # BLD: 0.1 K/UL (ref 0–0.1)
BASOPHILS NFR BLD: 1 % (ref 0–1)
BUN SERPL-MCNC: 17 MG/DL (ref 6–20)
BUN/CREAT SERPL: 16 (ref 12–20)
CALCIUM SERPL-MCNC: 7.8 MG/DL (ref 8.5–10.1)
CENTROMERE B AB SER-ACNC: <0.2 AI (ref 0–0.9)
CHLORIDE SERPL-SCNC: 121 MMOL/L (ref 97–108)
CHROMATIN AB SERPL-ACNC: <0.2 AI (ref 0–0.9)
CO2 SERPL-SCNC: 19 MMOL/L (ref 21–32)
COMMENT:: NORMAL
CREAT SERPL-MCNC: 1.09 MG/DL (ref 0.55–1.02)
DIFFERENTIAL METHOD BLD: ABNORMAL
DSDNA AB SER-ACNC: <1 IU/ML (ref 0–9)
ENA JO1 AB SER-ACNC: <0.2 AI (ref 0–0.9)
ENA RNP AB SER-ACNC: 0.3 AI (ref 0–0.9)
ENA SCL70 AB SER-ACNC: <0.2 AI (ref 0–0.9)
ENA SM AB SER-ACNC: <0.2 AI (ref 0–0.9)
ENA SS-A AB SER-ACNC: <0.2 AI (ref 0–0.9)
ENA SS-B AB SER-ACNC: <0.2 AI (ref 0–0.9)
EOSINOPHIL # BLD: 0 K/UL (ref 0–0.4)
EOSINOPHIL NFR BLD: 0 % (ref 0–7)
ERYTHROCYTE [DISTWIDTH] IN BLOOD BY AUTOMATED COUNT: 14.8 % (ref 11.5–14.5)
GLUCOSE BLD STRIP.AUTO-MCNC: 142 MG/DL (ref 65–117)
GLUCOSE BLD STRIP.AUTO-MCNC: 156 MG/DL (ref 65–117)
GLUCOSE BLD STRIP.AUTO-MCNC: 158 MG/DL (ref 65–117)
GLUCOSE BLD STRIP.AUTO-MCNC: 200 MG/DL (ref 65–117)
GLUCOSE BLD STRIP.AUTO-MCNC: 218 MG/DL (ref 65–117)
GLUCOSE SERPL-MCNC: 178 MG/DL (ref 65–100)
HCT VFR BLD AUTO: 50.4 % (ref 35–47)
HGB BLD-MCNC: 15.8 G/DL (ref 11.5–16)
IMM GRANULOCYTES # BLD AUTO: 0.1 K/UL (ref 0–0.04)
IMM GRANULOCYTES NFR BLD AUTO: 1 % (ref 0–0.5)
LYMPHOCYTES # BLD: 1.7 K/UL (ref 0.8–3.5)
LYMPHOCYTES NFR BLD: 14 % (ref 12–49)
Lab: NORMAL
MAGNESIUM SERPL-MCNC: 1.5 MG/DL (ref 1.6–2.4)
MCH RBC QN AUTO: 29.6 PG (ref 26–34)
MCHC RBC AUTO-ENTMCNC: 31.3 G/DL (ref 30–36.5)
MCV RBC AUTO: 94.6 FL (ref 80–99)
MONOCYTES # BLD: 1.1 K/UL (ref 0–1)
MONOCYTES NFR BLD: 9 % (ref 5–13)
NEUTS SEG # BLD: 9.7 K/UL (ref 1.8–8)
NEUTS SEG NFR BLD: 75 % (ref 32–75)
NRBC # BLD: 0 K/UL (ref 0–0.01)
NRBC BLD-RTO: 0 PER 100 WBC
PLATELET # BLD AUTO: 292 K/UL (ref 150–400)
PMV BLD AUTO: 9.7 FL (ref 8.9–12.9)
POTASSIUM SERPL-SCNC: 3.5 MMOL/L (ref 3.5–5.1)
RBC # BLD AUTO: 5.33 M/UL (ref 3.8–5.2)
SERVICE CMNT-IMP: ABNORMAL
SODIUM SERPL-SCNC: 147 MMOL/L (ref 136–145)
SPECIMEN HOLD: NORMAL
WBC # BLD AUTO: 12.7 K/UL (ref 3.6–11)

## 2024-12-10 PROCEDURE — 83735 ASSAY OF MAGNESIUM: CPT

## 2024-12-10 PROCEDURE — 2500000003 HC RX 250 WO HCPCS: Performed by: STUDENT IN AN ORGANIZED HEALTH CARE EDUCATION/TRAINING PROGRAM

## 2024-12-10 PROCEDURE — 51798 US URINE CAPACITY MEASURE: CPT

## 2024-12-10 PROCEDURE — 2580000003 HC RX 258: Performed by: HOSPITALIST

## 2024-12-10 PROCEDURE — 85025 COMPLETE CBC W/AUTO DIFF WBC: CPT

## 2024-12-10 PROCEDURE — 6370000000 HC RX 637 (ALT 250 FOR IP): Performed by: STUDENT IN AN ORGANIZED HEALTH CARE EDUCATION/TRAINING PROGRAM

## 2024-12-10 PROCEDURE — 2580000003 HC RX 258: Performed by: STUDENT IN AN ORGANIZED HEALTH CARE EDUCATION/TRAINING PROGRAM

## 2024-12-10 PROCEDURE — 74177 CT ABD & PELVIS W/CONTRAST: CPT

## 2024-12-10 PROCEDURE — 97165 OT EVAL LOW COMPLEX 30 MIN: CPT

## 2024-12-10 PROCEDURE — 97530 THERAPEUTIC ACTIVITIES: CPT

## 2024-12-10 PROCEDURE — 6360000002 HC RX W HCPCS: Performed by: STUDENT IN AN ORGANIZED HEALTH CARE EDUCATION/TRAINING PROGRAM

## 2024-12-10 PROCEDURE — 74176 CT ABD & PELVIS W/O CONTRAST: CPT

## 2024-12-10 PROCEDURE — 6360000004 HC RX CONTRAST MEDICATION: Performed by: STUDENT IN AN ORGANIZED HEALTH CARE EDUCATION/TRAINING PROGRAM

## 2024-12-10 PROCEDURE — 6370000000 HC RX 637 (ALT 250 FOR IP): Performed by: HOSPITALIST

## 2024-12-10 PROCEDURE — 82962 GLUCOSE BLOOD TEST: CPT

## 2024-12-10 PROCEDURE — 2060000000 HC ICU INTERMEDIATE R&B

## 2024-12-10 PROCEDURE — 36415 COLL VENOUS BLD VENIPUNCTURE: CPT

## 2024-12-10 PROCEDURE — 97161 PT EVAL LOW COMPLEX 20 MIN: CPT

## 2024-12-10 PROCEDURE — 80048 BASIC METABOLIC PNL TOTAL CA: CPT

## 2024-12-10 PROCEDURE — 6370000000 HC RX 637 (ALT 250 FOR IP): Performed by: NURSE PRACTITIONER

## 2024-12-10 RX ORDER — MAGNESIUM SULFATE 1 G/100ML
1000 INJECTION INTRAVENOUS ONCE
Status: COMPLETED | OUTPATIENT
Start: 2024-12-10 | End: 2024-12-10

## 2024-12-10 RX ORDER — TRAZODONE HYDROCHLORIDE 50 MG/1
25 TABLET, FILM COATED ORAL EVERY 6 HOURS PRN
Status: DISCONTINUED | OUTPATIENT
Start: 2024-12-10 | End: 2024-12-10

## 2024-12-10 RX ORDER — IOPAMIDOL 755 MG/ML
100 INJECTION, SOLUTION INTRAVASCULAR
Status: COMPLETED | OUTPATIENT
Start: 2024-12-10 | End: 2024-12-10

## 2024-12-10 RX ORDER — BISACODYL 10 MG
10 SUPPOSITORY, RECTAL RECTAL ONCE
Status: COMPLETED | OUTPATIENT
Start: 2024-12-10 | End: 2024-12-10

## 2024-12-10 RX ORDER — DEXTROSE MONOHYDRATE, SODIUM CHLORIDE, AND POTASSIUM CHLORIDE 50; 1.49; 9 G/1000ML; G/1000ML; G/1000ML
INJECTION, SOLUTION INTRAVENOUS CONTINUOUS
Status: DISCONTINUED | OUTPATIENT
Start: 2024-12-10 | End: 2024-12-11

## 2024-12-10 RX ORDER — TRAZODONE HYDROCHLORIDE 50 MG/1
25 TABLET, FILM COATED ORAL EVERY 6 HOURS PRN
Status: DISCONTINUED | OUTPATIENT
Start: 2024-12-10 | End: 2024-12-13 | Stop reason: HOSPADM

## 2024-12-10 RX ADMIN — BISACODYL 10 MG: 10 SUPPOSITORY RECTAL at 16:05

## 2024-12-10 RX ADMIN — HYDRALAZINE HYDROCHLORIDE 25 MG: 50 TABLET ORAL at 21:57

## 2024-12-10 RX ADMIN — WATER 2.5 MG: 1 INJECTION INTRAMUSCULAR; INTRAVENOUS; SUBCUTANEOUS at 12:26

## 2024-12-10 RX ADMIN — HYDRALAZINE HYDROCHLORIDE 25 MG: 50 TABLET ORAL at 13:58

## 2024-12-10 RX ADMIN — SODIUM CHLORIDE: 9 INJECTION, SOLUTION INTRAVENOUS at 06:29

## 2024-12-10 RX ADMIN — ATORVASTATIN CALCIUM 80 MG: 40 TABLET, FILM COATED ORAL at 21:57

## 2024-12-10 RX ADMIN — POTASSIUM CHLORIDE, DEXTROSE MONOHYDRATE AND SODIUM CHLORIDE: 150; 5; 900 INJECTION, SOLUTION INTRAVENOUS at 20:01

## 2024-12-10 RX ADMIN — IOPAMIDOL 100 ML: 755 INJECTION, SOLUTION INTRAVENOUS at 18:35

## 2024-12-10 RX ADMIN — MAGNESIUM SULFATE 1000 MG: 1 INJECTION INTRAVENOUS at 18:54

## 2024-12-10 RX ADMIN — POLYETHYLENE GLYCOL 3350 17 G: 17 POWDER, FOR SOLUTION ORAL at 15:44

## 2024-12-10 RX ADMIN — HYDRALAZINE HYDROCHLORIDE 25 MG: 50 TABLET ORAL at 09:09

## 2024-12-10 RX ADMIN — INSULIN LISPRO 2 UNITS: 100 INJECTION, SOLUTION INTRAVENOUS; SUBCUTANEOUS at 21:58

## 2024-12-10 RX ADMIN — CEFTRIAXONE SODIUM 1000 MG: 1 INJECTION, POWDER, FOR SOLUTION INTRAMUSCULAR; INTRAVENOUS at 09:10

## 2024-12-10 RX ADMIN — OLANZAPINE 5 MG: 5 TABLET, ORALLY DISINTEGRATING ORAL at 23:22

## 2024-12-10 RX ADMIN — SODIUM CHLORIDE, PRESERVATIVE FREE 10 ML: 5 INJECTION INTRAVENOUS at 09:11

## 2024-12-10 RX ADMIN — CLONIDINE HYDROCHLORIDE 0.2 MG: 0.1 TABLET ORAL at 21:56

## 2024-12-10 NOTE — PLAN OF CARE
any fall with injury in the past year?: Unknown  Prior Level of Assist for ADLs: Needs assistance  Toileting: Needs assistance  Prior Level of Assist for Homemaking: Needs assistance  Homemaking Responsibilities: No  Prior Level of Assist for Ambulation: Non-ambulatory - confinded to hopsital bed  Prior Level of Assist for Transfers: Needs assistance  Active : No    Hand Dominance: right     EXAMINATION OF PERFORMANCE DEFICITS:    Cognitive/Behavioral Status:  Orientation  Overall Orientation Status: Impaired  Orientation Level: Oriented to person;Disoriented to place;Disoriented to time;Disoriented to situation (partially to person, able to recall first name only)  Cognition  Overall Cognitive Status: Exceptions  Arousal/Alertness: Impaired  Following Commands: Follows one step commands with increased time;Follows one step commands with repetition  Attention Span: Difficulty attending to directions;Difficulty dividing attention  Safety Judgement: Impaired  Insights: Not aware of deficits  Initiation: Requires cues for all  Sequencing: Requires cues for all    Skin: appears grossly intact    Edema: none noted in BUEs    Hearing:        Vision/Perceptual:    Vision - Basic Assessment  Prior Vision: Wears glasses all the time        Perception  Overall Perceptual Status: Impaired  Initiation: Cues to initiate tasks    Range of Motion:   AROM: Generally decreased, functional  PROM: Within functional limits    Strength:  Strength: Generally decreased, functional    Coordination:  Coordination: Generally decreased, functional    Tone & Sensation:   Tone: Normal  Sensation:  (unable to formally assess)    Functional Mobility and Transfers for ADLs:    Bed Mobility:     Bed Mobility Training  Bed Mobility Training: Yes  Supine to Sit: Moderate assistance;Maximum assistance;Assist X1;Additional time  Scooting: Maximum assistance;Assist X1;Additional time    Transfers:      Transfer Training  Transfer Training:                                                                                                      Pain Rating:  Reporting no pain    Pain Intervention(s):   pain is at a level acceptable to the patient    Activity Tolerance:   Fair     After treatment:   Patient left in no apparent distress sitting up in chair, Call bell within reach, Bed/ chair alarm activated, Updated patient's board on functional status and mobility recommendations, and RN aware    COMMUNICATION/EDUCATION:   The patient's plan of care was discussed with: physical therapist and registered nurse    Patient Education  Education Given To: Patient  Education Provided: Role of Therapy;ADL Adaptive Strategies;Transfer Training  Education Method: Verbal  Barriers to Learning: Cognition;Hearing  Education Outcome: Unable to demonstrate understanding;Continued education needed    Thank you for this referral.  Giovanna Fung OT  Minutes: 21    Occupational Therapy Evaluation Charge Determination   History Examination Decision-Making   LOW Complexity : Brief history review  HIGH Complexity: 5 Performance deficits relating to physical, cognitive, or psychosocial skills that result in activity limitations and/or participation restrictions  HIGH Complexity: Patient presents with comorbidities that affect occupational performance.  Significant modifications of tasks or assistance (eg. physical or verbal) with assessment (s) is necessary to enable pt to complete evaluation   Based on the above components, the patient evaluation is determined to be of the following complexity level: Medium

## 2024-12-10 NOTE — PLAN OF CARE
Problem: Physical Therapy - Adult  Goal: By Discharge: Performs mobility at highest level of function for planned discharge setting.  See evaluation for individualized goals.  Description: FUNCTIONAL STATUS PRIOR TO ADMISSION: needs clarification- per chart, patient wheelchair bound    HOME SUPPORT PRIOR TO ADMISSION: assisted living    Physical Therapy Goals  Initiated 12/10/2024  1.  Patient will move from supine to sit and sit to supine in bed with minimal assistance within 7 day(s).    2.  Patient will perform sit to stand with minimal assistance within 7 day(s).  3.  Patient will transfer from bed to chair and chair to bed with minimal assistance using the least restrictive device within 7 day(s).  4.  Patient will ambulate with minimal assistance for 50 feet with the least restrictive device within 7 day(s).     Outcome: Not Progressing       PHYSICAL THERAPY EVALUATION    Patient: Karlee Leon (74 y.o. female)  Date: 12/10/2024  Primary Diagnosis: Hyperglycemia [R73.9]  Elevated troponin [R79.89]  Acute encephalopathy [G93.40]  Altered mental status, unspecified altered mental status type [R41.82]  Hypertension, unspecified type [I10]       Precautions: Restrictions/Precautions: Fall Risk, Skin                      ASSESSMENT : Karlee Leon is a 74 y.o. female with past medical history of CVA with resultant gait disturbances and intermittent vertigo, chronic arthritic pain, diabetes, GERD, hypertension, ulcerative colitis who was brought to [facility] emergency department from long-term care facilities for concerns of increasing confusion.   DEFICITS/IMPAIRMENTS:   The patient is limited by decreased functional mobility, strength, body mechanics, activity tolerance, endurance, safety awareness, cognition, command following, attention/concentration, coordination, balance who would benefit from PT to address these impairments. Patient needing max assist for transfers, only oriented to person.

## 2024-12-10 NOTE — PLAN OF CARE
2000 Report received from ALONDRA Romero and ALONDRA Baltazar. Patient lethargic, wakes to verbal stimuli and shouts \"let me go back to sleep!\" Pt refusing medications or participation in assessment, safety measures in help.    Shift summary.    No acute events noted. Pt resting in bed comfortably, call bell left within reach.    0732 Bedside shift change report given to ALONDRA Romero and ALONDRA Baltazar by ALONDRA Truong. Report included the following information SBAR, Kardex, MAR, Accordion, and Recent Results and Cardiac Rhythm NSR.    Problem: Chronic Conditions and Co-morbidities  Goal: Patient's chronic conditions and co-morbidity symptoms are monitored and maintained or improved  12/9/2024 2307 by Alexi Polk RN  Outcome: Progressing  12/9/2024 1118 by Delaney Armstrong RN  Outcome: Progressing  Flowsheets (Taken 12/9/2024 0800)  Care Plan - Patient's Chronic Conditions and Co-Morbidity Symptoms are Monitored and Maintained or Improved:   Monitor and assess patient's chronic conditions and comorbid symptoms for stability, deterioration, or improvement   Update acute care plan with appropriate goals if chronic or comorbid symptoms are exacerbated and prevent overall improvement and discharge   Collaborate with multidisciplinary team to address chronic and comorbid conditions and prevent exacerbation or deterioration     Problem: Neurosensory - Adult  Goal: Achieves stable or improved neurological status  Outcome: Progressing     Problem: Cardiovascular - Adult  Goal: Maintains optimal cardiac output and hemodynamic stability  Outcome: Progressing     Problem: Skin/Tissue Integrity - Adult  Goal: Skin integrity remains intact  Outcome: Progressing     Problem: Musculoskeletal - Adult  Goal: Return mobility to safest level of function  Outcome: Progressing     Problem: Gastrointestinal - Adult  Goal: Minimal or absence of nausea and vomiting  Outcome: Progressing     Problem: Genitourinary - Adult  Goal: Absence of urinary

## 2024-12-10 NOTE — PLAN OF CARE
Problem: Chronic Conditions and Co-morbidities  Goal: Patient's chronic conditions and co-morbidity symptoms are monitored and maintained or improved  12/10/2024 1010 by Delaney Armstrong RN  Outcome: Progressing  Flowsheets (Taken 12/10/2024 0800)  Care Plan - Patient's Chronic Conditions and Co-Morbidity Symptoms are Monitored and Maintained or Improved:   Monitor and assess patient's chronic conditions and comorbid symptoms for stability, deterioration, or improvement   Collaborate with multidisciplinary team to address chronic and comorbid conditions and prevent exacerbation or deterioration   Update acute care plan with appropriate goals if chronic or comorbid symptoms are exacerbated and prevent overall improvement and discharge  12/9/2024 2307 by Aleix Polk RN  Outcome: Progressing     Problem: Discharge Planning  Goal: Discharge to home or other facility with appropriate resources  12/10/2024 1010 by Delaney Armstrong RN  Outcome: Progressing  Flowsheets (Taken 12/10/2024 0800)  Discharge to home or other facility with appropriate resources:   Identify barriers to discharge with patient and caregiver   Arrange for needed discharge resources and transportation as appropriate   Identify discharge learning needs (meds, wound care, etc)  12/9/2024 2307 by Alexi Polk RN  Outcome: Progressing     Problem: Pain  Goal: Verbalizes/displays adequate comfort level or baseline comfort level  12/10/2024 1010 by Delaney Armstrong RN  Outcome: Progressing  12/9/2024 2307 by Alexi Polk RN  Outcome: Progressing     Problem: Safety - Adult  Goal: Free from fall injury  12/10/2024 1010 by Delaney Armstrong RN  Outcome: Progressing  Flowsheets (Taken 12/10/2024 0800)  Free From Fall Injury:   Instruct family/caregiver on patient safety   Based on caregiver fall risk screen, instruct family/caregiver to ask for assistance with transferring infant if caregiver noted to have fall risk factors  12/9/2024 2307 by  discharge:   Assess and monitor for signs and symptoms of infection   Monitor lab/diagnostic results   Monitor all insertion sites i.e., indwelling lines, tubes and drains  12/9/2024 2307 by Alexi Polk RN  Outcome: Progressing     Problem: Confusion  Goal: Confusion, delirium, dementia, or psychosis is improved or at baseline  Description: INTERVENTIONS:  1. Assess for possible contributors to thought disturbance, including medications, impaired vision or hearing, underlying metabolic abnormalities, dehydration, psychiatric diagnoses, and notify attending LIP  2. [place] high risk fall precautions, as indicated  3. Provide frequent short contacts to provide reality reorientation, refocusing and direction  4. Decrease environmental stimuli, including noise as appropriate  5. Monitor and intervene to maintain adequate nutrition, hydration, elimination, sleep and activity  6. If unable to ensure safety without constant attention obtain sitter and review sitter guidelines with assigned personnel  7. Initiate Psychosocial CNS and Spiritual Care consult, as indicated  12/10/2024 1010 by Delaney Armstrong RN  Outcome: Progressing  Flowsheets (Taken 12/10/2024 0800)  Effect of thought disturbance (confusion, delirium, dementia, or psychosis) are managed with adequate functional status:   Assess for contributors to thought disturbance, including medications, impaired vision or hearing, underlying metabolic abnormalities, dehydration, psychiatric diagnoses, notify LIP   [place] high risk fall precautions, as indicated   Provide frequent short contacts to provide reality reorientation, refocusing and direction  12/9/2024 2307 by Alexi Polk, RN  Outcome: Progressing

## 2024-12-10 NOTE — PROGRESS NOTES
Review of patient charting of Delaney Armstrong and in agreement as preceptor. Heather Enriquez RN

## 2024-12-10 NOTE — CONSULTS
Comprehensive Nutrition Assessment    Type and Reason for Visit: Initial, Consult    Nutrition Recommendations/Plan:   Continue regular diet, provide meal assistance/tray set up as able  Added Glucerna BID (chocolate), Magic Cup once daily (vanilla)  Continue to adjust insulin to promote euglycemia   Please document % intake of meals in flowsheets        Malnutrition Assessment:  Malnutrition Status:  Severe malnutrition (12/10/24 1256)    Context:  Acute Illness     Findings of the 6 clinical characteristics of malnutrition:  Energy Intake:  50% or less of estimated energy requirements for 5 or more days  Weight Loss:  Greater than 7.5% over 3 months     Body Fat Loss:  Unable to assess     Muscle Mass Loss:  Unable to assess    Fluid Accumulation:  No fluid accumulation     Strength:  Not Performed       Nutrition Assessment:    PMH of CVA with resultant gait disturbances and intermittent vertigo, chronic arthritic pain, DM, GERD, HTN, ulcerative colitis, NKA.     Presents from LTC facility with concerns of increasing confusion. Noted that pt has been declining her medications, alert to person only. Pt on IVF 2/2 insufficient oral intake. Psych consulted, plan to start 25 mg trazodone.     RD consulted for poor appetite/intake x >/=5 days. RD visited pt at bedside however, information obtain limited 2/2 pt mentation. Breakfast tray set up for pt however, pt had not touched tray during time of RD visit. Pt frequently stating \"I want to go home\". Pt c/o being cold, requesting blanket. RD provided warmed blanket for pt. Pt amenable to start chocolate Ensure, will also trail vanilla magic cup as it is documented by nursing that pt only eating vanilla ice-cream (~25% of meals). ~12% wt loss x 3 months via EMR - no wt method recorded for 9/9/24 weight however, nutritionally significant and severe if accurate. BG values trending down since admission (as high as 426 POA). Will hold off on adding CHO restriction d/t pt

## 2024-12-10 NOTE — PLAN OF CARE
Problem: Skin/Tissue Integrity - Adult  Goal: Skin integrity remains intact  Outcome: Progressing  Flowsheets (Taken 12/10/2024 0800)  Skin Integrity Remains Intact:   Monitor for areas of redness and/or skin breakdown   Assess vascular access sites hourly     Problem: Safety - Medical Restraint  Goal: Remains free of injury from restraints (Restraint for Interference with Medical Device)  Description: INTERVENTIONS:  1. Determine that other, less restrictive measures have been tried or would not be effective before applying the restraint  2. Evaluate the patient's condition at the time of restraint application  3. Inform patient/family regarding the reason for restraint  4. Q2H: Monitor safety, psychosocial status, comfort, nutrition and hydration  Outcome: Progressing

## 2024-12-10 NOTE — CONSULTS
confusion.  She does have a history of major depression and takes Lexapro    PAST PSYCHIATRIC HISTORY: She takes Lexapro for depression    SUBSTANCE ABUSE HISTORY: Unknown    PSYCHOSOCIAL HISTORY: Living in local area at nursing facility    MENTAL STATUS EXAM:   Karlee Leon is a 74 y.o. White (non-) female who is resting in bed and groggy with her eyes closed no distress at this time.  Unable to mental status exam at this time.    DIAGNOSTIC IMPRESSION: Delirium secondary to underlying metabolic etiology associated with periods of psychosis    ASSESSMENT/PLAN:     Update 12/10/2024    Due to patient's markedly increased lucidity today during my interview and assessment at approximately 2 PM and responding well to olanzapine    I will initiate olanzapine 2.5 mg disintegrating tablet daily    With as needed while managing best and lowest daily dose possible for best benefit for her symptoms of agitation and some psychosis    Please utilize trazodone 25 mg 4 times daily as needed for breakthrough anxiety if willing to take oral first     Continue management of delirium by nonpharmacological means like-reorient, soothing music, oriented to day and nighttime.    Any additional medication especially a sedating medication or antipsychotic could contribute to worsening delirium and prolonged course of delirium have not proven to be beneficial but more of a burden load according to latest evidence.    Please try trazodone 25 mg as needed as ordered if possible prior to olanzapine if she begins to titrate up with anxiety and agitation   Trazodone is safer for patients with possible dementia and for geriatric patients.  But still used with caution    Only for acute/emergent need to assist patient de-escalate from being a harm to herself or staff   Olanzapine 5 mg may be given disintegrating tablet or IM 3 times daily for only acute emergent need    Recommend minimizing any new medications unless absolutely  necessary for the safety and acuity of the patient for the safety of surrounding provider or staff.    Psychiatry will follow up tomorrow on 12/10/2024 to evaluate how she managed overnight and management of psychiatric symptoms          Thank you for the opportunity to participate in the care of your patient. Please re-consult psychiatry as needed.

## 2024-12-10 NOTE — CARE COORDINATION
Transition of Care Plan:    SNF - Referrals pending with [facility]  - 3 night stay already met  Transport; BLS    RUR: 16%  Prior Level of Functioning: Pt lives at [facility] ([address], [place] [address];[phone]) in the assisted living - was a level 4 of care which is the highest care level provided at [facility] and is WC bound  Disposition: SNF  RHEA: >48 hours  If SNF or IPR: Date FOC offered: 12/9  Date FOC received: 12/9  Accepting facility: Pending  Follow up appointments:   DME needed: tbd  Transportation at discharge: BLS  IM/IMM Medicare/ letter given: 12/8  Caregiver Contact: Ex-spouse, Fabien Leon [phone]   Discharge Caregiver contacted prior to discharge? Yes  Care Conference needed? no  Barriers to discharge: Medical, 48 hours, Pt altered, MRI pending, psych following    CM sent SNF referrals to [facility] per discussion with Pt's ex-spouse Fabien on 12/9. Pt 3 night stay has been met, still likely here through end of week.     CM will follow.    JANEEN Ovalle (Ally).

## 2024-12-10 NOTE — PROGRESS NOTES
[facility]                                                                                          Hospitalist Progress Note  Tiffany Howell MD  Office Phone: [phone]        Date of Service:  12/10/2024  NAME:  Karlee Leon  :  1950  MRN:  [id number]       Admission Summary:   Karlee Leon is a 74 y.o. female with past medical history of CVA with resultant gait disturbances and intermittent vertigo, chronic arthritic pain, diabetes, GERD, hypertension, ulcerative colitis who was brought to [facility] emergency department from long-term care facilities for concerns of increasing confusion.  Per chart review, she was seen increasingly confused at her long-term care facility over the last day.  She has reportedly also been refusing her medications has been alert to person only.  I have also been concerns creased somnolence.       Interval history / Subjective:   Patient evaluated on morning rounds. She was acutely agitated and screaming out, attempting to get out of the bed. She required both chemical and physical restraints for her safety.     Updated patient's mPOA, her ex-, Fabien Leon.      Assessment & Plan:         AMS / Encephalopathy  -CT head shows chronic microangiopathic changes  -Extensive workup in ED to include ammonia, TSH, mag, UDS, lactic acid, UA- unremak  -Treated with Geodon 10 mg in ED  - Psych consulsted, try trazodone 25mg as needed prior to zyprexa   - MARYCHUY (pending)  - RPR, HIV, ESR, B12 and Folate, Lyme: negative  - CRP mildly elevated, etiology unclear    -Plan for MRI brain ordered, but patient unable to tolerate at this time due to agitation   -Consider neurology consultation   - Continue IVFs as patient is not taking in sufficient oral intake, will transition to D5 0.9% NaCl with Kcl   - Dietary consultation   - AMS may be 2/2 large pelvic mass, see below   - Continue soft wrist restraints for patient safety   -  brisk 2+ DP pulses  Neuro/Psych: CN 2-12 grossly intact, sensory grossly within normal limit  Skin: warm     Data Review:    Review and/or order of clinical lab test  Review and/or order of tests in the radiology section of CPT  Review and/or order of tests in the medicine section of CPT      I have personally and independently reviewed all pertinent labs, diagnostic studies, imaging, and have provided independent interpretation of the same.     Labs:     Recent Labs     12/09/24  0604 12/10/24  1645   WBC 9.2 12.7*   HGB 15.7 15.8   HCT 48.9* 50.4*    292     Recent Labs     12/07/24 1945 12/09/24  0604 12/10/24  1645    144 147*   K 4.2 4.0 3.5    115* 121*   CO2 27 22 19*   BUN 24* 24* 17   MG 1.9  --  1.5*     Recent Labs     12/07/24 1945 12/09/24 0604   ALT 30 27   GLOB 3.9 3.6     No results for input(s): \"INR\", \"APTT\" in the last 72 hours.    Invalid input(s): \"PTP\"   No results for input(s): \"TIBC\" in the last 72 hours.    Invalid input(s): \"FE\", \"PSAT\", \"FERR\"   No results found for: \"RBCF\"   No results for input(s): \"PH\", \"PCO2\", \"PO2\" in the last 72 hours.  No results for input(s): \"CPK\" in the last 72 hours.    Invalid input(s): \"CPKMB\", \"CKNDX\", \"TROIQ\"  Lab Results   Component Value Date/Time    CHOL 194 03/01/2022 12:37 AM    HDL 53 03/01/2022 12:37 AM    .4 03/01/2022 12:37 AM     No results found for: \"GLUCPOC\"  [unfilled]    Notes reviewed from all clinical/nonclinical/nursing services involved in patient's clinical care. Care coordination discussions were held with appropriate clinical/nonclinical/ nursing providers based on care coordination needs.         Patients current active Medications were reviewed, considered, added and adjusted based on the clinical condition today.      Home Medications were reconciled to the best of my ability given all available resources at the time of admission. Route is PO if not otherwise noted.      Medications Reviewed:     Current

## 2024-12-10 NOTE — PROGRESS NOTES
Orders received, chart reviewed and patient evaluated by occupational therapy. Pending progression with skilled acute occupational therapy, recommend:    Continue to assess pending progress - SNF vs. Back to custodial with staff assist pending progress and staff ability to assist    Recommend with nursing patient to complete as able in order to maintain strength, endurance and independence: OOB to chair 3x/day, ADLs with assistance and performing toileting with 2 assist on BSC. Thank you for your assistance.     Full evaluation to follow.

## 2024-12-11 PROBLEM — E43 SEVERE MALNUTRITION (HCC): Status: ACTIVE | Noted: 2024-12-11

## 2024-12-11 LAB
ANION GAP SERPL CALC-SCNC: 8 MMOL/L (ref 2–12)
BACTERIA SPEC CULT: ABNORMAL
BUN SERPL-MCNC: 19 MG/DL (ref 6–20)
BUN/CREAT SERPL: 14 (ref 12–20)
CALCIUM SERPL-MCNC: 8.5 MG/DL (ref 8.5–10.1)
CC UR VC: ABNORMAL
CEA SERPL-MCNC: 2.6 NG/ML
CHLORIDE SERPL-SCNC: 113 MMOL/L (ref 97–108)
CO2 SERPL-SCNC: 22 MMOL/L (ref 21–32)
CREAT SERPL-MCNC: 1.4 MG/DL (ref 0.55–1.02)
CRP SERPL-MCNC: 5.59 MG/DL (ref 0–0.3)
ERYTHROCYTE [DISTWIDTH] IN BLOOD BY AUTOMATED COUNT: 15 % (ref 11.5–14.5)
GLUCOSE BLD STRIP.AUTO-MCNC: 153 MG/DL (ref 65–117)
GLUCOSE BLD STRIP.AUTO-MCNC: 181 MG/DL (ref 65–117)
GLUCOSE BLD STRIP.AUTO-MCNC: 297 MG/DL (ref 65–117)
GLUCOSE BLD STRIP.AUTO-MCNC: 300 MG/DL (ref 65–117)
GLUCOSE SERPL-MCNC: 239 MG/DL (ref 65–100)
HCT VFR BLD AUTO: 44.9 % (ref 35–47)
HGB BLD-MCNC: 14.1 G/DL (ref 11.5–16)
MAGNESIUM SERPL-MCNC: 1.9 MG/DL (ref 1.6–2.4)
MCH RBC QN AUTO: 29.9 PG (ref 26–34)
MCHC RBC AUTO-ENTMCNC: 31.4 G/DL (ref 30–36.5)
MCV RBC AUTO: 95.1 FL (ref 80–99)
NRBC # BLD: 0 K/UL (ref 0–0.01)
NRBC BLD-RTO: 0 PER 100 WBC
PHOSPHATE SERPL-MCNC: 3.2 MG/DL (ref 2.6–4.7)
PLATELET # BLD AUTO: 262 K/UL (ref 150–400)
POTASSIUM SERPL-SCNC: 4.5 MMOL/L (ref 3.5–5.1)
RBC # BLD AUTO: 4.72 M/UL (ref 3.8–5.2)
SERVICE CMNT-IMP: ABNORMAL
SODIUM SERPL-SCNC: 143 MMOL/L (ref 136–145)
WBC # BLD AUTO: 8.5 K/UL (ref 3.6–11)

## 2024-12-11 PROCEDURE — 6370000000 HC RX 637 (ALT 250 FOR IP): Performed by: STUDENT IN AN ORGANIZED HEALTH CARE EDUCATION/TRAINING PROGRAM

## 2024-12-11 PROCEDURE — 2580000003 HC RX 258: Performed by: STUDENT IN AN ORGANIZED HEALTH CARE EDUCATION/TRAINING PROGRAM

## 2024-12-11 PROCEDURE — 83735 ASSAY OF MAGNESIUM: CPT

## 2024-12-11 PROCEDURE — 6360000002 HC RX W HCPCS: Performed by: STUDENT IN AN ORGANIZED HEALTH CARE EDUCATION/TRAINING PROGRAM

## 2024-12-11 PROCEDURE — 2500000003 HC RX 250 WO HCPCS: Performed by: STUDENT IN AN ORGANIZED HEALTH CARE EDUCATION/TRAINING PROGRAM

## 2024-12-11 PROCEDURE — 86301 IMMUNOASSAY TUMOR CA 19-9: CPT

## 2024-12-11 PROCEDURE — 85027 COMPLETE CBC AUTOMATED: CPT

## 2024-12-11 PROCEDURE — 84100 ASSAY OF PHOSPHORUS: CPT

## 2024-12-11 PROCEDURE — 86304 IMMUNOASSAY TUMOR CA 125: CPT

## 2024-12-11 PROCEDURE — 97530 THERAPEUTIC ACTIVITIES: CPT

## 2024-12-11 PROCEDURE — 6370000000 HC RX 637 (ALT 250 FOR IP): Performed by: NURSE PRACTITIONER

## 2024-12-11 PROCEDURE — 6370000000 HC RX 637 (ALT 250 FOR IP): Performed by: HOSPITALIST

## 2024-12-11 PROCEDURE — 86140 C-REACTIVE PROTEIN: CPT

## 2024-12-11 PROCEDURE — 97535 SELF CARE MNGMENT TRAINING: CPT

## 2024-12-11 PROCEDURE — 2060000000 HC ICU INTERMEDIATE R&B

## 2024-12-11 PROCEDURE — 82962 GLUCOSE BLOOD TEST: CPT

## 2024-12-11 PROCEDURE — 82378 CARCINOEMBRYONIC ANTIGEN: CPT

## 2024-12-11 PROCEDURE — 80048 BASIC METABOLIC PNL TOTAL CA: CPT

## 2024-12-11 RX ORDER — SODIUM CHLORIDE 9 MG/ML
INJECTION, SOLUTION INTRAVENOUS CONTINUOUS
Status: DISPENSED | OUTPATIENT
Start: 2024-12-11 | End: 2024-12-12

## 2024-12-11 RX ORDER — OLANZAPINE 5 MG/1
2.5 TABLET, ORALLY DISINTEGRATING ORAL NIGHTLY
Status: DISCONTINUED | OUTPATIENT
Start: 2024-12-11 | End: 2024-12-13 | Stop reason: HOSPADM

## 2024-12-11 RX ADMIN — ATORVASTATIN CALCIUM 80 MG: 40 TABLET, FILM COATED ORAL at 21:51

## 2024-12-11 RX ADMIN — SODIUM CHLORIDE, PRESERVATIVE FREE 10 ML: 5 INJECTION INTRAVENOUS at 08:51

## 2024-12-11 RX ADMIN — INSULIN LISPRO 2 UNITS: 100 INJECTION, SOLUTION INTRAVENOUS; SUBCUTANEOUS at 12:21

## 2024-12-11 RX ADMIN — INSULIN LISPRO 6 UNITS: 100 INJECTION, SOLUTION INTRAVENOUS; SUBCUTANEOUS at 16:19

## 2024-12-11 RX ADMIN — HYDRALAZINE HYDROCHLORIDE 25 MG: 50 TABLET ORAL at 21:51

## 2024-12-11 RX ADMIN — INSULIN LISPRO 4 UNITS: 100 INJECTION, SOLUTION INTRAVENOUS; SUBCUTANEOUS at 08:33

## 2024-12-11 RX ADMIN — CLOPIDOGREL BISULFATE 75 MG: 75 TABLET ORAL at 08:32

## 2024-12-11 RX ADMIN — POLYETHYLENE GLYCOL 3350 17 G: 17 POWDER, FOR SOLUTION ORAL at 18:33

## 2024-12-11 RX ADMIN — SODIUM CHLORIDE: 9 INJECTION, SOLUTION INTRAVENOUS at 13:48

## 2024-12-11 RX ADMIN — HYDRALAZINE HYDROCHLORIDE 25 MG: 50 TABLET ORAL at 08:31

## 2024-12-11 RX ADMIN — POTASSIUM CHLORIDE, DEXTROSE MONOHYDRATE AND SODIUM CHLORIDE: 150; 5; 900 INJECTION, SOLUTION INTRAVENOUS at 13:13

## 2024-12-11 RX ADMIN — CLONIDINE HYDROCHLORIDE 0.2 MG: 0.1 TABLET ORAL at 21:52

## 2024-12-11 RX ADMIN — OLANZAPINE 2.5 MG: 5 TABLET, ORALLY DISINTEGRATING ORAL at 12:34

## 2024-12-11 RX ADMIN — METOPROLOL SUCCINATE 75 MG: 50 TABLET, EXTENDED RELEASE ORAL at 08:32

## 2024-12-11 RX ADMIN — INSULIN GLARGINE 15 UNITS: 100 INJECTION, SOLUTION SUBCUTANEOUS at 08:32

## 2024-12-11 RX ADMIN — FAMOTIDINE 20 MG: 20 TABLET, FILM COATED ORAL at 08:32

## 2024-12-11 RX ADMIN — ESCITALOPRAM OXALATE 20 MG: 10 TABLET ORAL at 08:31

## 2024-12-11 RX ADMIN — ENOXAPARIN SODIUM 30 MG: 100 INJECTION SUBCUTANEOUS at 08:39

## 2024-12-11 RX ADMIN — HYDRALAZINE HYDROCHLORIDE 25 MG: 50 TABLET ORAL at 14:00

## 2024-12-11 RX ADMIN — CLONIDINE HYDROCHLORIDE 0.2 MG: 0.1 TABLET ORAL at 08:43

## 2024-12-11 RX ADMIN — SODIUM CHLORIDE, PRESERVATIVE FREE 10 ML: 5 INJECTION INTRAVENOUS at 22:00

## 2024-12-11 RX ADMIN — CEFTRIAXONE SODIUM 1000 MG: 1 INJECTION, POWDER, FOR SOLUTION INTRAMUSCULAR; INTRAVENOUS at 08:50

## 2024-12-11 RX ADMIN — OLANZAPINE 2.5 MG: 5 TABLET, ORALLY DISINTEGRATING ORAL at 21:52

## 2024-12-11 ASSESSMENT — PAIN SCALES - GENERAL: PAINLEVEL_OUTOF10: 0

## 2024-12-11 NOTE — PROGRESS NOTES
Patient busy with staff. Will follow up as necessary.  Rabbi Gabino Bean, [facility] [place], Muslim Provider

## 2024-12-11 NOTE — PLAN OF CARE
Problem: Physical Therapy - Adult  Goal: By Discharge: Performs mobility at highest level of function for planned discharge setting.  See evaluation for individualized goals.  Description: FUNCTIONAL STATUS PRIOR TO ADMISSION: needs clarification- per chart, patient wheelchair bound    HOME SUPPORT PRIOR TO ADMISSION: assisted living    Physical Therapy Goals  Initiated 12/10/2024  1.  Patient will move from supine to sit and sit to supine in bed with minimal assistance within 7 day(s).    2.  Patient will perform sit to stand with minimal assistance within 7 day(s).  3.  Patient will transfer from bed to chair and chair to bed with minimal assistance using the least restrictive device within 7 day(s).  4.  Patient will ambulate with minimal assistance for 50 feet with the least restrictive device within 7 day(s).     Outcome: Not Progressing     PHYSICAL THERAPY TREATMENT    Patient: Karlee Leon (74 y.o. female)  Date: 12/11/2024  Diagnosis: Hyperglycemia [R73.9]  Elevated troponin [R79.89]  Acute encephalopathy [G93.40]  Altered mental status, unspecified altered mental status type [R41.82]  Hypertension, unspecified type [I10] Acute encephalopathy      Precautions: Fall Risk, Skin                      ASSESSMENT:  Patient continues to benefit from skilled PT services and is not progressing towards goals. Patient limited by cognition, difficulty participating with transfers secondary to anxiety. Patient needed total assist with modified stand pivot transfer with total assist.     Addendum: performed modified stand pivot transfer chair to bed with total assist; sit to supine with total assist. Left with nursing.      PLAN:  Patient continues to benefit from skilled intervention to address the above impairments.  Continue treatment per established plan of care.    Recommendations for staff mobility and toileting assistance:  Recommend that staff assists the patient to meet their mobility and care needs at

## 2024-12-11 NOTE — CONSULTS
[facility]  PSYCHIATRY CONSULT NOTE:    Name: Karlee Leon  MR#: [id number]  : 1950  ACCOUNT#: [id number]  ADMIT DATE: 2024    REASON FOR CONSULT: AMS    Interval update    2024-patient seen for a follow-up while resting in recliner next to her bed.  RN was alongside with tech in order to get her blood sugar checked.  Patient was somnolent initially but was easily aroused and once she was more awake began to ask \"why am I still here why do I have to be here\".  She did answer some questions appropriately but was increasing in lucidity as she woke up.  Otherwise she continues to be confused about \"I dont want that!\",  When her tech and nurse    12/10/2024-patient seen for a follow-up while resting in bed and was easily arousable by calling her name.  She no longer is needing restrains and appears to be relaxed and comfortable.  She engages in dialogue and responds appropriately after a small delay sometimes.  She asked me to help her screwed up in bed.  She remained pleasant and calm throughout my stay and appropriate.  She denied feeling hungry or having any pain or any other symptoms that may be a source of agitation at this time.    Patient was given olanzapine today 2.5 IM around 1230 and nursing reports that she had done well with this but remained groggy for a couple hours after prior to administration was fitful and restless with some \" screaming\"-this has been her baseline waxing and waning from somnolent to very agitated for the last 48 hours    However today during my interview with her about 2 hours after given olanzapine she was engaging as I described above and this was the best I have seen her.      2024-patient seen today while lying in bed appears comfortable and is groggy but arousable.  She will quickly fall back asleep however after calling her name and she will wake up for brief periods.  She does have moments of lucidity and tells me about her dog named srinath.   She also tells me that the son is in her eyes and asked me to close the blind.  This is an improvement from what nursing has reported previous behavior and orientation or communication.  She was given Haldol as ordered with poor response reportedly \"did not touch her\".  This morning Ativan was given at approximately 6 AM prior to MRI.  It is currently ~3 PM and she is still somnolent as noted with groggy presentation if arousable to then fall back asleep.  She has been refusing medications but her nurse did crush them up and give them mixed with applesauce earlier today and was able to get her to take them.   Her presentation appears to be quite waxing and waning and labile with periods of lucidity where she can communicate appropriately to her screaming for \"7 hours in a row and the Haldol did not touch it\".    Today once I was able to arouse her she did pull at her wrist and was restrained due to being at risk for injury and climbing out of the bed.  Once we talked for a minute she began to increase urgency and asking me that in telling me and demanding to undo her restraints.  She was redirectable and told her I would go get the nurse and help and she responded well to this.  When the nurse and I returned and took the restraints off and asked if she would like to eat some food she was intermittently saying yes but then falling asleep again.    HISTORY OF PRESENTING COMPLAINT:  Karlee Leon is a 74 y.o. female currently admitted to [facility] neuro telemetry for medical treatment.  She is experiencing delirium with workup for underlying etiology likely secondary to metabolic multifactorial etiology.  Psychiatry was asked to evaluate and help manage for any possible psychiatric symptoms or underlying psychiatric acute symptoms.  She was seen and her nurse was at bedside with her reporting that she had \"just settled down for the first time\".  She did not easily aroused when lightly calling her name but her

## 2024-12-11 NOTE — CONSULTS
bicarb-citric acid (EFFER-K) effervescent tablet 40 mEq  40 mEq Oral PRN    Or    potassium chloride 10 mEq/100 mL IVPB (Peripheral Line)  10 mEq IntraVENous PRN    magnesium sulfate 2000 mg in 50 mL IVPB premix  2,000 mg IntraVENous PRN    ondansetron (ZOFRAN-ODT) disintegrating tablet 4 mg  4 mg Oral Q8H PRN    Or    ondansetron (ZOFRAN) injection 4 mg  4 mg IntraVENous Q6H PRN    polyethylene glycol (GLYCOLAX) packet 17 g  17 g Oral Daily PRN    acetaminophen (TYLENOL) tablet 650 mg  650 mg Oral Q6H PRN    Or    acetaminophen (TYLENOL) suppository 650 mg  650 mg Rectal Q6H PRN    insulin lispro (HUMALOG,ADMELOG) injection vial 0-8 Units  0-8 Units SubCUTAneous 4x Daily AC & HS    glucose chewable tablet 16 g  4 tablet Oral PRN    dextrose bolus 10% 125 mL  125 mL IntraVENous PRN    Or    dextrose bolus 10% 250 mL  250 mL IntraVENous PRN    glucagon injection 1 mg  1 mg SubCUTAneous PRN    dextrose 10 % infusion   IntraVENous Continuous PRN    metoprolol succinate (TOPROL XL) extended release tablet 75 mg  75 mg Oral Daily    [Held by provider] metoprolol (LOPRESSOR) injection 5 mg  5 mg IntraVENous Q8H    escitalopram (LEXAPRO) tablet 20 mg  20 mg Oral Daily    hydrALAZINE (APRESOLINE) tablet 25 mg  25 mg Oral TID    cloNIDine (CATAPRES) tablet 0.2 mg  0.2 mg Oral BID    LORazepam (ATIVAN) injection 0.5 mg  0.5 mg IntraVENous Q6H PRN     No Known Allergies     Review of Systems  Pertinent items are noted in the History of Present Illness. , 10 point ROS    OBJECTIVE:    Physical Exam  /61   Pulse 58   Temp 97.5 °F (36.4 °C) (Oral)   Resp 12   Ht 1.6 m (5' 2.99\")   Wt 71.6 kg (157 lb 13.6 oz)   SpO2 98%   BMI 27.97 kg/m²   General appearance: alert, appears stated age, cooperative, and no distress.  Does not appear oriented to time and place  Eyes: conjunctivae/corneas clear. PERRL, EOM's intact. Fundi benign.  Back: symmetric, no curvature. ROM normal. No CVA tenderness.  Lungs: clear to auscultation  accident) (HCC)    Spinal instabilities of lumbar region    Dizziness    Acute encephalopathy    Altered mental status    Troponin I above reference range    Primary hypertension    Severe malnutrition (HCC)       I reviewed with Karlee Leon her medical records, physical exam, and review of symptoms.     74 y.o. female with a past medical history of CVA with resultant gait disturbances and intermittent vertigo, chronic arthritic pain, diabetes, GERD, hypertension, ulcerative colitis who was brought to [facility] emergency department from long-term care facilities for concerns of increasing confusion. Workup revealed that the patient has a significantly enlarged uterus.  Uterus likely enlarged dur to fibroids.  Though unable to find previous studies, patient's ex- does report a history of fibroids.  Patient not oriented enough to answer whether she has had a hysterectomy or recent vaginal bleeding.  On the CT, it is reassuring that there is no ascites, lymphadenopathy or carcinomatosis.   It does not appear that the uterus is causing any bowel obstruction.  It is possible that the uterus could contribute to some urinary retention. Tumor markers were ordered. The uterus should probably be removed via hysterectomy at some point though this is not urgent.  Surgery would likely require an open procedure considering the size of the uterus.  This can be scheduled outpatient once the patient is medically optimized.  Will continue to follow during this admission.  Spoke with patient's primary decision maker, Fabien Leon.       Signed By: Aicha Quinones PA-C     12/11/2024/12:23 PM

## 2024-12-11 NOTE — PLAN OF CARE
Problem: Physical Therapy - Adult  Goal: By Discharge: Performs mobility at highest level of function for planned discharge setting.  See evaluation for individualized goals.  Description: FUNCTIONAL STATUS PRIOR TO ADMISSION: needs clarification- per chart, patient wheelchair bound    HOME SUPPORT PRIOR TO ADMISSION: assisted living    Physical Therapy Goals  Initiated 12/10/2024  1.  Patient will move from supine to sit and sit to supine in bed with minimal assistance within 7 day(s).    2.  Patient will perform sit to stand with minimal assistance within 7 day(s).  3.  Patient will transfer from bed to chair and chair to bed with minimal assistance using the least restrictive device within 7 day(s).  4.  Patient will ambulate with minimal assistance for 50 feet with the least restrictive device within 7 day(s).     12/11/2024 1257 by Salty Bingham, PT  Outcome: Not Progressing     Problem: Physical Therapy - Adult  Goal: By Discharge: Performs mobility at highest level of function for planned discharge setting.  See evaluation for individualized goals.  Description: FUNCTIONAL STATUS PRIOR TO ADMISSION: needs clarification- per chart, patient wheelchair bound    HOME SUPPORT PRIOR TO ADMISSION: assisted living    Physical Therapy Goals  Initiated 12/10/2024  1.  Patient will move from supine to sit and sit to supine in bed with minimal assistance within 7 day(s).    2.  Patient will perform sit to stand with minimal assistance within 7 day(s).  3.  Patient will transfer from bed to chair and chair to bed with minimal assistance using the least restrictive device within 7 day(s).  4.  Patient will ambulate with minimal assistance for 50 feet with the least restrictive device within 7 day(s).     12/11/2024 1257 by Salty Bingham, PT  Outcome: Not Progressing

## 2024-12-11 NOTE — CARE COORDINATION
Transition of Care Plan:    SNF - [facility] has accepted; Referrals pending with [facility] and [facility]  - 3 night stay already met    Transport; BLS    RUR: 16%  Prior Level of Functioning: Pt lives at [facility] ([address], [place] [address];[phone]) in the assisted living - was a level 4 of care which is the highest care level provided at [facility] and is WC bound  Disposition: SNF  RHEA: >48 hours  If SNF or IPR: Date FOC offered: 12/9  Date FOC received: 12/9  Accepting facility: [facility]  Follow up appointments: TBD  DME needed: tbd  Transportation at discharge: BLS  IM/IMM Medicare/ letter given: 12/8  Caregiver Contact: Ex-spouse, Fabien Leon [phone]   Discharge Caregiver contacted prior to discharge? Yes  Care Conference needed? no  Barriers to discharge: Medical, 48 hours, Psych following, pt with pelvic mass    [facility]; pending Pt behaviors and bed availability at time of DC. Referrals are still pending with [facility] and [facility].     CM will continue to follow.    JANEEN Ovalle (Ally).

## 2024-12-11 NOTE — PROGRESS NOTES
Reviewed:     Current Facility-Administered Medications   Medication Dose Route Frequency    OLANZapine zydis (ZYPREXA) disintegrating tablet 2.5 mg  2.5 mg Oral Nightly    0.9 % sodium chloride infusion   IntraVENous Continuous    [START ON 12/12/2024] cefTRIAXone (ROCEPHIN) 1,000 mg in sterile water 10 mL IV syringe  1,000 mg IntraVENous Q24H    traZODone (DESYREL) tablet 25 mg  25 mg Oral Q6H PRN    enoxaparin Sodium (LOVENOX) injection 30 mg  30 mg SubCUTAneous Daily    OLANZapine zydis (ZYPREXA) disintegrating tablet 5 mg  5 mg Oral TID PRN    atorvastatin (LIPITOR) tablet 80 mg  80 mg Oral Nightly    clopidogrel (PLAVIX) tablet 75 mg  75 mg Oral Daily    famotidine (PEPCID) tablet 20 mg  20 mg Oral Daily    insulin glargine (LANTUS) injection vial 15 Units  15 Units SubCUTAneous Daily    sodium chloride flush 0.9 % injection 5-40 mL  5-40 mL IntraVENous 2 times per day    sodium chloride flush 0.9 % injection 5-40 mL  5-40 mL IntraVENous PRN    0.9 % sodium chloride infusion   IntraVENous PRN    potassium chloride (KLOR-CON) extended release tablet 40 mEq  40 mEq Oral PRN    Or    potassium bicarb-citric acid (EFFER-K) effervescent tablet 40 mEq  40 mEq Oral PRN    Or    potassium chloride 10 mEq/100 mL IVPB (Peripheral Line)  10 mEq IntraVENous PRN    magnesium sulfate 2000 mg in 50 mL IVPB premix  2,000 mg IntraVENous PRN    ondansetron (ZOFRAN-ODT) disintegrating tablet 4 mg  4 mg Oral Q8H PRN    Or    ondansetron (ZOFRAN) injection 4 mg  4 mg IntraVENous Q6H PRN    polyethylene glycol (GLYCOLAX) packet 17 g  17 g Oral Daily PRN    acetaminophen (TYLENOL) tablet 650 mg  650 mg Oral Q6H PRN    Or    acetaminophen (TYLENOL) suppository 650 mg  650 mg Rectal Q6H PRN    insulin lispro (HUMALOG,ADMELOG) injection vial 0-8 Units  0-8 Units SubCUTAneous 4x Daily AC & HS    glucose chewable tablet 16 g  4 tablet Oral PRN    dextrose bolus 10% 125 mL  125 mL IntraVENous PRN    Or    dextrose bolus 10% 250 mL  250 mL  IntraVENous PRN    glucagon injection 1 mg  1 mg SubCUTAneous PRN    dextrose 10 % infusion   IntraVENous Continuous PRN    metoprolol succinate (TOPROL XL) extended release tablet 75 mg  75 mg Oral Daily    [Held by provider] metoprolol (LOPRESSOR) injection 5 mg  5 mg IntraVENous Q8H    escitalopram (LEXAPRO) tablet 20 mg  20 mg Oral Daily    hydrALAZINE (APRESOLINE) tablet 25 mg  25 mg Oral TID    cloNIDine (CATAPRES) tablet 0.2 mg  0.2 mg Oral BID    LORazepam (ATIVAN) injection 0.5 mg  0.5 mg IntraVENous Q6H PRN     ______________________________________________________________________  EXPECTED LENGTH OF STAY: 6  ACTUAL LENGTH OF STAY:          4                 Tiffany Howell MD

## 2024-12-11 NOTE — PLAN OF CARE
with max A x2 with fair sitting balance.  Total A to chair with SPT.   Once up in chair, facilitated grooming task to wash face which she completed with set up and supervision.  Left up in chair with chair alarm.     She is limited with impaired standing balance, mobility, generalized strength, act tolerance, cognition(orientation, command following, STM/LTM, safety awareness) impacting her ability to complete self care and self care transfers.  Recommend continued skilled OT services.     Returned later in afternoon to facilitate transfer back to chair with total A chair > bed and total Ax1-2 for sit > supine.  She voiced need to void, provided education on jasso catheter however poor understanding.  Refusing \"to pee in the bed.\" Nurse present and providing care.            PLAN :  Patient continues to benefit from skilled intervention to address the above impairments.  Continue treatment per established plan of care to address goals.    Recommend with staff: set up meals, bed in chair position for meals; total A transfers---questionable if oscar steady is appropriate and may invoke confusion and fear??    Recommend next OT session: progress POC    Recommendation for discharge: (in order for the patient to meet his/her long term goals):   Moderate intensity short-term skilled occupational therapy up to 5x/week    Other factors to consider for discharge: poor safety awareness, impaired cognition, high risk for falls, not safe to be alone, and concern for safely navigating or managing the home environment    IF patient discharges home will need the following DME:  wc level at [facility]       SUBJECTIVE:   Patient stated “I dont want to fall.”    OBJECTIVE DATA SUMMARY:   Cognitive/Behavioral Status:  Orientation  Overall Orientation Status: Impaired  Orientation Level: Oriented to person;Disoriented to place;Disoriented to time;Disoriented to situation  Cognition  Overall Cognitive Status: Exceptions  Arousal/Alertness:

## 2024-12-12 ENCOUNTER — APPOINTMENT (OUTPATIENT)
Facility: HOSPITAL | Age: 74
End: 2024-12-12
Payer: MEDICARE

## 2024-12-12 LAB
ANION GAP SERPL CALC-SCNC: 2 MMOL/L (ref 2–12)
BUN SERPL-MCNC: 19 MG/DL (ref 6–20)
BUN/CREAT SERPL: 15 (ref 12–20)
CALCIUM SERPL-MCNC: 9.2 MG/DL (ref 8.5–10.1)
CANCER AG125 SERPL-ACNC: 7 U/ML (ref 1.5–35)
CHLORIDE SERPL-SCNC: 115 MMOL/L (ref 97–108)
CO2 SERPL-SCNC: 23 MMOL/L (ref 21–32)
CREAT SERPL-MCNC: 1.24 MG/DL (ref 0.55–1.02)
ERYTHROCYTE [DISTWIDTH] IN BLOOD BY AUTOMATED COUNT: 14.8 % (ref 11.5–14.5)
GLUCOSE BLD STRIP.AUTO-MCNC: 142 MG/DL (ref 65–117)
GLUCOSE BLD STRIP.AUTO-MCNC: 162 MG/DL (ref 65–117)
GLUCOSE BLD STRIP.AUTO-MCNC: 209 MG/DL (ref 65–117)
GLUCOSE BLD STRIP.AUTO-MCNC: 290 MG/DL (ref 65–117)
GLUCOSE SERPL-MCNC: 132 MG/DL (ref 65–100)
HCT VFR BLD AUTO: 44.3 % (ref 35–47)
HGB BLD-MCNC: 14.1 G/DL (ref 11.5–16)
MAGNESIUM SERPL-MCNC: 2 MG/DL (ref 1.6–2.4)
MCH RBC QN AUTO: 29.6 PG (ref 26–34)
MCHC RBC AUTO-ENTMCNC: 31.8 G/DL (ref 30–36.5)
MCV RBC AUTO: 93.1 FL (ref 80–99)
NRBC # BLD: 0 K/UL (ref 0–0.01)
NRBC BLD-RTO: 0 PER 100 WBC
PHOSPHATE SERPL-MCNC: 3.5 MG/DL (ref 2.6–4.7)
PLATELET # BLD AUTO: 273 K/UL (ref 150–400)
PMV BLD AUTO: 10 FL (ref 8.9–12.9)
POTASSIUM SERPL-SCNC: 4.3 MMOL/L (ref 3.5–5.1)
RBC # BLD AUTO: 4.76 M/UL (ref 3.8–5.2)
SERVICE CMNT-IMP: ABNORMAL
SODIUM SERPL-SCNC: 140 MMOL/L (ref 136–145)
WBC # BLD AUTO: 7.8 K/UL (ref 3.6–11)

## 2024-12-12 PROCEDURE — 6360000002 HC RX W HCPCS: Performed by: STUDENT IN AN ORGANIZED HEALTH CARE EDUCATION/TRAINING PROGRAM

## 2024-12-12 PROCEDURE — 2060000000 HC ICU INTERMEDIATE R&B

## 2024-12-12 PROCEDURE — 70551 MRI BRAIN STEM W/O DYE: CPT

## 2024-12-12 PROCEDURE — 80048 BASIC METABOLIC PNL TOTAL CA: CPT

## 2024-12-12 PROCEDURE — 6370000000 HC RX 637 (ALT 250 FOR IP): Performed by: HOSPITALIST

## 2024-12-12 PROCEDURE — 99233 SBSQ HOSP IP/OBS HIGH 50: CPT | Performed by: PHYSICIAN ASSISTANT

## 2024-12-12 PROCEDURE — 2580000003 HC RX 258: Performed by: STUDENT IN AN ORGANIZED HEALTH CARE EDUCATION/TRAINING PROGRAM

## 2024-12-12 PROCEDURE — 6370000000 HC RX 637 (ALT 250 FOR IP): Performed by: NURSE PRACTITIONER

## 2024-12-12 PROCEDURE — 82962 GLUCOSE BLOOD TEST: CPT

## 2024-12-12 PROCEDURE — 85027 COMPLETE CBC AUTOMATED: CPT

## 2024-12-12 PROCEDURE — 83735 ASSAY OF MAGNESIUM: CPT

## 2024-12-12 PROCEDURE — 6370000000 HC RX 637 (ALT 250 FOR IP): Performed by: STUDENT IN AN ORGANIZED HEALTH CARE EDUCATION/TRAINING PROGRAM

## 2024-12-12 PROCEDURE — 84100 ASSAY OF PHOSPHORUS: CPT

## 2024-12-12 RX ORDER — ENOXAPARIN SODIUM 100 MG/ML
40 INJECTION SUBCUTANEOUS DAILY
Status: DISCONTINUED | OUTPATIENT
Start: 2024-12-13 | End: 2024-12-13 | Stop reason: HOSPADM

## 2024-12-12 RX ORDER — LORAZEPAM 2 MG/ML
1 INJECTION INTRAMUSCULAR
Status: COMPLETED | OUTPATIENT
Start: 2024-12-12 | End: 2024-12-12

## 2024-12-12 RX ADMIN — ATORVASTATIN CALCIUM 80 MG: 40 TABLET, FILM COATED ORAL at 20:32

## 2024-12-12 RX ADMIN — SODIUM CHLORIDE, PRESERVATIVE FREE 10 ML: 5 INJECTION INTRAVENOUS at 21:33

## 2024-12-12 RX ADMIN — INSULIN GLARGINE 15 UNITS: 100 INJECTION, SOLUTION SUBCUTANEOUS at 08:51

## 2024-12-12 RX ADMIN — METOPROLOL SUCCINATE 75 MG: 50 TABLET, EXTENDED RELEASE ORAL at 08:43

## 2024-12-12 RX ADMIN — CLONIDINE HYDROCHLORIDE 0.2 MG: 0.1 TABLET ORAL at 20:32

## 2024-12-12 RX ADMIN — CLONIDINE HYDROCHLORIDE 0.2 MG: 0.1 TABLET ORAL at 10:19

## 2024-12-12 RX ADMIN — FAMOTIDINE 20 MG: 20 TABLET, FILM COATED ORAL at 08:41

## 2024-12-12 RX ADMIN — CEFTRIAXONE SODIUM 1000 MG: 1 INJECTION, POWDER, FOR SOLUTION INTRAMUSCULAR; INTRAVENOUS at 08:35

## 2024-12-12 RX ADMIN — LORAZEPAM 1 MG: 2 INJECTION INTRAMUSCULAR; INTRAVENOUS at 22:33

## 2024-12-12 RX ADMIN — INSULIN LISPRO 4 UNITS: 100 INJECTION, SOLUTION INTRAVENOUS; SUBCUTANEOUS at 18:11

## 2024-12-12 RX ADMIN — HYDRALAZINE HYDROCHLORIDE 25 MG: 50 TABLET ORAL at 20:32

## 2024-12-12 RX ADMIN — ACETAMINOPHEN 650 MG: 325 TABLET ORAL at 10:19

## 2024-12-12 RX ADMIN — OLANZAPINE 2.5 MG: 5 TABLET, ORALLY DISINTEGRATING ORAL at 20:32

## 2024-12-12 RX ADMIN — ESCITALOPRAM OXALATE 20 MG: 10 TABLET ORAL at 08:40

## 2024-12-12 RX ADMIN — SODIUM CHLORIDE: 9 INJECTION, SOLUTION INTRAVENOUS at 00:01

## 2024-12-12 RX ADMIN — HYDRALAZINE HYDROCHLORIDE 25 MG: 50 TABLET ORAL at 08:49

## 2024-12-12 RX ADMIN — CLOPIDOGREL BISULFATE 75 MG: 75 TABLET ORAL at 08:42

## 2024-12-12 ASSESSMENT — PAIN SCALES - GENERAL
PAINLEVEL_OUTOF10: 2
PAINLEVEL_OUTOF10: 0

## 2024-12-12 ASSESSMENT — PAIN DESCRIPTION - LOCATION: LOCATION: NECK

## 2024-12-12 NOTE — CARE COORDINATION
Transition of Care Plan:    SNF - [facility] - pending mRI  RN to call report to [phone] Room 124    Transport; BLS    RUR: 16%  Prior Level of Functioning: Pt lives at [facility] ([address], [place] [address];[phone]) in the assisted living - was a level 4 of care which is the highest care level provided at [facility] and is WC bound  Disposition: SNF  RHEA: >48 hours  If SNF or IPR: Date FOC offered: 12/9  Date FOC received: 12/9  Accepting facility: [facility]  Follow up appointments: TBD  DME needed: tbd  Transportation at discharge: BLS  IM/IMM Medicare/ letter given: 12/8  Caregiver Contact: Ex-spouse, Fabien Leon [phone]   Discharge Caregiver contacted prior to discharge? Yes  Care Conference needed? no  Barriers to discharge: RHEA 12/13    [facility] does not have a bed available today. MONICA spoke with Pt's decision maker - Fabien Leon and he is in agreement to have additional referral sent to [facility]; referral sent in [facility].     9:10am: CM informed that [facility] now has a bed for Pt today. CM spoke with Pt's HCDM - Fabien - who prefers [facility] over [facility].     [facility] scheduled for 3pm.    10am: Pt still pending MRI for discharge, [facility] cancelled.    JANEEN Ovalle (Ally).

## 2024-12-12 NOTE — PROGRESS NOTES
[facility] GYNECOLOGIC ONCOLOGY  [address], [address]  [place] [address]  P [phone]  F [phone]       Karlee Leon       [id number]  1950    Admitted 2024 Date 2024     Admit dx: Hyperglycemia [R73.9]  Elevated troponin [R79.89]  Acute encephalopathy [G93.40]  Altered mental status, unspecified altered mental status type [R41.82]  Hypertension, unspecified type [I10]      HPI:    Karlee Leon is a 74 y.o.  female with a past medical history of CVA with resultant gait disturbances and intermittent vertigo, chronic arthritic pain, diabetes, GERD, hypertension, ulcerative colitis who was brought to [facility] emergency department from [facility] for concerns of increasing confusion.  According to the patient's ex-, her confusion has been developing over the last several weeks but became more obvious more recently when the patient began refusing her medication.     Patient was admitted for further workup and medical management. Noted to have abdominal distention and a CT scan was performed.  CT shows large colonic stool burden. CT also shows enlarged uterus measuring about 20 x 11 x 11cm. Gyn oncology was consulted for further recommendations    SUBJECTIVE:    Karlee Leon is being seen for an enlarged uterus.  Patient remains a poor historian.  Only oriented to self.   No complaints of pain today. States that her abdomen does not feel any different than her normal.  Does deny vaginal bleeding.  Spoke with patient's ex-, Fabien, on 2024.  He stated that the patient does have a history of fibroids. He also said that patient seemed to be at her baseline when speaking to her on 2024.      OB/GYN ROS: no complaints today.   OB/GYN history: . 1 vaginal delivery () no complications  No abdominal or pelvic surgeries.     Patient Active Problem List   Diagnosis    BALA (acute kidney injury) (HCC)    CVA (cerebral

## 2024-12-12 NOTE — PROGRESS NOTES
[facility]                                                                                          Hospitalist Progress Note  Jackelyn Bullard MD  Office Phone: [phone]        Date of Service:  2024  NAME:  Karlee Leon  :  1950  MRN:  [id number]       Admission Summary:   Karlee Leon is a 74 y.o. female with past medical history of CVA with resultant gait disturbances and intermittent vertigo, chronic arthritic pain, diabetes, GERD, hypertension, ulcerative colitis who was brought to [facility] emergency department from long-term care facilities for concerns of increasing confusion.  Per chart review, she was seen increasingly confused at her long-term care facility over the last day.  She has reportedly also been refusing her medications has been alert to person only.  I have also been concerns creased somnolence.       Interval history / Subjective:   Patient seen examined at bedside earlier     Assessment & Plan:         AMS / Encephalopathy, improving   - CT head shows chronic microangiopathic changes  - Extensive workup in ED unremarkable   - Psych consulted, improving on zyprexa daily   - CRP elevated and increased on recheck, etiology unclear     - Plan for MRI brain ordered, now that patient is calmer may be able to obtain   - AMS ?2/2 UTI treated below   - Patient previously refusing meds and interventions, now calm and accepting medications   - Restraints dc'd 12/10     Large Fibroid Uterus   - Patient noted to have abdominal distention  - CT scan obtained demonstrated large fibroid uterurs; 20x14 cm   - Gyn Onc consulted, no acute intervention   - likely is likely causing acute urinary retention see below     Acute Urinary Retention  - Jasso placed in ED for acute urinary retention   - BALA significantly improved after bowel decompression   - keep jasso for now as uterine fibroids may be contributing to retention, fu op for void  and/or order of tests in the medicine section of [facility]      I have personally and independently reviewed all pertinent labs, diagnostic studies, imaging, and have provided independent interpretation of the same.     Labs:     Recent Labs     12/11/24  0758 12/12/24  0603   WBC 8.5 7.8   HGB 14.1 14.1   HCT 44.9 44.3    273     Recent Labs     12/10/24  1645 12/11/24  0758 12/12/24  0603   * 143 140   K 3.5 4.5 4.3   * 113* 115*   CO2 19* 22 23   BUN 17 19 19   MG 1.5* 1.9 2.0   PHOS  --  3.2 3.5     No results for input(s): \"ALT\", \"TP\", \"GLOB\", \"GGT\" in the last 72 hours.    Invalid input(s): \"SGOT\", \"GPT\", \"AP\", \"TBIL\", \"TBILI\", \"ALB\", \"AML\", \"AMYP\", \"LPSE\", \"HLPSE\"    No results for input(s): \"INR\", \"APTT\" in the last 72 hours.    Invalid input(s): \"PTP\"   No results for input(s): \"TIBC\" in the last 72 hours.    Invalid input(s): \"FE\", \"PSAT\", \"FERR\"   No results found for: \"RBCF\"   No results for input(s): \"PH\", \"PCO2\", \"PO2\" in the last 72 hours.  No results for input(s): \"CPK\" in the last 72 hours.    Invalid input(s): \"CPKMB\", \"CKNDX\", \"TROIQ\"  Lab Results   Component Value Date/Time    CHOL 194 03/01/2022 12:37 AM    HDL 53 03/01/2022 12:37 AM    .4 03/01/2022 12:37 AM     No results found for: \"GLUCPOC\"  [unfilled]    Notes reviewed from all clinical/nonclinical/nursing services involved in patient's clinical care. Care coordination discussions were held with appropriate clinical/nonclinical/ nursing providers based on care coordination needs.         Patients current active Medications were reviewed, considered, added and adjusted based on the clinical condition today.      Home Medications were reconciled to the best of my ability given all available resources at the time of admission. Route is PO if not otherwise noted.      Medications Reviewed:     Current Facility-Administered Medications   Medication Dose Route Frequency    [START ON 12/13/2024] enoxaparin (LOVENOX) injection 40

## 2024-12-13 ENCOUNTER — OFFICE VISIT (OUTPATIENT)
Facility: CLINIC | Age: 74
End: 2024-12-13

## 2024-12-13 VITALS
SYSTOLIC BLOOD PRESSURE: 121 MMHG | BODY MASS INDEX: 30.31 KG/M2 | WEIGHT: 171.08 LBS | TEMPERATURE: 98.9 F | HEIGHT: 63 IN | OXYGEN SATURATION: 92 % | RESPIRATION RATE: 16 BRPM | HEART RATE: 70 BPM | DIASTOLIC BLOOD PRESSURE: 71 MMHG

## 2024-12-13 DIAGNOSIS — N18.31 STAGE 3A CHRONIC KIDNEY DISEASE (HCC): ICD-10-CM

## 2024-12-13 DIAGNOSIS — E11.65 UNCONTROLLED TYPE 2 DIABETES MELLITUS WITH HYPERGLYCEMIA (HCC): ICD-10-CM

## 2024-12-13 DIAGNOSIS — I10 PRIMARY HYPERTENSION: ICD-10-CM

## 2024-12-13 DIAGNOSIS — F33.1 MODERATE EPISODE OF RECURRENT MAJOR DEPRESSIVE DISORDER (HCC): ICD-10-CM

## 2024-12-13 DIAGNOSIS — N30.00 ACUTE CYSTITIS WITHOUT HEMATURIA: ICD-10-CM

## 2024-12-13 DIAGNOSIS — K59.09 CHRONIC CONSTIPATION: ICD-10-CM

## 2024-12-13 DIAGNOSIS — R26.9 GAIT ABNORMALITY: ICD-10-CM

## 2024-12-13 DIAGNOSIS — D25.2 INTRAMURAL, SUBMUCOUS, AND SUBSEROUS LEIOMYOMA OF UTERUS: ICD-10-CM

## 2024-12-13 DIAGNOSIS — G93.40 ACUTE ENCEPHALOPATHY: Primary | ICD-10-CM

## 2024-12-13 DIAGNOSIS — R42 VERTIGO, INTERMITTENT: ICD-10-CM

## 2024-12-13 DIAGNOSIS — I50.32 CHRONIC HEART FAILURE WITH PRESERVED EJECTION FRACTION (HCC): ICD-10-CM

## 2024-12-13 DIAGNOSIS — D25.0 INTRAMURAL, SUBMUCOUS, AND SUBSEROUS LEIOMYOMA OF UTERUS: ICD-10-CM

## 2024-12-13 DIAGNOSIS — D25.1 INTRAMURAL, SUBMUCOUS, AND SUBSEROUS LEIOMYOMA OF UTERUS: ICD-10-CM

## 2024-12-13 DIAGNOSIS — I63.19 CEREBROVASCULAR ACCIDENT (CVA) DUE TO EMBOLISM OF OTHER PRECEREBRAL ARTERY (HCC): ICD-10-CM

## 2024-12-13 LAB
CANCER AG19-9 SERPL-ACNC: 19 U/ML (ref 0–35)
COMMENT:: NORMAL
GLUCOSE BLD STRIP.AUTO-MCNC: 129 MG/DL (ref 65–117)
GLUCOSE BLD STRIP.AUTO-MCNC: 199 MG/DL (ref 65–117)
GLUCOSE BLD STRIP.AUTO-MCNC: 288 MG/DL (ref 65–117)
MAGNESIUM SERPL-MCNC: 2.1 MG/DL (ref 1.6–2.4)
PHOSPHATE SERPL-MCNC: 3.5 MG/DL (ref 2.6–4.7)
SERVICE CMNT-IMP: ABNORMAL
SPECIMEN HOLD: NORMAL

## 2024-12-13 PROCEDURE — 2580000003 HC RX 258: Performed by: STUDENT IN AN ORGANIZED HEALTH CARE EDUCATION/TRAINING PROGRAM

## 2024-12-13 PROCEDURE — 83735 ASSAY OF MAGNESIUM: CPT

## 2024-12-13 PROCEDURE — 6360000002 HC RX W HCPCS: Performed by: STUDENT IN AN ORGANIZED HEALTH CARE EDUCATION/TRAINING PROGRAM

## 2024-12-13 PROCEDURE — 6370000000 HC RX 637 (ALT 250 FOR IP): Performed by: STUDENT IN AN ORGANIZED HEALTH CARE EDUCATION/TRAINING PROGRAM

## 2024-12-13 PROCEDURE — 82962 GLUCOSE BLOOD TEST: CPT

## 2024-12-13 PROCEDURE — 6370000000 HC RX 637 (ALT 250 FOR IP): Performed by: HOSPITALIST

## 2024-12-13 PROCEDURE — 6370000000 HC RX 637 (ALT 250 FOR IP): Performed by: NURSE PRACTITIONER

## 2024-12-13 PROCEDURE — 84100 ASSAY OF PHOSPHORUS: CPT

## 2024-12-13 RX ORDER — TRAZODONE HYDROCHLORIDE 50 MG/1
25 TABLET, FILM COATED ORAL EVERY 6 HOURS PRN
Qty: 6 TABLET | Refills: 0 | Status: SHIPPED
Start: 2024-12-13 | End: 2024-12-16

## 2024-12-13 RX ORDER — METOPROLOL SUCCINATE 25 MG/1
75 TABLET, EXTENDED RELEASE ORAL DAILY
Qty: 90 TABLET | Refills: 0 | Status: SHIPPED
Start: 2024-12-13 | End: 2025-01-12

## 2024-12-13 RX ORDER — OLANZAPINE 5 MG/1
2.5 TABLET, ORALLY DISINTEGRATING ORAL NIGHTLY
Qty: 2 TABLET | Refills: 0 | Status: SHIPPED
Start: 2024-12-13 | End: 2024-12-16

## 2024-12-13 RX ADMIN — INSULIN LISPRO 4 UNITS: 100 INJECTION, SOLUTION INTRAVENOUS; SUBCUTANEOUS at 11:47

## 2024-12-13 RX ADMIN — CEFTRIAXONE SODIUM 1000 MG: 1 INJECTION, POWDER, FOR SOLUTION INTRAMUSCULAR; INTRAVENOUS at 09:37

## 2024-12-13 RX ADMIN — HYDRALAZINE HYDROCHLORIDE 25 MG: 50 TABLET ORAL at 14:47

## 2024-12-13 RX ADMIN — FAMOTIDINE 20 MG: 20 TABLET, FILM COATED ORAL at 09:36

## 2024-12-13 RX ADMIN — METOPROLOL SUCCINATE 75 MG: 50 TABLET, EXTENDED RELEASE ORAL at 09:36

## 2024-12-13 RX ADMIN — OLANZAPINE 5 MG: 5 TABLET, ORALLY DISINTEGRATING ORAL at 12:41

## 2024-12-13 RX ADMIN — ESCITALOPRAM OXALATE 20 MG: 10 TABLET ORAL at 09:36

## 2024-12-13 RX ADMIN — CLONIDINE HYDROCHLORIDE 0.2 MG: 0.1 TABLET ORAL at 09:41

## 2024-12-13 RX ADMIN — INSULIN GLARGINE 15 UNITS: 100 INJECTION, SOLUTION SUBCUTANEOUS at 09:41

## 2024-12-13 RX ADMIN — ENOXAPARIN SODIUM 40 MG: 100 INJECTION SUBCUTANEOUS at 09:36

## 2024-12-13 RX ADMIN — HYDRALAZINE HYDROCHLORIDE 25 MG: 50 TABLET ORAL at 09:36

## 2024-12-13 RX ADMIN — SODIUM CHLORIDE, PRESERVATIVE FREE 10 ML: 5 INJECTION INTRAVENOUS at 09:46

## 2024-12-13 RX ADMIN — CLOPIDOGREL BISULFATE 75 MG: 75 TABLET ORAL at 09:36

## 2024-12-13 NOTE — PROGRESS NOTES
Senior Care Services   Skilled Nursing Facility Admission History and physical examination  [facility]     2024    Karlee Leon (:  1950) is a 74 y.o. female, was presented to emergency room on 2024 with a complaint of increased confusion with recurrent medical history of history of cerebrovascular accident gait abnormality intermittent vertigo chronic arthritis diabetic state gastroesophageal reflux disease hypertension ulcerative colitis, patient was discharged on 2024 to skilled nursing facility for continuation of her care with a discharge diagnosis of altered mental status changes encephalopathic and delirium, large fibroid uterus acute urinary tract infection with cystitis elevated CRP chronic heart failure with preserved ejection fraction NSTEMI, type II diabetic state insulin-dependent, cerebrovascular accident gait abnormality intermittent vertigo hypertension stage III chronic kidney disease major depressive disorder, Patient seen and examined today,in bed comfortably ,  vital signs are reviewed in a stable rang, baseline lab workup from the emergency room and hospital visit is at and is being in stable level,pain is nicely controlled, as previous note patient refused her medication currently only alert not oriented to time and space, while hospitalized neurology psychiatrist cardiologist diabetic team psychiatrist dietitian OB/GYN consulted notes and the recommendation reviewed, patient denies suicidal homicidal ideation and no illusion hallucination, has been compliant with the medication since discharge and trying to be as active as possible with physical therapy, patient has no skin tear, trying to have a low-salt diet, stating that she has been trying to eat better since the dischargeFoley catheter in place,  pt lives at resident of a long-term facility, at single with 1 kid, used to work as a teacher for many years,

## 2024-12-13 NOTE — CARE COORDINATION
Transition of Care Plan to SNF/Rehab    Communication to Patient/Family:  Met with patient and family and they are agreeable to the transition plan. The Plan for Transition of Care is related to the following treatment goals:rehab    The Patient and/or patient representative was provided with a choice of provider and agrees  with the discharge plan.      Yes [x] No []    A Freedom of choice list was provided with basic dialogue that supports the patient's individualized plan of care/goals and shares the quality data associated with the providers.       Yes [x] No []    SNF/Rehab Transition:  Patient has been accepted to [facility] SNF/Rehab and meets criteria for admission.   Date of Inpatient Status Order:   Patient will transported by BLS/[facility] and expected to leave at 4pm.    Communication to SNF/Rehab:  Bedside RN, Joyce, has been notified to update the transition plan to the facility and call report ([phone]).  Discharge information has been updated on the AVS. And communicated to facility via [employer], or CC link.             Nursing Please include all hard scripts for controlled substances, med rec and dc summary, and AVS in packet.     Reviewed and confirmed with facility, [facility], can manage the patient care needs for the following:     Leo with (X) only those applicable:  Medication:  [x]Medications are available at the facility  []IV Antibiotics    []Controlled Substance - hard copies available sent.  []Weekly Labs    Equipment:  []CPAP/BiPAP  []Wound Vacuum  []Wills or Urinary Device  []PICC/Central Line  []Nebulizer  []Ventilator    Treatment:  []Isolation (for MRSA, VRE, etc.)  []Surgical Drain Management  []Tracheostomy Care  []Dressing Changes  []Dialysis with transportation  []PEG Care  []Oxygen  []Daily Weights for Heart Failure    Dietary:  []Any diet limitations  []Tube Feedings   []Total Parenteral Management (TPN)    Financial Resources:  []Medicaid Application  Completed    []UAI Completed and copy given to pt/family  and copy given to pt/family  []A screening has previously been completed.    []Level II Completed    [x] Private pay individual who will not become   financially eligible for Medicaid within 6 months from admission to a [facility].     [] Individual refused to have screening conducted.     []Medicaid Application Completed    []The screening denied because it was determined individual did not need/did not qualify for nursing facility level of care.  [] Out of state residents seeking direct admission to a VA nursing facility.  [] Individuals who are inpatients of an out of state hospital, or in state or out of state veterans/ hospital and seek direct admission to a VA nursing facility  [] Individuals who are pateints or residents of a state owned/operated facility that is licensed by Department of Behavioral Services (DBHDS) and seek direct admission to VA nursing [facility]  [] A screening not required for enrollment in Medicaid Hospice services as set out in 12 VAC 30-  [] [facility] staff shall perform screenings of the [facility] clients.    Advanced Care Plan:  []Surrogate Decision Maker of Care  []POA  []Communicated Code Status and copy sent.    Other:   MONICA spoke with Geovanni with [facility] to confirm that [facility] can accept this pt today. MONICA also spoke with pt's son, Ron, to inform him and he is in agreement with d/c today. Transport has been set up with [facility] for 4pm today. An envelope containing pt's MAR and AVS will be sent with pt to the facility and pt's nurse will call report. ZOHRA Tamayo,ACM-SW

## 2024-12-13 NOTE — DISCHARGE SUMMARY
Discharge Summary       PATIENT ID: Karlee Leon  MRN: [id number]   YOB: 1950    DATE OF ADMISSION: 12/7/2024  7:30 PM    DATE OF DISCHARGE: 12/13/24    PRIMARY CARE PROVIDER: Terrence Trejo MD     ATTENDING PHYSICIAN: Jackelyn Bullard MD   DISCHARGING PROVIDER: Jackelyn Bullard MD    To contact this individual call [phone] and ask the  to page.  If unavailable ask to be transferred the Adult Hospitalist Department.    CONSULTATIONS: IP CONSULT TO NEUROLOGY  IP CONSULT TO PSYCHIATRY  IP CONSULT TO CARDIOLOGY  IP CONSULT TO DIABETES MANAGEMENT  IP CONSULT TO PSYCHIATRY  IP CONSULT TO DIETITIAN  IP CONSULT TO GYNECOLOGIC ONCOLOGY    PROCEDURES/SURGERIES: * No surgery found *     ADMITTING DIAGNOSES & HOSPITAL COURSE:   HPI: \"Karlee Leon is a 74 y.o. female with past medical history of CVA with resultant gait disturbances and intermittent vertigo, chronic arthritic pain, diabetes, GERD, hypertension, ulcerative colitis who was brought to [facility] emergency department from long-term care facilities for concerns of increasing confusion.  Per chart review, she was seen increasingly confused at her long-term care facility over the last day.  She has reportedly also been refusing her medications has been alert to person only.  I have also been concerns creased somnolence.\"        DISCHARGE DIAGNOSES / PLAN:      AMS / Encephalopathy, improving /delerium   - CT head shows chronic microangiopathic changes  - Extensive workup in ED unremarkable   - Psych consulted, improving on zyprexa daily   - CRP elevated and increased on recheck, etiology unclear     -MRI brain just noticed chronic infarct,already on plavix statin,   - AMS ?2/2 UTI treated below   - Patient previously refusing meds and interventions, now calm and accepting medications , appreciate psychiatry recommendations  - Restraints dc'd 12/10      Large Fibroid Uterus   - Patient noted to have abdominal     Follow-up Information       Follow up With Specialties Details Why Contact Info    Terrence Trejo MD Family Medicine Schedule an appointment as soon as possible for a visit Please schedule a follow-up appointment with your Primary Care Provider within 2-3 weeks after discharge. [address]  [address]  [facility] [address] [phone]      [facility] ([facility]) Skilled Nursing Facility   [address] [address] [phone]    Tomer Bobby Jr., MD Gynecological Oncology Call in 1 day(s)  [address]  [address]  [facility] [address] [phone]      [facility]  Call in 1 day(s) call for void trial [facility]  [address]   [facility] [address] [phone]              ADDITIONAL CARE RECOMMENDATIONS: Repeat CBC, BMP 3 to 5 days    DIET: regular diet    ACTIVITY: activity as tolerated      DISCHARGE MEDICATIONS:     Medication List        START taking these medications      OLANZapine zydis 5 MG disintegrating tablet  Commonly known as: ZYPREXA  Take 0.5 tablets by mouth nightly for 3 days     traZODone 50 MG tablet  Commonly known as: DESYREL  Take 0.5 tablets by mouth every 6 hours as needed for Sleep (For anxiety and agitation or if wakes up in the middle of the night unable to go back to sleep)            CHANGE how you take these medications      metoprolol succinate 25 MG extended release tablet  Commonly known as: TOPROL XL  Take 3 tablets by mouth daily  What changed:   medication strength  how much to take            CONTINUE taking these medications      acetaminophen 325 MG tablet  Commonly known as: TYLENOL     atorvastatin 80 MG tablet  Commonly known as: LIPITOR     cloNIDine 0.1 MG tablet  Commonly known as: CATAPRES     clopidogrel 75 MG tablet  Commonly known as: PLAVIX     escitalopram 5 MG tablet  Commonly known as: LEXAPRO     famotidine 20 MG tablet  Commonly known as: PEPCID     heparin (porcine) 5000 UNIT/ML injection

## 2024-12-13 NOTE — CONSULTS
[facility]  PSYCHIATRY CONSULT NOTE:    Name: Karlee Leon  MR#: [id number]  : 1950  ACCOUNT#: [id number]  ADMIT DATE: 2024    REASON FOR CONSULT: AMS    Interval update    2024-Ms. Leno was seen today for follow-up and evaluate at that deepness of daily olanzapine at 2.5 mg.  Ms. Leon continues to significantly improve in her mentation and attentiveness during my encounter.  Per her nurse she was also starting to read some and looking at a book today.  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  She does also have some residual confusion but is much more appropriate and easily redirectable.  She has not been screaming out or inappropriate today from report.    2024-patient seen for a follow-up while resting in recliner next to her bed.  RN was alongside with tech in order to get her blood sugar checked.  Patient was somnolent initially but was easily aroused and once she was more awake began to ask \"why am I still here why do I have to be here\".  She did answer some questions appropriately but was increasing in lucidity as she woke up.  Otherwise she continues to be confused about \"I dont want that!\",  When her tech and nurse    12/10/2024-patient seen for a follow-up while resting in bed and was easily arousable by calling her name.  She no longer is needing restrains and appears to be relaxed and comfortable.  She engages in dialogue and responds appropriately after a small delay sometimes.  She asked me to help her screwed up in bed.  She remained pleasant and calm throughout my stay and appropriate.  She denied feeling hungry or having any pain or any other symptoms that may be a source of agitation at this time.    Patient was given olanzapine today 2.5 IM around 1230 and nursing reports that she had done well with this but remained groggy for a couple hours after prior to administration  again.    HISTORY OF PRESENTING COMPLAINT:  Karlee Leon is a 74 y.o. female [facility]  She is experiencing delirium with workup for underlying etiology likely secondary to metabolic multifactorial etiology.  Psychiatry was asked to evaluate and help manage for any possible psychiatric symptoms or underlying psychiatric acute symptoms.  She was seen and her nurse was at bedside with her reporting that she had \"just settled down for the first time\".  She did not easily aroused when lightly calling her name but her nurse reports that she was given a dose of Ativan and since administration of this delayed within about an hour and a half she started to call down and be more appropriate in her disposition and behavior.  Since that time she was also given a dose of olanzapine, unsure how she responded? And a dose of Haldol IM in efforts to help her relax prior to MRI.  Nursing had reported \"it did not touch her\".    She currently lives at a nursing facility and was sent for this increased confusion.  She does have a history of major depression and takes Lexapro    PAST PSYCHIATRIC HISTORY: She takes Lexapro for depression    SUBSTANCE ABUSE HISTORY: Unknown    PSYCHOSOCIAL HISTORY: Living in local area at nursing facility    MENTAL STATUS EXAM:   Karlee Leon is a 74 y.o. White (non-) female who is resting in bed and groggy with her eyes closed no distress at this time.  Unable to mental status exam at this time.    DIAGNOSTIC IMPRESSION: Delirium secondary to underlying metabolic etiology associated with periods of psychosis    ASSESSMENT/PLAN:   Date 12/12/2024  Continue olanzapine 2.5 mg daily no changes made today.    Psychiatry will follow peripherally as patient is stabilizing and clearing with mentation and attention improvement daily.  Please do not hesitate to reconsult for worsening symptoms or if our services may be of assistance in

## 2024-12-13 NOTE — ASSESSMENT & PLAN NOTE
Chronic, at goal (stable), continue current treatment plan and medication adherence emphasized  Continue with blood pressure check low-salt diet continue with metoprolol succinate 25 mg extended release 3 tablet once daily, we will continue with clonidine 0.1 mg 1 tablet daily

## 2024-12-13 NOTE — ASSESSMENT & PLAN NOTE
Chronic, not at goal (unstable), continue current treatment plan, medication adherence emphasized, and lifestyle modifications recommended  Fall precaution follow-up with neurology as an outpatient, continue with Plavix 75 mg 1 tablet daily continue with atorvastatin 80 mg 1 tablet nightly

## 2024-12-13 NOTE — ASSESSMENT & PLAN NOTE
New, at goal (stable), continue current treatment plan and medication adherence emphasized  Was with delirium and altered mental status changes has improved since the discharge with chronic infarct infection and patient medication refusal could be a culprit, will continue to monitor with increase oral hydration medication compliancy readvised

## 2024-12-16 ENCOUNTER — OFFICE VISIT (OUTPATIENT)
Facility: CLINIC | Age: 74
End: 2024-12-16
Payer: MEDICARE

## 2024-12-16 DIAGNOSIS — G93.40 ACUTE ENCEPHALOPATHY: Primary | ICD-10-CM

## 2024-12-16 DIAGNOSIS — I50.32 CHRONIC HEART FAILURE WITH PRESERVED EJECTION FRACTION (HCC): ICD-10-CM

## 2024-12-16 DIAGNOSIS — F33.1 MODERATE EPISODE OF RECURRENT MAJOR DEPRESSIVE DISORDER (HCC): ICD-10-CM

## 2024-12-16 DIAGNOSIS — K59.09 CHRONIC CONSTIPATION: ICD-10-CM

## 2024-12-16 DIAGNOSIS — E11.65 UNCONTROLLED TYPE 2 DIABETES MELLITUS WITH HYPERGLYCEMIA (HCC): ICD-10-CM

## 2024-12-16 DIAGNOSIS — I63.19 CEREBROVASCULAR ACCIDENT (CVA) DUE TO EMBOLISM OF OTHER PRECEREBRAL ARTERY (HCC): ICD-10-CM

## 2024-12-16 DIAGNOSIS — D25.0 INTRAMURAL, SUBMUCOUS, AND SUBSEROUS LEIOMYOMA OF UTERUS: ICD-10-CM

## 2024-12-16 DIAGNOSIS — N30.00 ACUTE CYSTITIS WITHOUT HEMATURIA: ICD-10-CM

## 2024-12-16 DIAGNOSIS — R26.9 GAIT ABNORMALITY: ICD-10-CM

## 2024-12-16 DIAGNOSIS — D25.2 INTRAMURAL, SUBMUCOUS, AND SUBSEROUS LEIOMYOMA OF UTERUS: ICD-10-CM

## 2024-12-16 DIAGNOSIS — D25.1 INTRAMURAL, SUBMUCOUS, AND SUBSEROUS LEIOMYOMA OF UTERUS: ICD-10-CM

## 2024-12-16 DIAGNOSIS — N18.31 STAGE 3A CHRONIC KIDNEY DISEASE (HCC): ICD-10-CM

## 2024-12-16 DIAGNOSIS — R42 VERTIGO, INTERMITTENT: ICD-10-CM

## 2024-12-16 DIAGNOSIS — I10 PRIMARY HYPERTENSION: ICD-10-CM

## 2024-12-16 PROCEDURE — 3051F HG A1C>EQUAL 7.0%<8.0%: CPT

## 2024-12-16 PROCEDURE — G8484 FLU IMMUNIZE NO ADMIN: HCPCS

## 2024-12-16 PROCEDURE — 99310 SBSQ NF CARE HIGH MDM 45: CPT

## 2024-12-16 PROCEDURE — 1123F ACP DISCUSS/DSCN MKR DOCD: CPT

## 2024-12-16 NOTE — PROGRESS NOTES
PLACE OF SERVICE:  [place] [address] [place] [address]    SKILLED VISIT      Chief Complaint:  skilled visit, labs ordered ,hospital chart reviewed.     HPI  Patient is a 74-year-old female with past medical history of CVA with resultant gait disturbances and intermittent vertigo arthritis diabetes GERD hypertension ulcerative colitis presented to the emergency room from her long-term care facility with chief complaints of increased confusion/altered mental status.  For long-term care facility over the last few days she was increasingly confused , refusing her medications and had been alert only to self with increased concerns of somnolence.  CT of the head showed chronic microangiopathic changes extensive workup in the ED was unremarkable psych consulted started on Zyprexa improving MRI of the brain noticed chronic infarcts patient is on Plavix and statin Wills placed in the ED for urinary retention CT scan showed fibroids GYN consulted no acute intervention in the emergency kidney functions improved after bowel decompression in the emergency room status post Wills placement.  Urinalysis positive for UTI treated with Rocephin will keep Wills in per discharge instructions and will follow-up with voiding trial in the facility.  CRP elevated in the hospital etiology unclear will continue to monitor patient chronic heart failure with ejection fraction 65 to 70% continue medical management with Plavix Lipitor and metoprolol hold diabetes type 2 on insulin patient has history of CVA with resultant gait disturbances and intermittent vertigo she is wheelchair-bound lives at long-term care facility and is already at [facility] for blood pressure she is on clonidine metoprolol hydralazine she has CKD stage II will follow-up with labs in the facility for depression moods remain stable on Lexapro started on olanzapine in the hospital and she is on trazodone.  Patient has follow-up

## 2024-12-18 ENCOUNTER — OFFICE VISIT (OUTPATIENT)
Facility: CLINIC | Age: 74
End: 2024-12-18

## 2024-12-18 DIAGNOSIS — I63.19 CEREBROVASCULAR ACCIDENT (CVA) DUE TO EMBOLISM OF OTHER PRECEREBRAL ARTERY (HCC): ICD-10-CM

## 2024-12-18 DIAGNOSIS — N30.00 ACUTE CYSTITIS WITHOUT HEMATURIA: ICD-10-CM

## 2024-12-18 DIAGNOSIS — D25.1 INTRAMURAL, SUBMUCOUS, AND SUBSEROUS LEIOMYOMA OF UTERUS: ICD-10-CM

## 2024-12-18 DIAGNOSIS — Z97.8 FOLEY CATHETER STATUS: ICD-10-CM

## 2024-12-18 DIAGNOSIS — E11.65 UNCONTROLLED TYPE 2 DIABETES MELLITUS WITH HYPERGLYCEMIA (HCC): ICD-10-CM

## 2024-12-18 DIAGNOSIS — D25.2 INTRAMURAL, SUBMUCOUS, AND SUBSEROUS LEIOMYOMA OF UTERUS: ICD-10-CM

## 2024-12-18 DIAGNOSIS — F33.1 MODERATE EPISODE OF RECURRENT MAJOR DEPRESSIVE DISORDER (HCC): ICD-10-CM

## 2024-12-18 DIAGNOSIS — R42 VERTIGO, INTERMITTENT: ICD-10-CM

## 2024-12-18 DIAGNOSIS — N18.31 STAGE 3A CHRONIC KIDNEY DISEASE (HCC): ICD-10-CM

## 2024-12-18 DIAGNOSIS — D25.0 INTRAMURAL, SUBMUCOUS, AND SUBSEROUS LEIOMYOMA OF UTERUS: ICD-10-CM

## 2024-12-18 DIAGNOSIS — I50.32 CHRONIC HEART FAILURE WITH PRESERVED EJECTION FRACTION (HCC): ICD-10-CM

## 2024-12-18 DIAGNOSIS — R26.9 GAIT ABNORMALITY: ICD-10-CM

## 2024-12-18 DIAGNOSIS — I10 PRIMARY HYPERTENSION: ICD-10-CM

## 2024-12-18 DIAGNOSIS — G93.40 ACUTE ENCEPHALOPATHY: Primary | ICD-10-CM

## 2024-12-18 DIAGNOSIS — K59.09 CHRONIC CONSTIPATION: ICD-10-CM

## 2024-12-18 NOTE — PROGRESS NOTES
present no GI complains of nausea vomiting reported tolerating her meals well   Continue dysphagia/refgular  diet  Continue oral hydration continue with polyethylene glycol 17 g once daily Senokot as 8.6-50 mg 1 tablet daily  High risk for falls  Falls precautions in place no falls reported continue PT OT    Wills catheter status  Earlier this week orders to DC Wills follow-up for voiding trial ordered, patient's Wills has been discontinued this morning and staff will continue to BladderScan patient postvoid and if more than 400 cc postvoid residual.  Will reinsert Wills notify MD NP and follow-up with urology    KARL Edmonds NP

## 2024-12-25 ENCOUNTER — OFFICE VISIT (OUTPATIENT)
Facility: CLINIC | Age: 74
End: 2024-12-25

## 2024-12-25 DIAGNOSIS — N18.31 STAGE 3A CHRONIC KIDNEY DISEASE (HCC): ICD-10-CM

## 2024-12-25 DIAGNOSIS — E11.65 UNCONTROLLED TYPE 2 DIABETES MELLITUS WITH HYPERGLYCEMIA (HCC): ICD-10-CM

## 2024-12-25 DIAGNOSIS — I10 PRIMARY HYPERTENSION: ICD-10-CM

## 2024-12-25 DIAGNOSIS — F33.1 MODERATE EPISODE OF RECURRENT MAJOR DEPRESSIVE DISORDER (HCC): ICD-10-CM

## 2024-12-25 DIAGNOSIS — I50.32 CHRONIC HEART FAILURE WITH PRESERVED EJECTION FRACTION (HCC): ICD-10-CM

## 2024-12-25 DIAGNOSIS — I63.19 CEREBROVASCULAR ACCIDENT (CVA) DUE TO EMBOLISM OF OTHER PRECEREBRAL ARTERY (HCC): Primary | ICD-10-CM

## 2024-12-25 DIAGNOSIS — R26.9 GAIT ABNORMALITY: ICD-10-CM

## 2024-12-25 DIAGNOSIS — N30.00 ACUTE CYSTITIS WITHOUT HEMATURIA: ICD-10-CM

## 2024-12-25 DIAGNOSIS — G93.40 ACUTE ENCEPHALOPATHY: ICD-10-CM

## 2024-12-28 NOTE — PROGRESS NOTES
New, at goal (stable), continue current treatment plan and medication adherence emphasized  Continue with fall precaution and physical therapy range of motion strengthening        Acute cystitis without hematuria   Chronic, at goal (stable), continue current treatment plan and medication adherence emphasized  Repeat UA in 5 to 7 days continue with oral hydration due to large uterine fibroid repeated urine analysis with culture      continue with SNF continuity of care, bed rotation to prevent pressure ulcer, Covid-19 precautions with hygiene, nursing care, continue with ambulation out of bed to the chair and to the bathroom,  call for any concern, advance care planning is in place, and goal of care is to continue skilled nursing facility and transition to home       Return if symptoms worsen or fail to improve.           --Evaristo Shelton MD

## 2024-12-28 NOTE — ASSESSMENT & PLAN NOTE
Chronic, not at goal (unstable), continue current treatment plan and medication adherence emphasized  increase oral hydration medication compliancy readvised , follow-up with neurology as an outpatient, continue with Plavix 75 mg 1 tablet daily continue with atorvastatin 80 mg 1 tablet nightly

## 2024-12-28 NOTE — ASSESSMENT & PLAN NOTE
Chronic, at goal (stable), continue current treatment plan and medication adherence emphasized  low-salt diet continue with metoprolol succinate 25 mg extended release 3 tablet once daily, we will continue with clonidine 0.1 mg 1 tablet daily

## 2024-12-28 NOTE — ASSESSMENT & PLAN NOTE
New, at goal (stable), continue current treatment plan and medication adherence emphasized  Continue with fall precaution and physical therapy range of motion strengthening

## 2024-12-31 ENCOUNTER — OFFICE VISIT (OUTPATIENT)
Facility: CLINIC | Age: 74
End: 2024-12-31

## 2024-12-31 DIAGNOSIS — R42 VERTIGO, INTERMITTENT: ICD-10-CM

## 2024-12-31 DIAGNOSIS — F33.1 MODERATE EPISODE OF RECURRENT MAJOR DEPRESSIVE DISORDER (HCC): ICD-10-CM

## 2024-12-31 DIAGNOSIS — I50.32 CHRONIC HEART FAILURE WITH PRESERVED EJECTION FRACTION (HCC): ICD-10-CM

## 2024-12-31 DIAGNOSIS — I63.19 CEREBROVASCULAR ACCIDENT (CVA) DUE TO EMBOLISM OF OTHER PRECEREBRAL ARTERY (HCC): Primary | ICD-10-CM

## 2024-12-31 DIAGNOSIS — Z97.8 FOLEY CATHETER STATUS: ICD-10-CM

## 2024-12-31 DIAGNOSIS — G93.40 ACUTE ENCEPHALOPATHY: ICD-10-CM

## 2024-12-31 DIAGNOSIS — I10 PRIMARY HYPERTENSION: ICD-10-CM

## 2024-12-31 DIAGNOSIS — N18.31 STAGE 3A CHRONIC KIDNEY DISEASE (HCC): ICD-10-CM

## 2024-12-31 DIAGNOSIS — K59.09 CHRONIC CONSTIPATION: ICD-10-CM

## 2024-12-31 DIAGNOSIS — R26.9 GAIT ABNORMALITY: ICD-10-CM

## 2024-12-31 DIAGNOSIS — E11.65 UNCONTROLLED TYPE 2 DIABETES MELLITUS WITH HYPERGLYCEMIA (HCC): ICD-10-CM

## 2024-12-31 NOTE — PROGRESS NOTES
PLACE OF SERVICE:  [facility] [address] [place] [address]    SKILLED VISIT      Chief Complaint:  skilled visit, insulin adjustment.     HPI  Patient is a 74-year-old female with past medical history of CVA with resultant gait disturbances and intermittent vertigo arthritis diabetes GERD hypertension ulcerative colitis presented to the emergency room from her long-term care facility with chief complaints of increased confusion/altered mental status.  For long-term care facility over the last few days she was increasingly confused , refusing her medications and had been alert only to self with increased concerns of somnolence.  CT of the head showed chronic microangiopathic changes extensive workup in the ED was unremarkable psych consulted started on Zyprexa improving MRI of the brain noticed chronic infarcts patient is on Plavix and statin Wills placed in the ED for urinary retention CT scan showed fibroids GYN consulted no acute intervention in the emergency kidney functions improved after bowel decompression in the emergency room status post Wills placement.  Urinalysis positive for UTI treated with Rocephin will keep Wills in per discharge instructions and will follow-up with voiding trial in the facility.  CRP elevated in the hospital etiology unclear will continue to monitor patient chronic heart failure with ejection fraction 65 to 70% continue medical management with Plavix Lipitor and metoprolol hold diabetes type 2 on insulin patient has history of CVA with resultant gait disturbances and intermittent vertigo she is wheelchair-bound lives at long-term care facility and is already at maximum care for blood pressure she is on clonidine metoprolol hydralazine she has CKD stage II will follow-up with labs in the facility for depression moods remain stable on Lexapro started on olanzapine in the hospital and she is on trazodone.  Patient has follow-up appointment with GYN and

## 2025-01-03 ENCOUNTER — OFFICE VISIT (OUTPATIENT)
Facility: CLINIC | Age: 75
End: 2025-01-03

## 2025-01-03 DIAGNOSIS — R26.9 GAIT ABNORMALITY: ICD-10-CM

## 2025-01-03 DIAGNOSIS — I10 PRIMARY HYPERTENSION: ICD-10-CM

## 2025-01-03 DIAGNOSIS — G93.40 ACUTE ENCEPHALOPATHY: ICD-10-CM

## 2025-01-03 DIAGNOSIS — I50.32 CHRONIC HEART FAILURE WITH PRESERVED EJECTION FRACTION (HCC): ICD-10-CM

## 2025-01-03 DIAGNOSIS — E11.65 UNCONTROLLED TYPE 2 DIABETES MELLITUS WITH HYPERGLYCEMIA (HCC): ICD-10-CM

## 2025-01-03 DIAGNOSIS — N18.31 STAGE 3A CHRONIC KIDNEY DISEASE (HCC): ICD-10-CM

## 2025-01-03 DIAGNOSIS — I63.19 CEREBROVASCULAR ACCIDENT (CVA) DUE TO EMBOLISM OF OTHER PRECEREBRAL ARTERY (HCC): Primary | ICD-10-CM

## 2025-01-03 DIAGNOSIS — F33.1 MODERATE EPISODE OF RECURRENT MAJOR DEPRESSIVE DISORDER (HCC): ICD-10-CM

## 2025-01-03 NOTE — PROGRESS NOTES
olanzapine in the hospital and she is on trazodone.  Patient has follow-up appointment with GYN and [facility] for voiding trial.  After stabilizing in the hospital patient presents to SNF for continued PT OT and skilled nursing.       Discharge time : > 30 mins    Brief SNF Course: Patient's stay at the facility was unremarkable.  Wills discontinued in the facility she passed voiding trial voiding well no urinary symptoms reported remained afebrile no fever no temperature. she continued to work well with therapy mood remains stable no falls reported vital signs in the facility remained stable.  Blood pressure trends reviewed remained stable on hydralazine clonidine metoprolol no headache no dizziness no lightheadedness no vision changes reported for cholesterol she was on atorvastatin for diabetes she was not Humalog sliding scale and Lantus no signs and symptoms or episodes of hypoglycemia noted reported.  Provider signed paperwork for the receiving long term care  facility.No other acute events reported no falls reported she remained clinically stable at the facility.         Code Status:  full code     Exam:  Constitutional: No acute distress;   Eyes: Sclera clear, PERRLA;   Ears/nose/mouth/throat:mmm, OP clear, trachea midline;  Cardiovascular: RRR,nml S1 and S2, no rubs murmurs or gallops, no edema, no cyanosis;   Respiratory: Clear to auscultation, symmetric, no respiratory distress;  Gastrointestinal: Abdomen soft, NT, ND, no masses, normal bowel sounds;  Neurologic: Cranial nerves II through VII grossly intact, no speech or motor deficits A&O in time place and person  Skin: No rash, warm and dry;  Musculoskeletal: No erythema swelling or joint tenderness, extremities without cyanosis, neck supple, ROM intact spine and extremities;  Psychiatric: Not agitated.  Appropriate affect, mood, judgment and insight.  Genitourinary: No suprapubic tenderness or flank tenderness  Heme, lymph, immuno: No pallor;

## 2025-01-05 ENCOUNTER — APPOINTMENT (OUTPATIENT)
Facility: HOSPITAL | Age: 75
End: 2025-01-05
Payer: MEDICARE

## 2025-01-05 LAB
ALBUMIN SERPL-MCNC: 3.7 G/DL (ref 3.5–5)
ALBUMIN/GLOB SERPL: 1 (ref 1.1–2.2)
ALP SERPL-CCNC: 129 U/L (ref 45–117)
ALT SERPL-CCNC: 50 U/L (ref 12–78)
ANION GAP SERPL CALC-SCNC: 12 MMOL/L (ref 2–12)
APPEARANCE UR: CLEAR
AST SERPL-CCNC: 24 U/L (ref 15–37)
BACTERIA URNS QL MICRO: NEGATIVE /HPF
BASOPHILS # BLD: 0.1 K/UL (ref 0–0.1)
BASOPHILS NFR BLD: 1 % (ref 0–1)
BILIRUB SERPL-MCNC: 0.5 MG/DL (ref 0.2–1)
BILIRUB UR QL: NEGATIVE
BUN SERPL-MCNC: 23 MG/DL (ref 6–20)
BUN/CREAT SERPL: 13 (ref 12–20)
CALCIUM SERPL-MCNC: 10 MG/DL (ref 8.5–10.1)
CHLORIDE SERPL-SCNC: 104 MMOL/L (ref 97–108)
CO2 SERPL-SCNC: 29 MMOL/L (ref 21–32)
COLOR UR: ABNORMAL
COMMENT:: NORMAL
CREAT SERPL-MCNC: 1.81 MG/DL (ref 0.55–1.02)
DIFFERENTIAL METHOD BLD: ABNORMAL
EOSINOPHIL # BLD: 0 K/UL (ref 0–0.4)
EOSINOPHIL NFR BLD: 0 % (ref 0–7)
EPITH CASTS URNS QL MICRO: ABNORMAL /LPF
ERYTHROCYTE [DISTWIDTH] IN BLOOD BY AUTOMATED COUNT: 14.8 % (ref 11.5–14.5)
GLOBULIN SER CALC-MCNC: 3.8 G/DL (ref 2–4)
GLUCOSE BLD STRIP.AUTO-MCNC: 129 MG/DL (ref 65–117)
GLUCOSE BLD STRIP.AUTO-MCNC: 179 MG/DL (ref 65–117)
GLUCOSE BLD STRIP.AUTO-MCNC: 220 MG/DL (ref 65–117)
GLUCOSE BLD STRIP.AUTO-MCNC: 255 MG/DL (ref 65–117)
GLUCOSE BLD STRIP.AUTO-MCNC: 67 MG/DL (ref 65–117)
GLUCOSE BLD STRIP.AUTO-MCNC: 69 MG/DL (ref 65–117)
GLUCOSE BLD STRIP.AUTO-MCNC: 80 MG/DL (ref 65–117)
GLUCOSE BLD STRIP.AUTO-MCNC: 90 MG/DL (ref 65–117)
GLUCOSE SERPL-MCNC: 56 MG/DL (ref 65–100)
GLUCOSE UR STRIP.AUTO-MCNC: NEGATIVE MG/DL
HCT VFR BLD AUTO: 47.3 % (ref 35–47)
HGB BLD-MCNC: 15.7 G/DL (ref 11.5–16)
HGB UR QL STRIP: NEGATIVE
IMM GRANULOCYTES # BLD AUTO: 0.1 K/UL (ref 0–0.04)
IMM GRANULOCYTES NFR BLD AUTO: 1 % (ref 0–0.5)
KETONES UR QL STRIP.AUTO: NEGATIVE MG/DL
LEUKOCYTE ESTERASE UR QL STRIP.AUTO: NEGATIVE
LIPASE SERPL-CCNC: 47 U/L (ref 13–75)
LYMPHOCYTES # BLD: 3.8 K/UL (ref 0.8–3.5)
LYMPHOCYTES NFR BLD: 28 % (ref 12–49)
MAGNESIUM SERPL-MCNC: 1.7 MG/DL (ref 1.6–2.4)
MCH RBC QN AUTO: 30.1 PG (ref 26–34)
MCHC RBC AUTO-ENTMCNC: 33.2 G/DL (ref 30–36.5)
MCV RBC AUTO: 90.6 FL (ref 80–99)
MONOCYTES # BLD: 1.8 K/UL (ref 0–1)
MONOCYTES NFR BLD: 13 % (ref 5–13)
NEUTS SEG # BLD: 8 K/UL (ref 1.8–8)
NEUTS SEG NFR BLD: 57 % (ref 32–75)
NITRITE UR QL STRIP.AUTO: NEGATIVE
NRBC # BLD: 0 K/UL (ref 0–0.01)
NRBC BLD-RTO: 0 PER 100 WBC
PH UR STRIP: 5.5 (ref 5–8)
PLATELET # BLD AUTO: 398 K/UL (ref 150–400)
PMV BLD AUTO: 10.1 FL (ref 8.9–12.9)
POTASSIUM SERPL-SCNC: 3.3 MMOL/L (ref 3.5–5.1)
PROT SERPL-MCNC: 7.5 G/DL (ref 6.4–8.2)
PROT UR STRIP-MCNC: 100 MG/DL
RBC # BLD AUTO: 5.22 M/UL (ref 3.8–5.2)
RBC #/AREA URNS HPF: ABNORMAL /HPF (ref 0–5)
SERVICE CMNT-IMP: ABNORMAL
SERVICE CMNT-IMP: NORMAL
SODIUM SERPL-SCNC: 145 MMOL/L (ref 136–145)
SP GR UR REFRACTOMETRY: 1.03 (ref 1–1.03)
SPECIMEN HOLD: NORMAL
UROBILINOGEN UR QL STRIP.AUTO: 0.2 EU/DL (ref 0.2–1)
WBC # BLD AUTO: 13.9 K/UL (ref 3.6–11)
WBC URNS QL MICRO: ABNORMAL /HPF (ref 0–4)

## 2025-01-05 PROCEDURE — 83690 ASSAY OF LIPASE: CPT

## 2025-01-05 PROCEDURE — 96360 HYDRATION IV INFUSION INIT: CPT

## 2025-01-05 PROCEDURE — 6370000000 HC RX 637 (ALT 250 FOR IP): Performed by: STUDENT IN AN ORGANIZED HEALTH CARE EDUCATION/TRAINING PROGRAM

## 2025-01-05 PROCEDURE — 85025 COMPLETE CBC W/AUTO DIFF WBC: CPT

## 2025-01-05 PROCEDURE — 81001 URINALYSIS AUTO W/SCOPE: CPT

## 2025-01-05 PROCEDURE — 99284 EMERGENCY DEPT VISIT MOD MDM: CPT

## 2025-01-05 PROCEDURE — 83735 ASSAY OF MAGNESIUM: CPT

## 2025-01-05 PROCEDURE — 36415 COLL VENOUS BLD VENIPUNCTURE: CPT

## 2025-01-05 PROCEDURE — 71046 X-RAY EXAM CHEST 2 VIEWS: CPT

## 2025-01-05 PROCEDURE — 80053 COMPREHEN METABOLIC PANEL: CPT

## 2025-01-05 PROCEDURE — 82962 GLUCOSE BLOOD TEST: CPT

## 2025-01-05 PROCEDURE — 2580000003 HC RX 258: Performed by: STUDENT IN AN ORGANIZED HEALTH CARE EDUCATION/TRAINING PROGRAM

## 2025-01-05 RX ORDER — 0.9 % SODIUM CHLORIDE 0.9 %
500 INTRAVENOUS SOLUTION INTRAVENOUS ONCE
Status: COMPLETED | OUTPATIENT
Start: 2025-01-05 | End: 2025-01-05

## 2025-01-05 RX ORDER — TRAZODONE HYDROCHLORIDE 50 MG/1
25 TABLET, FILM COATED ORAL NIGHTLY
Status: DISCONTINUED | OUTPATIENT
Start: 2025-01-05 | End: 2025-01-05

## 2025-01-05 RX ORDER — MIRTAZAPINE 15 MG/1
15 TABLET, FILM COATED ORAL NIGHTLY
Status: DISCONTINUED | OUTPATIENT
Start: 2025-01-05 | End: 2025-01-06 | Stop reason: HOSPADM

## 2025-01-05 RX ADMIN — MIRTAZAPINE 15 MG: 15 TABLET, FILM COATED ORAL at 22:37

## 2025-01-05 RX ADMIN — SODIUM CHLORIDE 500 ML: 9 INJECTION, SOLUTION INTRAVENOUS at 18:50

## 2025-01-05 NOTE — ED TRIAGE NOTES
Pt arrives EMS with c/o hypoglycemia. Pt is a [place] pt who staff check sugar and found her to be 50, attempted to give glucose with a further decrease of sugar per EMS. EMG BG 68 on arrival, admin 100cc D10 with improvement to 90 per EMS.    ED BG here is 69. MD Brooks verbal to feed patient.

## 2025-01-05 NOTE — ED NOTES
Per , the pt was discharged on Friday back to [facility] with assisted level of care wherein she gets assistance dressing, medications and four checks per day during the day. Pt reports that she cannot eat by herself, pt  Bill agrees that the pt is unable to keep up with meals by herself. Pt  reports that she is currently in the \"highest level independent living service at facility\" and that he would like to work with case management to get pt into skilled nursing that will better address her level of care/ADL needs.

## 2025-01-05 NOTE — ED PROVIDER NOTES
[facility]  EMERGENCY DEPARTMENT ENCOUNTER      Pt Name: Karlee Leon  MRN: [id number]  Birthdate 1950  Date of evaluation: 1/5/2025  Provider: Dickson Brooks DO    CHIEF COMPLAINT       Chief Complaint   Patient presents with    Hypoglycemia         HISTORY OF PRESENT ILLNESS   (Location/Symptom, Timing/Onset, Context/Setting, Quality, Duration, Modifying Factors, Severity)  Note limiting factors.   Patient is a 74-year-old female history of prior stroke, diabetes, GERD, hypertension presenting today secondary to low blood sugar.  She had a recent admission to [facility] for hypertension, altered mental status, hyperglycemia.  She was discharged to a SNF and recently transferred back to her assisted living facility where they manage her medications and check on her 4 times a day.  Today they found her to be hypoglycemic with decrease in mental status.  She was hypoglycemic in the 50s.  She was given something to eat but no improvement so EMS was called.  When EMS arrived her sugar was in the 60s and they ultimately had to give her D10 IV with improvement sugar.  Upon arrival here patient is yelling that she wants to go back home.  She has no active complaints.  She is uncertain if she ate today.  She denies chest pain, shortness of breath, abdominal pain and vomiting.            Review of External Medical Records:     Nursing Notes were reviewed.    REVIEW OF SYSTEMS    (2-9 systems for level 4, 10 or more for level 5)     Review of Systems   Endocrine:        + Hypoglycemia       Except as noted above the remainder of the review of systems was reviewed and negative.       PAST MEDICAL HISTORY     Past Medical History:   Diagnosis Date    Arthritis     Chronic pain     Diabetes (HCC)     GERD (gastroesophageal reflux disease)     Hypertension     Ill-defined condition     ucerative colitis    Ulcerative colitis (HCC)     Vertigo          SURGICAL HISTORY       Past Surgical

## 2025-01-06 ENCOUNTER — HOSPITAL ENCOUNTER (EMERGENCY)
Facility: HOSPITAL | Age: 75
Discharge: HOME | End: 2025-01-06
Attending: STUDENT IN AN ORGANIZED HEALTH CARE EDUCATION/TRAINING PROGRAM
Payer: MEDICARE

## 2025-01-06 VITALS
DIASTOLIC BLOOD PRESSURE: 107 MMHG | OXYGEN SATURATION: 95 % | TEMPERATURE: 97.6 F | RESPIRATION RATE: 18 BRPM | HEART RATE: 118 BPM | SYSTOLIC BLOOD PRESSURE: 188 MMHG

## 2025-01-06 DIAGNOSIS — E86.0 DEHYDRATION: ICD-10-CM

## 2025-01-06 DIAGNOSIS — E16.2 HYPOGLYCEMIA: Primary | ICD-10-CM

## 2025-01-06 LAB
ANION GAP SERPL CALC-SCNC: 4 MMOL/L (ref 2–12)
BASOPHILS # BLD: 0.1 K/UL (ref 0–0.1)
BASOPHILS NFR BLD: 1 % (ref 0–1)
BUN SERPL-MCNC: 20 MG/DL (ref 6–20)
BUN/CREAT SERPL: 13 (ref 12–20)
CALCIUM SERPL-MCNC: 9.5 MG/DL (ref 8.5–10.1)
CHLORIDE SERPL-SCNC: 106 MMOL/L (ref 97–108)
CO2 SERPL-SCNC: 26 MMOL/L (ref 21–32)
COMMENT:: NORMAL
CREAT SERPL-MCNC: 1.53 MG/DL (ref 0.55–1.02)
DIFFERENTIAL METHOD BLD: ABNORMAL
EKG ATRIAL RATE: 101 BPM
EKG DIAGNOSIS: NORMAL
EKG P AXIS: 47 DEGREES
EKG P-R INTERVAL: 128 MS
EKG Q-T INTERVAL: 354 MS
EKG QRS DURATION: 78 MS
EKG QTC CALCULATION (BAZETT): 459 MS
EKG R AXIS: -8 DEGREES
EKG T AXIS: -5 DEGREES
EKG VENTRICULAR RATE: 101 BPM
EOSINOPHIL # BLD: 0 K/UL (ref 0–0.4)
EOSINOPHIL NFR BLD: 0 % (ref 0–7)
ERYTHROCYTE [DISTWIDTH] IN BLOOD BY AUTOMATED COUNT: 14.8 % (ref 11.5–14.5)
GLUCOSE BLD STRIP.AUTO-MCNC: 163 MG/DL (ref 65–117)
GLUCOSE BLD STRIP.AUTO-MCNC: 168 MG/DL (ref 65–117)
GLUCOSE BLD STRIP.AUTO-MCNC: 239 MG/DL (ref 65–117)
GLUCOSE SERPL-MCNC: 320 MG/DL (ref 65–100)
HCT VFR BLD AUTO: 45.3 % (ref 35–47)
HGB BLD-MCNC: 15 G/DL (ref 11.5–16)
IMM GRANULOCYTES # BLD AUTO: 0.1 K/UL (ref 0–0.04)
IMM GRANULOCYTES NFR BLD AUTO: 1 % (ref 0–0.5)
LYMPHOCYTES # BLD: 1.9 K/UL (ref 0.8–3.5)
LYMPHOCYTES NFR BLD: 22 % (ref 12–49)
MCH RBC QN AUTO: 30.3 PG (ref 26–34)
MCHC RBC AUTO-ENTMCNC: 33.1 G/DL (ref 30–36.5)
MCV RBC AUTO: 91.5 FL (ref 80–99)
MONOCYTES # BLD: 0.9 K/UL (ref 0–1)
MONOCYTES NFR BLD: 11 % (ref 5–13)
NEUTS SEG # BLD: 5.8 K/UL (ref 1.8–8)
NEUTS SEG NFR BLD: 65 % (ref 32–75)
NRBC # BLD: 0 K/UL (ref 0–0.01)
NRBC BLD-RTO: 0 PER 100 WBC
PLATELET # BLD AUTO: 292 K/UL (ref 150–400)
PMV BLD AUTO: 10.1 FL (ref 8.9–12.9)
POTASSIUM SERPL-SCNC: 4.2 MMOL/L (ref 3.5–5.1)
PROCALCITONIN SERPL-MCNC: 0.13 NG/ML
RBC # BLD AUTO: 4.95 M/UL (ref 3.8–5.2)
SERVICE CMNT-IMP: ABNORMAL
SODIUM SERPL-SCNC: 136 MMOL/L (ref 136–145)
SPECIMEN HOLD: NORMAL
WBC # BLD AUTO: 8.7 K/UL (ref 3.6–11)

## 2025-01-06 PROCEDURE — 93005 ELECTROCARDIOGRAM TRACING: CPT | Performed by: EMERGENCY MEDICINE

## 2025-01-06 PROCEDURE — 80048 BASIC METABOLIC PNL TOTAL CA: CPT

## 2025-01-06 PROCEDURE — 36415 COLL VENOUS BLD VENIPUNCTURE: CPT

## 2025-01-06 PROCEDURE — 85025 COMPLETE CBC W/AUTO DIFF WBC: CPT

## 2025-01-06 PROCEDURE — 93010 ELECTROCARDIOGRAM REPORT: CPT | Performed by: INTERNAL MEDICINE

## 2025-01-06 PROCEDURE — 82962 GLUCOSE BLOOD TEST: CPT

## 2025-01-06 PROCEDURE — 84145 PROCALCITONIN (PCT): CPT

## 2025-01-06 RX ORDER — ONDANSETRON 2 MG/ML
4 INJECTION INTRAMUSCULAR; INTRAVENOUS EVERY 6 HOURS PRN
Status: DISCONTINUED | OUTPATIENT
Start: 2025-01-06 | End: 2025-01-06

## 2025-01-06 ASSESSMENT — PAIN - FUNCTIONAL ASSESSMENT: PAIN_FUNCTIONAL_ASSESSMENT: NONE - DENIES PAIN

## 2025-01-06 NOTE — ED NOTES
Pt report given at this time to Karin Coreas .     Pt care plan is to obs BG until 830 per MD Harper. Pt condition to be reevaluated at that time for DC v ED OBS with Case Management in the morning.

## 2025-01-06 NOTE — ED PROVIDER NOTES
Patient received as transfer from outside facility for case management evaluation.  She presented to outside hospital with hypoglycemia.  With request to be discharged home.  There is concern about her medical decision making capacity.  There is concerned about her safety to be at assisted living with likely decreased p.o. intake over the last several days.  She is noted to have somewhat worsening renal function.  No fevers.  Previous lab workup also demonstrates leukocytosis without clear source of infection.  Urine and chest x-ray were reassuring and patient does not have complaints of infectious source.  On my evaluation in the emergency department on arrival she is agitated and tachycardic, suspect tachycardia likely due to agitation.  Will repeat BMP, send PCT.  Search for occult infectious etiology.  Potentially her previously reported hypoglycemia is due to renal impairment with her diabetic medications.  Case management made aware of her arrival.  Discussed with bedside nurse.        1215: Patient has been seen by case management.  She is safe to go back to her facility, they will work on transitioning to higher level of care in the community with her .  Reviewed her MAR, recommend discontinuing glipizide, should not be on oral antihyperglycemics, can resume Lantus and SSI     Robin Pina MD  01/06/25 1214

## 2025-01-06 NOTE — PROGRESS NOTES
Request per Froy Fernandez CM received for safe transfer prior to discharge.     Patient greeted on [facility] stretcher, very hard of hearing, patient repeated basic questions repeatedly.  I introduced myself and with [facility], patient receptive.  Patient expressed that she needed to void, shoes placed for patient, assisted her with getting dressed and walker in place, patient was able to stand and take 3 steps with shuffled gait to pivot into wheelchair.   Patient assisted into the bathroom, she was able to stand and pivot onto the commode using grab bars to assist as well as assist x 1. Patient was dressed with clothes from home and dry adult brief in place.  Upon returning to stretcher, patient refused to get back onto the stretcher and asked to stay in the wheelchair, transport arriving within the next 30 min, snack provided with arms down on the wheelchair.   Patient's RN notified.   Froy Fernandez CM updated, safe for discharge back to facility in wheelchair with transport team.     Julia Prieto APRN-NP  [facility] ED Geriatric Consult  [facility]  [facility] [phone]   Mon-Thur -7:30am-5:30pm

## 2025-01-06 NOTE — CARE COORDINATION
1145 Call received from HERBIE/ Nithya Cheema informed this writer she was able to visit patient while at [facility] however noticed change once returned back to [facility].  Increased yelling and not saying that was not her home. Currently facility is utilizing highest level of care and has 24/7 sitter. BS over the weekend ranged from 248-113 over the weekend. Patient had been able to go to the dining crain for some meals and other meals brought to patient w/ sitter.     D/C papers can be faxed to [phone]. [facility] and patient will be followed by team.     [facility] is able to accept patient back today.     Updates provided to Dr. Robin Pina.     1230 Spoke w/ Bill regarding transport back to facility and is in agreement w/ return to [facility] and to work on next LOC. Resources received by email. Patient utilizes a wheelchair and discussed private pay w/c services. Preference is [facility] call placed to provider spoke w/ Reynold next available is 1600/1630. Alternative call placed to [facility] alonso jaime/ Olivier cost is $145.63. Updates provided to Bill and will make payment w/ provider.

## 2025-01-06 NOTE — ED NOTES
Attmpted to call report to [facility] but was told the accepting Dr didn't give report in handoff. RPRN

## 2025-01-06 NOTE — ED NOTES
Assumed care of pt at this time from ALONDRA Antunez with reports on pt. Pt needs glucose at 0830, and to document fluid that was already started. PT is speaking to provider at this time MARIA ALEJANDRARN

## 2025-01-06 NOTE — CARE COORDINATION
CM consult received and appreciated. EMR reviewed. Noted recent hospitalization - and discharged to [facility]. Patient returned back to her [facility] [place] on Friday and presented to [facility] from facility for low blood sugar and will need reassessment for FAISAL.     Met w/patient and introduced to role/self.  is alert however states she is ready to go when asked her name and  only states first name and says \" I don't know.\"     CM will contact primary decision maker Fabien Leon [phone] for additional history and JONO planning.     1115 Call placed to [facility] spoke w/ Stacy, [place] [phone] informed patient received back on Friday and facility provided sitters over the weekend d/t patient increased shouting (history of anxiety). DON is Nithya and will return CM call back this writer regarding processes and patient needs.     Call placed to ex spouse Fabien Leon [phone] introduced to role. Additional history obtained patient has resided at facility since 2022 (history of CVA)  and he is her POA/ Decision Maker. Patient has a [facility] Policy (LTC) . Cost of current facility LOC is $9K + /month[place]  [place] facilities closer to him however has been utilizing [facility] for travel. Fabien asked if patient could stay in the  ED tonight. This writer informed has call to [facility] and will also follow up w/ [facility] however uncertain if could place from the ED today as plan. This writer explain of possible plan if not admitted to return back to [facility] today w/ referrals placed in the community and for him to work w/ facility to have appropriate placement when need. CM will send list of SNFs and  Resources. Fabien asked about C[facility] this writer acknowledge name for [facility] as alternative resource.     Fabien informed this writer had attempted to return Karlee's dog to her this weekend however staff

## 2025-01-06 NOTE — ED NOTES
ED SIGN OUT NOTE  Care assumed at [facility] 7:09 PM EST    Patient was signed out to me by Dr. Brooks.   Briefly, pt recently admitted for hyperglycemia, AMS, htn emergency, dc to SNF, pt received insulin without eating, was hypoglycemic, blood sugar 60-90 now after eating. Mild BALA, getting IV fluids. Case management consulted. Pt needs require reassessment of level of care at [facility].     Patient is awaiting Case management and monitor glucose.    BP (!) 197/93   Pulse 63   Temp 98 °F (36.7 °C) (Tympanic)   Resp 18   SpO2 96%     Labs Reviewed   URINALYSIS WITH MICROSCOPIC - Abnormal; Notable for the following components:       Result Value    Protein,  (*)     All other components within normal limits   CBC WITH AUTO DIFFERENTIAL - Abnormal; Notable for the following components:    WBC 13.9 (*)     RBC 5.22 (*)     Hematocrit 47.3 (*)     RDW 14.8 (*)     Immature Granulocytes % 1 (*)     Lymphocytes Absolute 3.8 (*)     Monocytes Absolute 1.8 (*)     Immature Granulocytes Absolute 0.1 (*)     All other components within normal limits   COMPREHENSIVE METABOLIC PANEL - Abnormal; Notable for the following components:    Potassium 3.3 (*)     Glucose 56 (*)     BUN 23 (*)     Creatinine 1.81 (*)     Est, Glom Filt Rate 29 (*)     Alk Phosphatase 129 (*)     Albumin/Globulin Ratio 1.0 (*)     All other components within normal limits   POCT GLUCOSE - Abnormal; Notable for the following components:    POC Glucose 129 (*)     All other components within normal limits   POCT GLUCOSE - Abnormal; Notable for the following components:    POC Glucose 179 (*)     All other components within normal limits   POCT GLUCOSE - Abnormal; Notable for the following components:    POC Glucose 220 (*)     All other components within normal limits   POCT GLUCOSE - Abnormal; Notable for the following components:    POC Glucose 255 (*)     All other components within normal limits   POCT GLUCOSE - Abnormal; Notable for

## 2025-01-06 NOTE — ED NOTES
Attempted to call report at s time was told by ALONDRA Neff that they would not be taking this pt due to capacity Per [facility]. Charge RN aware. RPRN

## 2025-01-06 NOTE — ED NOTES
[facility] contacted for kaylee, BRENDON 5604, primary RN Karin notified. Dr. Alexis to accept to [facility] ED per Dr. Antunez.

## 2025-01-06 NOTE — ED NOTES
TRANSFER - OUT REPORT:    Verbal report given to Jany on Karlee Leon  being transferred to [facility] ED for routine progression of patient care       Report consisted of patient's Situation, Background, Assessment and   Recommendations(SBAR).     Information from the following report(s) Nurse Handoff Report, ED Encounter Summary, ED SBAR, Intake/Output, MAR, Recent Results, and Neuro Assessment was reviewed with the receiving nurse.    [place] Fall Assessment:    Presents to emergency department  because of falls (Syncope, seizure, or loss of consciousness): No  Age > 70: Yes  Altered Mental Status, Intoxication with alcohol or substance confusion (Disorientation, impaired judgment, poor safety awaremess, or inability to follow instructions): Yes  Impaired Mobility: Ambulates or transfers with assistive devices or assistance; Unable to ambulate or transer.: Yes  Nursing Judgement: Yes          Lines:   Peripheral IV 01/05/25 Left Antecubital (Active)   Site Assessment Clean, dry & intact 01/05/25 1635   Line Status Blood return noted;Normal saline locked;Specimen collected 01/05/25 1635   Line Care Connections checked and tightened;Line pulled back 01/05/25 1635   Alcohol Cap Used No 01/05/25 1635   Dressing Status New dressing applied;Clean, dry & intact 01/05/25 1635   Dressing Type Transparent 01/05/25 1635   Dressing Intervention New 01/05/25 1635        Opportunity for questions and clarification was provided.      Patient transported with:  Tech, [facility] ETA 0720

## 2025-01-06 NOTE — DISCHARGE INSTR - COC
Bearing Status/Restrictions: { CC Weight Bearin}  Other Medical Equipment (for information only, NOT a DME order):  {EQUIPMENT:926135533}  Other Treatments: ***    Patient's personal belongings (please select all that are sent with patient):  {CHP DME Belongings:824030774}    RN SIGNATURE:  {Esignature:616724090}    CASE MANAGEMENT/SOCIAL WORK SECTION    Inpatient Status Date: ***    Readmission Risk Assessment Score:  [facility] RISK OF UNPLANNED READMISSION 2.0             0 Total Score        Discharging to Facility/ Agency   Name:   Address:  Phone:  Fax:    Dialysis Facility (if applicable)   Name:  Address:  Dialysis Schedule:  Phone:  Fax:    / signature: {Esignature:477136107}    PHYSICIAN SECTION    Prognosis: {Prognosis:5014563113}    Condition at Discharge: { Patient Condition:851952825}    Rehab Potential (if transferring to Rehab): {Prognosis:6925505600}    Recommended Labs or Other Treatments After Discharge: ***    Physician Certification: I certify the above information and transfer of Karlee Leon  is necessary for the continuing treatment of the diagnosis listed and that she requires {Admit to Appropriate Level of Care:76285} for {GREATER/LESS:730475431} 30 days.     Update Admission H&P: {CHP DME Changes in HandP:261604684}    PHYSICIAN SIGNATURE:  {Esignature:698914127}

## 2025-01-06 NOTE — ED NOTES
[facility] on site for transfer, SBAR out to crew. Repeat Blood sugar 168, patient eating oatmeal and sandwich, provided with drink to go.

## 2025-01-06 NOTE — ED NOTES
Verbal shift change report given to Adela (oncoming nurse) by Karin (offgoing nurse). Report included the following information Nurse Handoff Report, ED Encounter Summary, ED SBAR, MAR, Recent Results, and Neuro Assessment. Waiting on transfer to [facility] later this morning for Case Management consults.

## 2025-02-24 ENCOUNTER — HOSPITAL ENCOUNTER (EMERGENCY)
Facility: HOSPITAL | Age: 75
Discharge: HOME | End: 2025-02-24
Attending: EMERGENCY MEDICINE
Payer: MEDICARE

## 2025-02-24 ENCOUNTER — APPOINTMENT (OUTPATIENT)
Facility: HOSPITAL | Age: 75
End: 2025-02-24
Payer: MEDICARE

## 2025-02-24 VITALS
RESPIRATION RATE: 17 BRPM | SYSTOLIC BLOOD PRESSURE: 138 MMHG | WEIGHT: 157.19 LBS | HEART RATE: 92 BPM | DIASTOLIC BLOOD PRESSURE: 89 MMHG | HEIGHT: 62 IN | TEMPERATURE: 98.6 F | OXYGEN SATURATION: 97 % | BODY MASS INDEX: 28.93 KG/M2

## 2025-02-24 DIAGNOSIS — S00.03XA HEMATOMA OF SCALP, INITIAL ENCOUNTER: ICD-10-CM

## 2025-02-24 DIAGNOSIS — S09.90XA INJURY OF HEAD, INITIAL ENCOUNTER: Primary | ICD-10-CM

## 2025-02-24 PROCEDURE — 6360000002 HC RX W HCPCS: Performed by: EMERGENCY MEDICINE

## 2025-02-24 PROCEDURE — 99284 EMERGENCY DEPT VISIT MOD MDM: CPT

## 2025-02-24 PROCEDURE — 96372 THER/PROPH/DIAG INJ SC/IM: CPT

## 2025-02-24 PROCEDURE — 70450 CT HEAD/BRAIN W/O DYE: CPT

## 2025-02-24 RX ORDER — HALOPERIDOL 5 MG/ML
2 INJECTION INTRAMUSCULAR
Status: COMPLETED | OUTPATIENT
Start: 2025-02-24 | End: 2025-02-24

## 2025-02-24 RX ORDER — MIDAZOLAM HYDROCHLORIDE 2 MG/2ML
2 INJECTION, SOLUTION INTRAMUSCULAR; INTRAVENOUS ONCE
Status: COMPLETED | OUTPATIENT
Start: 2025-02-24 | End: 2025-02-24

## 2025-02-24 RX ORDER — HALOPERIDOL 5 MG/ML
2 INJECTION INTRAMUSCULAR ONCE
Status: COMPLETED | OUTPATIENT
Start: 2025-02-24 | End: 2025-02-24

## 2025-02-24 RX ADMIN — MIDAZOLAM 2 MG: 1 INJECTION INTRAMUSCULAR; INTRAVENOUS at 14:30

## 2025-02-24 RX ADMIN — HALOPERIDOL LACTATE 2 MG: 5 INJECTION, SOLUTION INTRAMUSCULAR at 14:30

## 2025-02-24 RX ADMIN — MIDAZOLAM 2 MG: 1 INJECTION INTRAMUSCULAR; INTRAVENOUS at 16:00

## 2025-02-24 RX ADMIN — HALOPERIDOL LACTATE 2 MG: 5 INJECTION, SOLUTION INTRAMUSCULAR at 16:00

## 2025-02-24 ASSESSMENT — PAIN SCALES - PAIN ASSESSMENT IN ADVANCED DEMENTIA (PAINAD)
TOTALSCORE: 3
NEGVOCALIZATION: OCCASIONAL MOAN/GROAN, LOW SPEECH, NEGATIVE/DISAPPROVING QUALITY
BREATHING: NORMAL
FACIALEXPRESSION: SMILING OR INEXPRESSIVE
BODYLANGUAGE: TENSE, DISTRESSED PACING, FIDGETING
CONSOLABILITY: DISTRACTED OR REASSURED BY VOICE/TOUCH

## 2025-02-24 ASSESSMENT — PAIN - FUNCTIONAL ASSESSMENT: PAIN_FUNCTIONAL_ASSESSMENT: PAIN ASSESSMENT IN ADVANCED DEMENTIA (PAINAD)

## 2025-02-24 NOTE — PROGRESS NOTES
Spiritual Health History and Assessment/Progress Note  [facility]    Initial Encounter,  ,  ,      Name: Karlee Leon MRN: [id number]    Age: 74 y.o.     Sex: female   Language: English   Pentecostal: Mandaen   <principal problem not specified>     Date: 2/24/2025            Total Time Calculated: 30 min              Spiritual Assessment began in [facility] DEPARTMENT        Referral/Consult From: Rounding   Encounter Overview/Reason: Initial Encounter  Service Provided For: Patient    Hortensia, Belief, Meaning:   Patient unable to assess at this time, per Medical record, patient is Mandaen  Family/Friends No family/friends present      Importance and Influence:  Patient has no beliefs influential to healthcare decision-making identified during this visit  Family/Friends No family/friends present    Community:  Patient Other: lives in an assisted living facility, per staff.   Family/Friends No family/friends present    Assessment and Plan of Care:     Patient Interventions include: Facilitated expression of thoughts and feelings and Affirmed coping skills/support systems     sat by patient for a little while, trying to provide supportive presence, as patient is agitated. RN and I played some music, the [employer], which when  asked about favorite song, she did mouth, \"Hold Me Tight\" and seemed to sing 1-2 words at one point, but patient continues to appear confused and agitated.  Medical staff attending to.     Family/Friends Interventions include: No family/friends present    Patient Plan of Care: Spiritual Care available upon further referral  Family/Friends Plan of Care: Spiritual Care available upon further referral    Electronically signed by TIMOTEO Rodriguez on 2/24/2025 at 3:32 PM

## 2025-02-24 NOTE — ED NOTES
Patient resting in bed at this time. Awake, alert. Patient in view of nursing station. Brief clean and dry. Purewick in place.

## 2025-02-24 NOTE — ED TRIAGE NOTES
Patient arrived from [facility] via EMS. Patient had a GLF over the weekend (Saturday). Patient had bruising over the right eye, skin tears on the right arm. Per facility patient at her baseline. Patient is on Plavix. The fall was unwitnessed, unsure of LOC.

## 2025-02-24 NOTE — ED PROVIDER NOTES
[facility] EMERGENCY DEPARTMENT  EMERGENCY DEPARTMENT ENCOUNTER      Pt Name: Karlee Leon  MRN: [id number]  Birthdate 1950  Date of evaluation: 2/24/2025  Provider: Xavi Hoang MD    CHIEF COMPLAINT       Chief Complaint   Patient presents with    Fall    Head Injury       ALLERGIES     Patient has no known allergies.    ENCOUNTER     HISTORY OF PRESENT ILLNESS:    A 74-year-old female with a history of advanced dementia was brought to the Emergency Department by EMS for memory care and evaluation after a fall. The history was provided by EMS and the nursing facility. The patient has a history of multiple falls and experienced a fall 2 days ago, resulting in bruising today. She is currently experiencing agitation, disorientation, and repetitive yelling, which are consistent with her baseline behavior. Upon arrival, she was noted to be quite agitated.    PAST MEDICAL HISTORY:    - History of advanced dementia    PHYSICAL EXAM:    General Appearance: The patient is a 74-year-old female, agitated, disoriented, and repetitively yelling. She is trying to crawl out of the stretcher.    Head: Right frontal scalp swelling with ecchymosis, no palpable step-off or deformity.    Eyes: Pupils equal, round, and reactive to light.    Ears, Nose, Throat: No abnormalities noted.    Neck: Supple, no lymphadenopathy.    Cardiovascular: Regular rate and rhythm, no murmurs, rubs, or gallops.    Respiratory: Clear to auscultation bilaterally, no wheezes, rales, or rhonchi.    Abdomen: Soft, non-tender, no organomegaly.    Musculoskeletal: Healing skin tears on right forearm and left lower leg, bandaged.    Neurological: No focal neurologic deficits observed. Patient is disoriented and agitated, which is at her baseline.    Skin: Right frontal scalp swelling with ecchymosis, healing skin tears on right forearm and left lower leg.    Psychiatric: Agitated and disoriented, repetitively yelling, consistent with baseline

## 2025-02-24 NOTE — ED NOTES
1510  Change of shift. Care of patient taken over from Dr. Hoang; H&P reviewed, bedside handoff complete.  Awaiting CT head.  Patient received Haldol and Versed.    1540  Patient still screaming and somewhat agitated.  Unable to complete CT due to the agitation.  Will give additional Haldol and Versed for the agitation in order to obtain the head CT.    1844  Head ct with scalp hematoma.  No acute intracranial process.  I attempted to call Fabien Carolyn at [phone].  I left voicemail to call this emergency department.  Will discharge patient back to her skilled nursing facility.    1845  Patient's results have been reviewed with them.  Patient and/or family have verbally conveyed their understanding and agreement of the patient's signs, symptoms, diagnosis, treatment and prognosis and additionally agree to follow up as recommended or return to the Emergency Room should their condition change prior to follow-up.  Discharge instructions have also been provided to the patient with some educational information regarding their diagnosis as well a list of reasons why they would want to return to the ER prior to their follow-up appointment should their condition change.    No results found for this or any previous visit (from the past 24 hour(s)).    CT HEAD WO CONTRAST   Final Result      Right frontal scalp swelling with no acute intracranial injury allowing for   motion artifact.         Electronically signed by Marty Mejia MD  02/24/25 2816

## 2025-02-24 NOTE — ED NOTES
Patient continues to be agitated after medications. Yelling and pulling off clothes, and dropping belongings in the floor.

## 2025-02-24 NOTE — ED NOTES
Patient continually agitated during hospital stay. Taking off clothing, yelling, and upset. CT attempted to get head CT and patient would not lay still for scan. MD made aware new orders placed.

## 2025-02-24 NOTE — ED NOTES
Patient attempted to get out of bed. This RN and another RN got patient back in bed without incident. Continues to be agitated at this time.

## 2025-03-21 ENCOUNTER — HOSPITAL ENCOUNTER (EMERGENCY)
Facility: HOSPITAL | Age: 75
Discharge: HOME | End: 2025-03-21
Attending: EMERGENCY MEDICINE
Payer: MEDICARE

## 2025-03-21 VITALS
BODY MASS INDEX: 28.47 KG/M2 | RESPIRATION RATE: 14 BRPM | TEMPERATURE: 98.2 F | WEIGHT: 155.65 LBS | HEART RATE: 111 BPM | OXYGEN SATURATION: 97 % | DIASTOLIC BLOOD PRESSURE: 84 MMHG | SYSTOLIC BLOOD PRESSURE: 168 MMHG

## 2025-03-21 DIAGNOSIS — F01.518: Primary | ICD-10-CM

## 2025-03-21 LAB
ALBUMIN SERPL-MCNC: 3.3 G/DL (ref 3.5–5)
ALBUMIN/GLOB SERPL: 0.8 (ref 1.1–2.2)
ALP SERPL-CCNC: 183 U/L (ref 45–117)
ALT SERPL-CCNC: 20 U/L (ref 12–78)
ANION GAP SERPL CALC-SCNC: 3 MMOL/L (ref 2–12)
APPEARANCE UR: CLEAR
AST SERPL-CCNC: 22 U/L (ref 15–37)
BACTERIA URNS QL MICRO: NEGATIVE /HPF
BASOPHILS # BLD: 0.1 K/UL (ref 0–0.1)
BASOPHILS NFR BLD: 1.1 % (ref 0–1)
BILIRUB SERPL-MCNC: 0.5 MG/DL (ref 0.2–1)
BILIRUB UR QL: NEGATIVE
BUN SERPL-MCNC: 17 MG/DL (ref 6–20)
BUN/CREAT SERPL: 11 (ref 12–20)
CALCIUM SERPL-MCNC: 9.6 MG/DL (ref 8.5–10.1)
CHLORIDE SERPL-SCNC: 108 MMOL/L (ref 97–108)
CO2 SERPL-SCNC: 27 MMOL/L (ref 21–32)
COLOR UR: ABNORMAL
COMMENT:: NORMAL
CREAT SERPL-MCNC: 1.57 MG/DL (ref 0.55–1.02)
DIFFERENTIAL METHOD BLD: ABNORMAL
EKG ATRIAL RATE: 119 BPM
EKG DIAGNOSIS: NORMAL
EKG P AXIS: -4 DEGREES
EKG P-R INTERVAL: 152 MS
EKG Q-T INTERVAL: 370 MS
EKG QRS DURATION: 82 MS
EKG QTC CALCULATION (BAZETT): 520 MS
EKG R AXIS: -5 DEGREES
EKG T AXIS: -37 DEGREES
EKG VENTRICULAR RATE: 119 BPM
EOSINOPHIL # BLD: 0 K/UL (ref 0–0.4)
EOSINOPHIL NFR BLD: 0 % (ref 0–7)
EPITH CASTS URNS QL MICRO: ABNORMAL /LPF
ERYTHROCYTE [DISTWIDTH] IN BLOOD BY AUTOMATED COUNT: 15.1 % (ref 11.5–14.5)
GLOBULIN SER CALC-MCNC: 3.9 G/DL (ref 2–4)
GLUCOSE BLD STRIP.AUTO-MCNC: 229 MG/DL (ref 65–117)
GLUCOSE SERPL-MCNC: 257 MG/DL (ref 65–100)
GLUCOSE UR STRIP.AUTO-MCNC: >1000 MG/DL
HCT VFR BLD AUTO: 44.7 % (ref 35–47)
HGB BLD-MCNC: 14.5 G/DL (ref 11.5–16)
HGB UR QL STRIP: NEGATIVE
HYALINE CASTS URNS QL MICRO: ABNORMAL /LPF (ref 0–5)
IMM GRANULOCYTES # BLD AUTO: 0.04 K/UL (ref 0–0.04)
IMM GRANULOCYTES NFR BLD AUTO: 0.5 % (ref 0–0.5)
KETONES UR QL STRIP.AUTO: NEGATIVE MG/DL
LEUKOCYTE ESTERASE UR QL STRIP.AUTO: NEGATIVE
LYMPHOCYTES # BLD: 2.11 K/UL (ref 0.8–3.5)
LYMPHOCYTES NFR BLD: 24.3 % (ref 12–49)
MCH RBC QN AUTO: 30.2 PG (ref 26–34)
MCHC RBC AUTO-ENTMCNC: 32.4 G/DL (ref 30–36.5)
MCV RBC AUTO: 93.1 FL (ref 80–99)
MONOCYTES # BLD: 0.92 K/UL (ref 0–1)
MONOCYTES NFR BLD: 10.6 % (ref 5–13)
NEUTS SEG # BLD: 5.53 K/UL (ref 1.8–8)
NEUTS SEG NFR BLD: 63.5 % (ref 32–75)
NITRITE UR QL STRIP.AUTO: NEGATIVE
NRBC # BLD: 0 K/UL (ref 0–0.01)
NRBC BLD-RTO: 0 PER 100 WBC
PH UR STRIP: 5.5 (ref 5–8)
PLATELET # BLD AUTO: 366 K/UL (ref 150–400)
PMV BLD AUTO: 10 FL (ref 8.9–12.9)
POTASSIUM SERPL-SCNC: 3.7 MMOL/L (ref 3.5–5.1)
PROT SERPL-MCNC: 7.2 G/DL (ref 6.4–8.2)
PROT UR STRIP-MCNC: ABNORMAL MG/DL
RBC # BLD AUTO: 4.8 M/UL (ref 3.8–5.2)
RBC #/AREA URNS HPF: ABNORMAL /HPF (ref 0–5)
SERVICE CMNT-IMP: ABNORMAL
SODIUM SERPL-SCNC: 138 MMOL/L (ref 136–145)
SP GR UR REFRACTOMETRY: 1.02 (ref 1–1.03)
SPECIMEN HOLD: NORMAL
URINE CULTURE IF INDICATED: ABNORMAL
UROBILINOGEN UR QL STRIP.AUTO: 0.2 EU/DL (ref 0.2–1)
WBC # BLD AUTO: 8.7 K/UL (ref 3.6–11)
WBC URNS QL MICRO: ABNORMAL /HPF (ref 0–4)

## 2025-03-21 PROCEDURE — 99284 EMERGENCY DEPT VISIT MOD MDM: CPT

## 2025-03-21 PROCEDURE — 2580000003 HC RX 258: Performed by: EMERGENCY MEDICINE

## 2025-03-21 PROCEDURE — 85025 COMPLETE CBC W/AUTO DIFF WBC: CPT

## 2025-03-21 PROCEDURE — 82962 GLUCOSE BLOOD TEST: CPT

## 2025-03-21 PROCEDURE — 93005 ELECTROCARDIOGRAM TRACING: CPT | Performed by: EMERGENCY MEDICINE

## 2025-03-21 PROCEDURE — 80053 COMPREHEN METABOLIC PANEL: CPT

## 2025-03-21 PROCEDURE — 93010 ELECTROCARDIOGRAM REPORT: CPT | Performed by: INTERNAL MEDICINE

## 2025-03-21 PROCEDURE — 81001 URINALYSIS AUTO W/SCOPE: CPT

## 2025-03-21 RX ORDER — 0.9 % SODIUM CHLORIDE 0.9 %
500 INTRAVENOUS SOLUTION INTRAVENOUS ONCE
Status: COMPLETED | OUTPATIENT
Start: 2025-03-21 | End: 2025-03-21

## 2025-03-21 RX ADMIN — SODIUM CHLORIDE 500 ML: 0.9 INJECTION, SOLUTION INTRAVENOUS at 16:15

## 2025-03-21 ASSESSMENT — PAIN - FUNCTIONAL ASSESSMENT: PAIN_FUNCTIONAL_ASSESSMENT: NONE - DENIES PAIN

## 2025-03-21 NOTE — ED PROVIDER NOTES
[facility] EMERGENCY DEPARTMENT  EMERGENCY DEPARTMENT ENCOUNTER      Pt Name: Karlee Leon  MRN: [id number]  Birthdate 1950  Date of evaluation: 3/21/2025  Provider: Robin Pina MD      HISTORY OF PRESENT ILLNESS      74-year-old female history of diabetes, GERD, hypertension, ulcerative colitis, chronic microvascular ischemia presents to the emergency department by EMS because the nursing home felt that she was acting out when she did not want to take her scheduled Ativan.  There are several emergency department visits for similar concerns.  My evaluation patient's only complaint is that she has to use the restroom.    The history is provided by the patient, the EMS personnel and medical records.           Nursing Notes were reviewed.    REVIEW OF SYSTEMS         Review of Systems        PAST MEDICAL HISTORY     Past Medical History:   Diagnosis Date    Arthritis     Chronic pain     Diabetes (HCC)     GERD (gastroesophageal reflux disease)     Hypertension     Ill-defined condition     ucerative colitis    Ulcerative colitis (HCC)     Vertigo          SURGICAL HISTORY       Past Surgical History:   Procedure Laterality Date    GI      colonoscopy    ORTHOPEDIC SURGERY Right 2014    FOOT    OTHER SURGICAL HISTORY      finger surgery, foot surgery         CURRENT MEDICATIONS       Previous Medications    ACETAMINOPHEN (TYLENOL) 325 MG TABLET    Take 2 tablets by mouth every 6 hours as needed    ATORVASTATIN (LIPITOR) 80 MG TABLET    Take 1 tablet by mouth nightly    CLONIDINE (CATAPRES) 0.1 MG TABLET    Take 1 tablet by mouth 2 times daily    CLOPIDOGREL (PLAVIX) 75 MG TABLET    Take 1 tablet by mouth daily    ESCITALOPRAM (LEXAPRO) 5 MG TABLET    Take 1 tablet by mouth daily    FAMOTIDINE (PEPCID) 20 MG TABLET    Take 1 tablet by mouth daily    HEPARIN, PORCINE, 5000 UNIT/ML INJECTION    Inject 1 mL into the skin in the morning and 1 mL at noon and 1 mL in the evening.    HYDRALAZINE

## 2025-03-21 NOTE — DISCHARGE INSTRUCTIONS
Please follow-up with your primary care doctor.  In general scheduled benzodiazepine medications are discouraged.  Please review your medication list with your physician.

## 2025-03-21 NOTE — ED TRIAGE NOTES
Pt arrives via EMS from [facility] with cc of altered mental. Staff stated pt was refusing take meds, spitting, and hitting. Staff also states hyperglycemia. In route .

## 2025-03-22 ENCOUNTER — HOSPITAL ENCOUNTER (EMERGENCY)
Facility: HOSPITAL | Age: 75
Discharge: HOME | End: 2025-03-22
Attending: STUDENT IN AN ORGANIZED HEALTH CARE EDUCATION/TRAINING PROGRAM
Payer: MEDICARE

## 2025-03-22 VITALS
OXYGEN SATURATION: 98 % | SYSTOLIC BLOOD PRESSURE: 178 MMHG | DIASTOLIC BLOOD PRESSURE: 65 MMHG | HEART RATE: 78 BPM | TEMPERATURE: 98.4 F | RESPIRATION RATE: 18 BRPM

## 2025-03-22 DIAGNOSIS — S61.412A SKIN TEAR OF LEFT HAND WITHOUT COMPLICATION, INITIAL ENCOUNTER: ICD-10-CM

## 2025-03-22 DIAGNOSIS — S81.812A NONINFECTED SKIN TEAR OF LEFT LOWER EXTREMITY, INITIAL ENCOUNTER: Primary | ICD-10-CM

## 2025-03-22 DIAGNOSIS — S81.811A NONINFECTED SKIN TEAR OF LOWER EXTREMITY, RIGHT, INITIAL ENCOUNTER: ICD-10-CM

## 2025-03-22 PROCEDURE — 99283 EMERGENCY DEPT VISIT LOW MDM: CPT

## 2025-03-22 ASSESSMENT — PAIN - FUNCTIONAL ASSESSMENT: PAIN_FUNCTIONAL_ASSESSMENT: NONE - DENIES PAIN

## 2025-03-23 NOTE — ED TRIAGE NOTES
Patient arrived to ED via EMS from [place] with chief complaint of a skin tear to the left hand. Patient is confused at baseline, several bandages noticed on the patient both on the left leg on the shin and the right bicep, wounds appear to be healing.

## 2025-03-23 NOTE — ED PROVIDER NOTES
EMERGENCY DEPARTMENT PHYSICIAN NOTE     Patient: Karlee Leon     Time of Service: 3/22/2025  9:56 PM     Chief complaint:   Chief Complaint   Patient presents with    Hand Injury        HISTORY:  Patient is a 74 y.o. female who presents to the emergency department with complaints of fall with skin tears of the bilateral lower extremities and left hand.  Patient has significant dementia and behavioral disturbance based on chart review of medical history.  Patient was seen yesterday at another ED.  Patient's vital signs are stable.  Patient is rather upset and accusatory of staff which I suspect is secondary to patient's dementia.      Past Medical History:   Diagnosis Date    Arthritis     Behavioral disturbance due to multi-infarct dementia (HCC)     Chronic pain     Diabetes (HCC)     Falls     GERD (gastroesophageal reflux disease)     Hypertension     Ill-defined condition     ucerative colitis    Ulcerative colitis (HCC)     Vertigo         Past Surgical History:   Procedure Laterality Date    GI      colonoscopy    ORTHOPEDIC SURGERY Right 2014    FOOT    OTHER SURGICAL HISTORY      finger surgery, foot surgery        Family History   Problem Relation Age of Onset    Hearing Impairment Brother     Heart Disease Mother     Diabetes Father     Diabetes Mother         Social History     Socioeconomic History    Marital status: Single   Tobacco Use    Smoking status: Never     Passive exposure: Never    Smokeless tobacco: Never   Vaping Use    Vaping status: Never Used   Substance and Sexual Activity    Alcohol use: No    Drug use: No     Social Drivers of Health     Food Insecurity: No Food Insecurity (12/7/2024)    Hunger Vital Sign     Worried About Running Out of Food in the Last Year: Never true     Ran Out of Food in the Last Year: Never true        Current Medications: Reviewed in chart.    Allergies: No Known Allergies       REVIEW OF SYSTEMS: See HPI for pertinent positives and

## 2025-03-23 NOTE — DISCHARGE INSTRUCTIONS
You presented to the ED after falling and injuring your bilateral lower extremities with skin tears.  You have a skin tear of your left hand.  Skin is too thin for repair with sutures but it was repaired with Steri-Strips and reapproximated.  Follow-up with primary care for wound check.  If needed referral for wound care was provided for wound check of skin tear of left hand

## 2025-05-15 ENCOUNTER — HOSPITAL ENCOUNTER (EMERGENCY)
Facility: HOSPITAL | Age: 75
Discharge: HOME | End: 2025-05-15
Attending: STUDENT IN AN ORGANIZED HEALTH CARE EDUCATION/TRAINING PROGRAM
Payer: MEDICARE

## 2025-05-15 ENCOUNTER — APPOINTMENT (OUTPATIENT)
Facility: HOSPITAL | Age: 75
End: 2025-05-15
Payer: MEDICARE

## 2025-05-15 VITALS
SYSTOLIC BLOOD PRESSURE: 142 MMHG | TEMPERATURE: 99.9 F | DIASTOLIC BLOOD PRESSURE: 82 MMHG | HEIGHT: 62 IN | BODY MASS INDEX: 27.55 KG/M2 | RESPIRATION RATE: 16 BRPM | HEART RATE: 90 BPM | OXYGEN SATURATION: 95 % | WEIGHT: 149.69 LBS

## 2025-05-15 DIAGNOSIS — S09.90XA INJURY OF HEAD, INITIAL ENCOUNTER: ICD-10-CM

## 2025-05-15 DIAGNOSIS — S01.81XA FACIAL LACERATION, INITIAL ENCOUNTER: Primary | ICD-10-CM

## 2025-05-15 PROCEDURE — 12011 RPR F/E/E/N/L/M 2.5 CM/<: CPT

## 2025-05-15 PROCEDURE — 99284 EMERGENCY DEPT VISIT MOD MDM: CPT

## 2025-05-15 PROCEDURE — 72125 CT NECK SPINE W/O DYE: CPT

## 2025-05-15 PROCEDURE — 70450 CT HEAD/BRAIN W/O DYE: CPT

## 2025-05-15 RX ORDER — QUETIAPINE FUMARATE 25 MG/1
25 TABLET, FILM COATED ORAL 2 TIMES DAILY
COMMUNITY

## 2025-05-15 RX ORDER — MIDODRINE HYDROCHLORIDE 2.5 MG/1
2.5 TABLET ORAL 3 TIMES DAILY
COMMUNITY

## 2025-05-15 RX ORDER — LORAZEPAM 0.5 MG/1
0.5 TABLET ORAL EVERY 6 HOURS PRN
COMMUNITY

## 2025-05-15 RX ORDER — BUSPIRONE HYDROCHLORIDE 5 MG/1
5 TABLET ORAL 3 TIMES DAILY
COMMUNITY

## 2025-05-15 ASSESSMENT — LIFESTYLE VARIABLES
HOW OFTEN DO YOU HAVE A DRINK CONTAINING ALCOHOL: PATIENT UNABLE TO ANSWER
HOW MANY STANDARD DRINKS CONTAINING ALCOHOL DO YOU HAVE ON A TYPICAL DAY: PATIENT UNABLE TO ANSWER

## 2025-05-16 NOTE — ED NOTES
Lac wound above left eyebrow cleansed, wet to dry dressing and wrapped, assisted by nurse Dorys, pt tolerated well, redirected several times to not pull at the dressing, pt resting comfortably, vs updated, bed alarm pad, side rails up x2, bed to low and locked, glass door blinds open for direct observation

## 2025-05-16 NOTE — ED TRIAGE NOTES
Patient arrived to ED via EMS from the [facility]/ Per staff at around 2015 they hers patient screaming, she was found on the floor, laceration to the left eyebrow. Patient is on the Plavix, alert on arrival, per facility patient is at her baseline. Patient is staying on the memory care.

## 2025-05-16 NOTE — ED NOTES
Condition Stable  Patient discharged to [facility]  Patient education was completed  Education taught to Discharge instructions and results given to Hospital to Home  Teaching method used was handout and verbal  Understanding of teaching was n/a  Patient was discharged via stretcher with hospital to home  Discharged with hospital to home  Valuables were given to patient remained in possession of belongings during stay

## 2025-05-16 NOTE — ED PROVIDER NOTES
[facility] EMERGENCY DEPARTMENT  EMERGENCY DEPARTMENT ENCOUNTER      Pt Name: Karlee Leon  MRN: [id number]  Birthdate 1950  Date of evaluation: 5/15/2025  Provider: Sophia Puente DO    CHIEF COMPLAINT       Chief Complaint   Patient presents with    Head Injury    Laceration         HISTORY OF PRESENT ILLNESS    HPI    Karlee Leon is a 75 y.o. female with a past medical history of hypertension, diabetes, dementia who presents to the emergency department by EMS from nursing facility for evaluation of a fall.  Patient sustained ground-level fall, noted to have a laceration above the left eyebrow and skin tear over her left wrist.  She is on Plavix.  She is at her baseline mental status which is alert and oriented to self.    Nursing Notes were reviewed.    REVIEW OF SYSTEMS       Review of Systems   Unable to perform ROS: Dementia           PAST MEDICAL HISTORY     Past Medical History:   Diagnosis Date    Arthritis     Behavioral disturbance due to multi-infarct dementia (HCC)     Chronic pain     Diabetes (HCC)     Falls     GERD (gastroesophageal reflux disease)     Hypertension     Ill-defined condition     ucerative colitis    Ulcerative colitis (HCC)     Vertigo          SURGICAL HISTORY       Past Surgical History:   Procedure Laterality Date    GI      colonoscopy    ORTHOPEDIC SURGERY Right 2014    FOOT    OTHER SURGICAL HISTORY      finger surgery, foot surgery         CURRENT MEDICATIONS       Discharge Medication List as of 5/15/2025 10:37 PM        CONTINUE these medications which have NOT CHANGED    Details   LORazepam (ATIVAN) 0.5 MG tablet Take 1 tablet by mouth every 6 hours as needed for Anxiety. Max Daily Amount: 2 mgHistorical Med      midodrine (PROAMATINE) 2.5 MG tablet Take 1 tablet by mouth 3 times dailyHistorical Med      QUEtiapine (SEROQUEL) 25 MG tablet Take 1 tablet by mouth 2 times dailyHistorical Med      busPIRone (BUSPAR) 5 MG tablet Take 1 tablet by mouth

## 2025-06-16 ENCOUNTER — APPOINTMENT (OUTPATIENT)
Facility: HOSPITAL | Age: 75
DRG: 760 | End: 2025-06-16
Payer: MEDICARE

## 2025-06-16 ENCOUNTER — HOSPITAL ENCOUNTER (INPATIENT)
Facility: HOSPITAL | Age: 75
LOS: 3 days | Discharge: NURSING, REHAB OR LONG-TERM CARE | DRG: 760 | End: 2025-06-19
Attending: EMERGENCY MEDICINE | Admitting: INTERNAL MEDICINE
Payer: MEDICARE

## 2025-06-16 DIAGNOSIS — R19.00 PELVIC MASS: ICD-10-CM

## 2025-06-16 DIAGNOSIS — K59.00 CONSTIPATION, UNSPECIFIED CONSTIPATION TYPE: Primary | ICD-10-CM

## 2025-06-16 DIAGNOSIS — M79.89 LEG SWELLING: ICD-10-CM

## 2025-06-16 LAB
ALBUMIN SERPL-MCNC: 3.1 G/DL (ref 3.5–5)
ALBUMIN/GLOB SERPL: 0.8 (ref 1.1–2.2)
ALP SERPL-CCNC: 131 U/L (ref 45–117)
ALT SERPL-CCNC: 17 U/L (ref 12–78)
ANION GAP SERPL CALC-SCNC: 7 MMOL/L (ref 2–12)
APPEARANCE UR: CLEAR
AST SERPL-CCNC: 28 U/L (ref 15–37)
BACTERIA URNS QL MICRO: NEGATIVE /HPF
BASOPHILS # BLD: 0.07 K/UL (ref 0–0.1)
BASOPHILS NFR BLD: 0.6 % (ref 0–1)
BILIRUB SERPL-MCNC: 0.9 MG/DL (ref 0.2–1)
BILIRUB UR QL: NEGATIVE
BUN SERPL-MCNC: 48 MG/DL (ref 6–20)
BUN/CREAT SERPL: 26 (ref 12–20)
CALCIUM SERPL-MCNC: 9.9 MG/DL (ref 8.5–10.1)
CHLORIDE SERPL-SCNC: 109 MMOL/L (ref 97–108)
CO2 SERPL-SCNC: 27 MMOL/L (ref 21–32)
COLOR UR: ABNORMAL
COMMENT:: NORMAL
CREAT SERPL-MCNC: 1.83 MG/DL (ref 0.55–1.02)
DIFFERENTIAL METHOD BLD: ABNORMAL
EOSINOPHIL # BLD: 0.01 K/UL (ref 0–0.4)
EOSINOPHIL NFR BLD: 0.1 % (ref 0–7)
EPITH CASTS URNS QL MICRO: ABNORMAL /LPF
ERYTHROCYTE [DISTWIDTH] IN BLOOD BY AUTOMATED COUNT: 14.9 % (ref 11.5–14.5)
GLOBULIN SER CALC-MCNC: 4 G/DL (ref 2–4)
GLUCOSE BLD STRIP.AUTO-MCNC: 123 MG/DL (ref 65–117)
GLUCOSE SERPL-MCNC: 222 MG/DL (ref 65–100)
GLUCOSE UR STRIP.AUTO-MCNC: NEGATIVE MG/DL
HCT VFR BLD AUTO: 35 % (ref 35–47)
HGB BLD-MCNC: 11.1 G/DL (ref 11.5–16)
HGB UR QL STRIP: NEGATIVE
IMM GRANULOCYTES # BLD AUTO: 0.06 K/UL (ref 0–0.04)
IMM GRANULOCYTES NFR BLD AUTO: 0.5 % (ref 0–0.5)
KETONES UR QL STRIP.AUTO: NEGATIVE MG/DL
LEUKOCYTE ESTERASE UR QL STRIP.AUTO: NEGATIVE
LYMPHOCYTES # BLD: 1.83 K/UL (ref 0.8–3.5)
LYMPHOCYTES NFR BLD: 16.6 % (ref 12–49)
MCH RBC QN AUTO: 29.2 PG (ref 26–34)
MCHC RBC AUTO-ENTMCNC: 31.7 G/DL (ref 30–36.5)
MCV RBC AUTO: 92.1 FL (ref 80–99)
MONOCYTES # BLD: 0.86 K/UL (ref 0–1)
MONOCYTES NFR BLD: 7.8 % (ref 5–13)
NEUTS SEG # BLD: 8.22 K/UL (ref 1.8–8)
NEUTS SEG NFR BLD: 74.4 % (ref 32–75)
NITRITE UR QL STRIP.AUTO: NEGATIVE
NRBC # BLD: 0 K/UL (ref 0–0.01)
NRBC BLD-RTO: 0 PER 100 WBC
PH UR STRIP: 6 (ref 5–8)
PLATELET # BLD AUTO: 355 K/UL (ref 150–400)
PMV BLD AUTO: 10.2 FL (ref 8.9–12.9)
POTASSIUM SERPL-SCNC: 4.6 MMOL/L (ref 3.5–5.1)
PROT SERPL-MCNC: 7.1 G/DL (ref 6.4–8.2)
PROT UR STRIP-MCNC: ABNORMAL MG/DL
RBC # BLD AUTO: 3.8 M/UL (ref 3.8–5.2)
RBC #/AREA URNS HPF: ABNORMAL /HPF (ref 0–5)
SERVICE CMNT-IMP: ABNORMAL
SODIUM SERPL-SCNC: 143 MMOL/L (ref 136–145)
SP GR UR REFRACTOMETRY: 1.02 (ref 1–1.03)
SPECIMEN HOLD: NORMAL
TRI-PHOS CRY URNS QL MICRO: ABNORMAL
UROBILINOGEN UR QL STRIP.AUTO: 1 EU/DL (ref 0.2–1)
WBC # BLD AUTO: 11.1 K/UL (ref 3.6–11)
WBC URNS QL MICRO: ABNORMAL /HPF (ref 0–4)

## 2025-06-16 PROCEDURE — 1100000000 HC RM PRIVATE

## 2025-06-16 PROCEDURE — 81001 URINALYSIS AUTO W/SCOPE: CPT

## 2025-06-16 PROCEDURE — 74176 CT ABD & PELVIS W/O CONTRAST: CPT

## 2025-06-16 PROCEDURE — 85025 COMPLETE CBC W/AUTO DIFF WBC: CPT

## 2025-06-16 PROCEDURE — 82962 GLUCOSE BLOOD TEST: CPT

## 2025-06-16 PROCEDURE — 99285 EMERGENCY DEPT VISIT HI MDM: CPT

## 2025-06-16 PROCEDURE — 80053 COMPREHEN METABOLIC PANEL: CPT

## 2025-06-16 RX ORDER — IOPAMIDOL 755 MG/ML
100 INJECTION, SOLUTION INTRAVASCULAR
Status: DISCONTINUED | OUTPATIENT
Start: 2025-06-16 | End: 2025-06-16

## 2025-06-16 RX ORDER — DULAGLUTIDE 0.75 MG/.5ML
INJECTION, SOLUTION SUBCUTANEOUS
COMMUNITY
Start: 2025-06-13

## 2025-06-16 NOTE — SUBJECTIVE & OBJECTIVE
Subjective:     ***    Objective:     Vitals:    06/16/25 1115 06/16/25 1415   BP: 134/76 (!) 162/97   Pulse: 88 77   Resp: 20 20   Temp: 97.3 °F (36.3 °C) 97.3 °F (36.3 °C)   TempSrc:  Axillary   SpO2: 96% 97%        Intake andOutput:  Current Shift: 06/16 0701 - 06/16 1900  In: -   Out: 100 [Urine:100]  Last three shifts: No intake/output data recorded.     Physical Exam      Medications:  No current facility-administered medications for this encounter.     Current Outpatient Medications   Medication Sig    TRULICITY 0.75 MG/0.5ML SOAJ SC injection     LORazepam (ATIVAN) 0.5 MG tablet Take 1 tablet by mouth every 6 hours as needed for Anxiety. Max Daily Amount: 2 mg    midodrine (PROAMATINE) 2.5 MG tablet Take 1 tablet by mouth 3 times daily    QUEtiapine (SEROQUEL) 25 MG tablet Take 1 tablet by mouth 2 times daily    busPIRone (BUSPAR) 5 MG tablet Take 1 tablet by mouth 3 times daily    traZODone (DESYREL) 50 MG tablet Take 0.5 tablets by mouth every 6 hours as needed for Sleep (For anxiety and agitation or if wakes up in the middle of the night unable to go back to sleep)    OLANZapine zydis (ZYPREXA) 5 MG disintegrating tablet Take 0.5 tablets by mouth nightly for 3 days    metoprolol succinate (TOPROL XL) 25 MG extended release tablet Take 3 tablets by mouth daily    acetaminophen (TYLENOL) 325 MG tablet Take 2 tablets by mouth every 6 hours as needed    atorvastatin (LIPITOR) 80 MG tablet Take 1 tablet by mouth nightly (Patient not taking: Reported on 5/15/2025)    cloNIDine (CATAPRES) 0.1 MG tablet Take 1 tablet by mouth 2 times daily    clopidogrel (PLAVIX) 75 MG tablet Take 1 tablet by mouth daily    escitalopram (LEXAPRO) 5 MG tablet Take 1 tablet by mouth daily (Patient not taking: Reported on 5/15/2025)    famotidine (PEPCID) 20 MG tablet Take 1 tablet by mouth daily    heparin, porcine, 5000 UNIT/ML injection Inject 1 mL into the skin in the morning and 1 mL at noon and 1 mL in the evening.  (Patient not taking: Reported on 5/15/2025)    hydrALAZINE (APRESOLINE) 50 MG tablet Take 1 tablet by mouth 3 times daily (Patient not taking: Reported on 5/15/2025)    insulin glargine (LANTUS) 100 UNIT/ML injection vial Inject 15 Units into the skin daily    insulin lispro (HUMALOG) 100 UNIT/ML SOLN injection vial Sliding scale  2 units for 200-250;  4 units for 251-300;  6 units for glucose greater than 300    polyethylene glycol (GLYCOLAX) 17 GM/SCOOP powder Take 17 g by mouth daily    senna-docusate (PERICOLACE) 8.6-50 MG per tablet Take 1 tablet by mouth daily        Medications Reviewed    :

## 2025-06-16 NOTE — ED NOTES
Patient's caregiver left department. Bed alarm placed. Bed in lowest position. Bed rails up. Patient is placed in a visible room from nurse's station. Patient provided with water, ok'd by provider. Patient was able to swallow water with a straw.

## 2025-06-16 NOTE — ED TRIAGE NOTES
Patient arrives via EMS. Patient's caregiver reports patient started become altered from baseline on 6/12. Reports she would ask to eat when she would be sitting down to eat. Reports her speech has become slurred. Patient was moved from stretcher to stretcher. Caregiver denies fevers. Caregiver reports she does not know when the last BM was. EMS reported last BM was 2 wks ago.    No

## 2025-06-16 NOTE — ED PROVIDER NOTES
[facility]ERGENC[facility] DEPARTMENT  EMERGENCY DEPARTMENT ENCOUNTER      Pt Name: Karlee Leon  MRN: [id number]  Birthdate 1950  Date of evaluation: 6/16/2025  Provider: Tank Brown PA-C    CHIEF COMPLAINT       Chief Complaint   Patient presents with    Fatigue    Constipation         HISTORY OF PRESENT ILLNESS   (Location/Symptom, Timing/Onset, Context/Setting, Quality, Duration, Modifying Factors, Severity)  Note limiting factors.   This is a 75-year-old female with history of dementia, insulin-dependent diabetes, hypertension, ulcerative colitis, GERD presenting from [facility] due to inability to pass bowel movement x 2 months. She is accompanied by her nursing assistant who reports the patient has not passed a bowel movement for at least 2 months since the nursing assistant started at [facility].      The assistant reports that the patient normally will tell her birthday but usually will not say her name.  She normally has slurred speech.  However, she is not normally oriented to place or time.            Review of External Medical Records:     Nursing Notes were reviewed.    REVIEW OF SYSTEMS    (2-9 systems for level 4, 10 or more for level 5)     Review of Systems    Except as noted above the remainder of the review of systems was reviewed and negative.       PAST MEDICAL HISTORY     Past Medical History:   Diagnosis Date    Anxiety     Arthritis     Behavioral disturbance due to multi-infarct dementia (HCC)     Cerebral infarction (HCC)     Chronic pain     CKD (chronic kidney disease)     Depression     Diabetes (HCC)     Falls     GERD (gastroesophageal reflux disease)     Hypertension     Hypertensive heart disease     Ill-defined condition     ucerative colitis    Type 2 diabetes mellitus (HCC)     Ulcerative colitis (HCC)     Vertigo          SURGICAL HISTORY       Past Surgical History:   Procedure Laterality Date    GI      colonoscopy    ORTHOPEDIC SURGERY Right 2014    FOOT

## 2025-06-16 NOTE — ED NOTES
Patient assisted to bedside commode. Patient's brief changed. Patient found attempting to remove shirt. Patient changed into a gown. Patient provided with lonnie bear for comfort.

## 2025-06-16 NOTE — ED NOTES
TRANSFER - OUT REPORT:    Verbal report given to ALONDRA Alonso on Karlee Leon  being transferred to  for routine progression of patient care       Report consisted of patient's Situation, Background, Assessment and   Recommendations(SBAR).     Information from the following report(s) Nurse Handoff Report, ED Encounter Summary, ED SBAR, and MAR was reviewed with the receiving nurse.    Elma Fall Assessment:    Presents to emergency department  because of falls (Syncope, seizure, or loss of consciousness): No  Age > 70: Yes  Altered Mental Status, Intoxication with alcohol or substance confusion (Disorientation, impaired judgment, poor safety awaremess, or inability to follow instructions): Yes  Impaired Mobility: Ambulates or transfers with assistive devices or assistance; Unable to ambulate or transer.: Yes  Nursing Judgement: Yes          Lines:   Peripheral IV 06/16/25 Right Antecubital (Active)        Opportunity for questions and clarification was provided.

## 2025-06-16 NOTE — ED NOTES
Patient two person assist to bedside commode. Patient's brief changed. Bed pads changed. Patient two person assist back to stretcher. Provider at bedside to update caregiver.

## 2025-06-17 ENCOUNTER — APPOINTMENT (OUTPATIENT)
Facility: HOSPITAL | Age: 75
DRG: 760 | End: 2025-06-17
Payer: MEDICARE

## 2025-06-17 ENCOUNTER — APPOINTMENT (OUTPATIENT)
Facility: HOSPITAL | Age: 75
DRG: 760 | End: 2025-06-17
Attending: INTERNAL MEDICINE
Payer: MEDICARE

## 2025-06-17 LAB
ALBUMIN SERPL-MCNC: 3 G/DL (ref 3.5–5)
ALBUMIN/GLOB SERPL: 0.8 (ref 1.1–2.2)
ALP SERPL-CCNC: 126 U/L (ref 45–117)
ALT SERPL-CCNC: 17 U/L (ref 12–78)
ANION GAP SERPL CALC-SCNC: 5 MMOL/L (ref 2–12)
AST SERPL-CCNC: 22 U/L (ref 15–37)
BASOPHILS # BLD: 0.09 K/UL (ref 0–0.1)
BASOPHILS NFR BLD: 0.8 % (ref 0–1)
BILIRUB SERPL-MCNC: 1.4 MG/DL (ref 0.2–1)
BUN SERPL-MCNC: 39 MG/DL (ref 6–20)
BUN/CREAT SERPL: 26 (ref 12–20)
CALCIUM SERPL-MCNC: 10 MG/DL (ref 8.5–10.1)
CHLORIDE SERPL-SCNC: 117 MMOL/L (ref 97–108)
CO2 SERPL-SCNC: 22 MMOL/L (ref 21–32)
CREAT SERPL-MCNC: 1.51 MG/DL (ref 0.55–1.02)
CRP SERPL-MCNC: 4.03 MG/DL (ref 0–0.3)
DIFFERENTIAL METHOD BLD: ABNORMAL
EKG ATRIAL RATE: 77 BPM
EKG DIAGNOSIS: NORMAL
EKG P-R INTERVAL: 138 MS
EKG Q-T INTERVAL: 406 MS
EKG QRS DURATION: 88 MS
EKG QTC CALCULATION (BAZETT): 459 MS
EKG R AXIS: -13 DEGREES
EKG T AXIS: -9 DEGREES
EKG VENTRICULAR RATE: 77 BPM
EOSINOPHIL # BLD: 0 K/UL (ref 0–0.4)
EOSINOPHIL NFR BLD: 0 % (ref 0–7)
ERYTHROCYTE [DISTWIDTH] IN BLOOD BY AUTOMATED COUNT: 14.6 % (ref 11.5–14.5)
EST. AVERAGE GLUCOSE BLD GHB EST-MCNC: 166 MG/DL
FOLATE SERPL-MCNC: 4.6 NG/ML (ref 5–21)
GLOBULIN SER CALC-MCNC: 3.6 G/DL (ref 2–4)
GLUCOSE BLD STRIP.AUTO-MCNC: 117 MG/DL (ref 65–117)
GLUCOSE BLD STRIP.AUTO-MCNC: 133 MG/DL (ref 65–117)
GLUCOSE BLD STRIP.AUTO-MCNC: 162 MG/DL (ref 65–117)
GLUCOSE BLD STRIP.AUTO-MCNC: 202 MG/DL (ref 65–117)
GLUCOSE SERPL-MCNC: 94 MG/DL (ref 65–100)
HBA1C MFR BLD: 7.4 % (ref 4–5.6)
HCT VFR BLD AUTO: 34 % (ref 35–47)
HGB BLD-MCNC: 10.4 G/DL (ref 11.5–16)
IMM GRANULOCYTES # BLD AUTO: 0.05 K/UL (ref 0–0.04)
IMM GRANULOCYTES NFR BLD AUTO: 0.5 % (ref 0–0.5)
LIPASE SERPL-CCNC: 26 U/L (ref 13–75)
LYMPHOCYTES # BLD: 2.2 K/UL (ref 0.8–3.5)
LYMPHOCYTES NFR BLD: 20.3 % (ref 12–49)
MAGNESIUM SERPL-MCNC: 2.2 MG/DL (ref 1.6–2.4)
MCH RBC QN AUTO: 29.1 PG (ref 26–34)
MCHC RBC AUTO-ENTMCNC: 30.6 G/DL (ref 30–36.5)
MCV RBC AUTO: 95 FL (ref 80–99)
MONOCYTES # BLD: 0.93 K/UL (ref 0–1)
MONOCYTES NFR BLD: 8.6 % (ref 5–13)
NEUTS SEG # BLD: 7.59 K/UL (ref 1.8–8)
NEUTS SEG NFR BLD: 69.8 % (ref 32–75)
NRBC # BLD: 0 K/UL (ref 0–0.01)
NRBC BLD-RTO: 0 PER 100 WBC
NT PRO BNP: 1863 PG/ML
PHOSPHATE SERPL-MCNC: 3.1 MG/DL (ref 2.6–4.7)
PLATELET # BLD AUTO: 388 K/UL (ref 150–400)
PMV BLD AUTO: 10.1 FL (ref 8.9–12.9)
POTASSIUM SERPL-SCNC: 3.8 MMOL/L (ref 3.5–5.1)
PROT SERPL-MCNC: 6.6 G/DL (ref 6.4–8.2)
RBC # BLD AUTO: 3.58 M/UL (ref 3.8–5.2)
SERVICE CMNT-IMP: ABNORMAL
SERVICE CMNT-IMP: NORMAL
SODIUM SERPL-SCNC: 144 MMOL/L (ref 136–145)
TSH SERPL DL<=0.05 MIU/L-ACNC: 0.55 UIU/ML (ref 0.36–3.74)
VIT B12 SERPL-MCNC: 230 PG/ML (ref 193–986)
WBC # BLD AUTO: 10.9 K/UL (ref 3.6–11)

## 2025-06-17 PROCEDURE — 83036 HEMOGLOBIN GLYCOSYLATED A1C: CPT

## 2025-06-17 PROCEDURE — 97161 PT EVAL LOW COMPLEX 20 MIN: CPT

## 2025-06-17 PROCEDURE — 86140 C-REACTIVE PROTEIN: CPT

## 2025-06-17 PROCEDURE — 82607 VITAMIN B-12: CPT

## 2025-06-17 PROCEDURE — 6370000000 HC RX 637 (ALT 250 FOR IP): Performed by: INTERNAL MEDICINE

## 2025-06-17 PROCEDURE — 82962 GLUCOSE BLOOD TEST: CPT

## 2025-06-17 PROCEDURE — 82746 ASSAY OF FOLIC ACID SERUM: CPT

## 2025-06-17 PROCEDURE — 84443 ASSAY THYROID STIM HORMONE: CPT

## 2025-06-17 PROCEDURE — 51798 US URINE CAPACITY MEASURE: CPT

## 2025-06-17 PROCEDURE — 80053 COMPREHEN METABOLIC PANEL: CPT

## 2025-06-17 PROCEDURE — 97165 OT EVAL LOW COMPLEX 30 MIN: CPT

## 2025-06-17 PROCEDURE — 93970 EXTREMITY STUDY: CPT

## 2025-06-17 PROCEDURE — 2500000003 HC RX 250 WO HCPCS: Performed by: INTERNAL MEDICINE

## 2025-06-17 PROCEDURE — 84100 ASSAY OF PHOSPHORUS: CPT

## 2025-06-17 PROCEDURE — 85025 COMPLETE CBC W/AUTO DIFF WBC: CPT

## 2025-06-17 PROCEDURE — 83690 ASSAY OF LIPASE: CPT

## 2025-06-17 PROCEDURE — 83735 ASSAY OF MAGNESIUM: CPT

## 2025-06-17 PROCEDURE — 2580000003 HC RX 258: Performed by: INTERNAL MEDICINE

## 2025-06-17 PROCEDURE — 99222 1ST HOSP IP/OBS MODERATE 55: CPT | Performed by: OBSTETRICS & GYNECOLOGY

## 2025-06-17 PROCEDURE — 71250 CT THORAX DX C-: CPT

## 2025-06-17 PROCEDURE — 93005 ELECTROCARDIOGRAM TRACING: CPT | Performed by: INTERNAL MEDICINE

## 2025-06-17 PROCEDURE — 99221 1ST HOSP IP/OBS SF/LOW 40: CPT | Performed by: NURSE PRACTITIONER

## 2025-06-17 PROCEDURE — 83880 ASSAY OF NATRIURETIC PEPTIDE: CPT

## 2025-06-17 PROCEDURE — 1100000000 HC RM PRIVATE

## 2025-06-17 RX ORDER — SODIUM CHLORIDE 9 MG/ML
INJECTION, SOLUTION INTRAVENOUS PRN
Status: DISCONTINUED | OUTPATIENT
Start: 2025-06-17 | End: 2025-06-19 | Stop reason: HOSPADM

## 2025-06-17 RX ORDER — SODIUM CHLORIDE 0.9 % (FLUSH) 0.9 %
5-40 SYRINGE (ML) INJECTION EVERY 12 HOURS SCHEDULED
Status: DISCONTINUED | OUTPATIENT
Start: 2025-06-17 | End: 2025-06-19 | Stop reason: HOSPADM

## 2025-06-17 RX ORDER — HYDROMORPHONE HYDROCHLORIDE 1 MG/ML
1 INJECTION, SOLUTION INTRAMUSCULAR; INTRAVENOUS; SUBCUTANEOUS EVERY 4 HOURS PRN
Refills: 0 | Status: DISCONTINUED | OUTPATIENT
Start: 2025-06-17 | End: 2025-06-17

## 2025-06-17 RX ORDER — LACTULOSE 10 G/15ML
20 SOLUTION ORAL 2 TIMES DAILY
Status: DISCONTINUED | OUTPATIENT
Start: 2025-06-17 | End: 2025-06-19 | Stop reason: HOSPADM

## 2025-06-17 RX ORDER — ACETAMINOPHEN 325 MG/1
650 TABLET ORAL EVERY 6 HOURS PRN
Status: DISCONTINUED | OUTPATIENT
Start: 2025-06-17 | End: 2025-06-19 | Stop reason: HOSPADM

## 2025-06-17 RX ORDER — FAMOTIDINE 20 MG/1
20 TABLET, FILM COATED ORAL DAILY
Status: DISCONTINUED | OUTPATIENT
Start: 2025-06-17 | End: 2025-06-19 | Stop reason: HOSPADM

## 2025-06-17 RX ORDER — ACETAMINOPHEN 650 MG/1
650 SUPPOSITORY RECTAL EVERY 6 HOURS PRN
Status: DISCONTINUED | OUTPATIENT
Start: 2025-06-17 | End: 2025-06-19 | Stop reason: HOSPADM

## 2025-06-17 RX ORDER — ONDANSETRON 2 MG/ML
4 INJECTION INTRAMUSCULAR; INTRAVENOUS EVERY 6 HOURS PRN
Status: DISCONTINUED | OUTPATIENT
Start: 2025-06-17 | End: 2025-06-19 | Stop reason: HOSPADM

## 2025-06-17 RX ORDER — ATORVASTATIN CALCIUM 40 MG/1
80 TABLET, FILM COATED ORAL NIGHTLY
Status: DISCONTINUED | OUTPATIENT
Start: 2025-06-17 | End: 2025-06-19 | Stop reason: HOSPADM

## 2025-06-17 RX ORDER — POTASSIUM CHLORIDE 750 MG/1
40 TABLET, EXTENDED RELEASE ORAL PRN
Status: DISCONTINUED | OUTPATIENT
Start: 2025-06-17 | End: 2025-06-19 | Stop reason: HOSPADM

## 2025-06-17 RX ORDER — ESCITALOPRAM OXALATE 10 MG/1
5 TABLET ORAL DAILY
Status: DISCONTINUED | OUTPATIENT
Start: 2025-06-17 | End: 2025-06-17

## 2025-06-17 RX ORDER — BUSPIRONE HYDROCHLORIDE 5 MG/1
5 TABLET ORAL 3 TIMES DAILY
Status: DISCONTINUED | OUTPATIENT
Start: 2025-06-17 | End: 2025-06-19 | Stop reason: HOSPADM

## 2025-06-17 RX ORDER — POTASSIUM CHLORIDE 7.45 MG/ML
10 INJECTION INTRAVENOUS PRN
Status: DISCONTINUED | OUTPATIENT
Start: 2025-06-17 | End: 2025-06-19 | Stop reason: HOSPADM

## 2025-06-17 RX ORDER — LORAZEPAM 1 MG/1
0.5 TABLET ORAL EVERY 6 HOURS PRN
Status: DISCONTINUED | OUTPATIENT
Start: 2025-06-17 | End: 2025-06-19 | Stop reason: HOSPADM

## 2025-06-17 RX ORDER — POLYETHYLENE GLYCOL 3350 17 G/17G
17 POWDER, FOR SOLUTION ORAL DAILY PRN
Status: DISCONTINUED | OUTPATIENT
Start: 2025-06-17 | End: 2025-06-19 | Stop reason: HOSPADM

## 2025-06-17 RX ORDER — SODIUM CHLORIDE 9 MG/ML
INJECTION, SOLUTION INTRAVENOUS CONTINUOUS
Status: DISCONTINUED | OUTPATIENT
Start: 2025-06-17 | End: 2025-06-18

## 2025-06-17 RX ORDER — OXYCODONE HYDROCHLORIDE 5 MG/1
5 TABLET ORAL EVERY 4 HOURS PRN
Refills: 0 | Status: DISCONTINUED | OUTPATIENT
Start: 2025-06-17 | End: 2025-06-17

## 2025-06-17 RX ORDER — QUETIAPINE FUMARATE 25 MG/1
25 TABLET, FILM COATED ORAL 2 TIMES DAILY
Status: DISCONTINUED | OUTPATIENT
Start: 2025-06-17 | End: 2025-06-19 | Stop reason: HOSPADM

## 2025-06-17 RX ORDER — INSULIN LISPRO 100 [IU]/ML
0-8 INJECTION, SOLUTION INTRAVENOUS; SUBCUTANEOUS
Status: DISCONTINUED | OUTPATIENT
Start: 2025-06-17 | End: 2025-06-19 | Stop reason: HOSPADM

## 2025-06-17 RX ORDER — DEXTROSE MONOHYDRATE 100 MG/ML
INJECTION, SOLUTION INTRAVENOUS CONTINUOUS PRN
Status: DISCONTINUED | OUTPATIENT
Start: 2025-06-17 | End: 2025-06-19 | Stop reason: HOSPADM

## 2025-06-17 RX ORDER — OXYCODONE HYDROCHLORIDE 5 MG/1
5 TABLET ORAL EVERY 6 HOURS PRN
Refills: 0 | Status: DISCONTINUED | OUTPATIENT
Start: 2025-06-17 | End: 2025-06-19 | Stop reason: HOSPADM

## 2025-06-17 RX ORDER — MAGNESIUM SULFATE IN WATER 40 MG/ML
2000 INJECTION, SOLUTION INTRAVENOUS PRN
Status: DISCONTINUED | OUTPATIENT
Start: 2025-06-17 | End: 2025-06-19 | Stop reason: HOSPADM

## 2025-06-17 RX ORDER — SODIUM CHLORIDE 0.9 % (FLUSH) 0.9 %
5-40 SYRINGE (ML) INJECTION PRN
Status: DISCONTINUED | OUTPATIENT
Start: 2025-06-17 | End: 2025-06-19 | Stop reason: HOSPADM

## 2025-06-17 RX ORDER — HYDRALAZINE HYDROCHLORIDE 25 MG/1
50 TABLET, FILM COATED ORAL 3 TIMES DAILY
Status: DISCONTINUED | OUTPATIENT
Start: 2025-06-17 | End: 2025-06-17

## 2025-06-17 RX ORDER — ONDANSETRON 4 MG/1
4 TABLET, ORALLY DISINTEGRATING ORAL EVERY 8 HOURS PRN
Status: DISCONTINUED | OUTPATIENT
Start: 2025-06-17 | End: 2025-06-19 | Stop reason: HOSPADM

## 2025-06-17 RX ADMIN — ACETAMINOPHEN 650 MG: 325 TABLET ORAL at 17:47

## 2025-06-17 RX ADMIN — QUETIAPINE FUMARATE 25 MG: 25 TABLET ORAL at 09:10

## 2025-06-17 RX ADMIN — SODIUM CHLORIDE, PRESERVATIVE FREE 10 ML: 5 INJECTION INTRAVENOUS at 20:55

## 2025-06-17 RX ADMIN — BUSPIRONE HYDROCHLORIDE 5 MG: 5 TABLET ORAL at 20:54

## 2025-06-17 RX ADMIN — BUSPIRONE HYDROCHLORIDE 5 MG: 5 TABLET ORAL at 14:58

## 2025-06-17 RX ADMIN — SODIUM CHLORIDE, PRESERVATIVE FREE 10 ML: 5 INJECTION INTRAVENOUS at 09:16

## 2025-06-17 RX ADMIN — FAMOTIDINE 20 MG: 20 TABLET, FILM COATED ORAL at 09:10

## 2025-06-17 RX ADMIN — LORAZEPAM 0.5 MG: 1 TABLET ORAL at 18:54

## 2025-06-17 RX ADMIN — BUSPIRONE HYDROCHLORIDE 5 MG: 5 TABLET ORAL at 09:10

## 2025-06-17 RX ADMIN — SODIUM CHLORIDE: 0.9 INJECTION, SOLUTION INTRAVENOUS at 05:12

## 2025-06-17 RX ADMIN — ATORVASTATIN CALCIUM 80 MG: 40 TABLET, FILM COATED ORAL at 20:54

## 2025-06-17 RX ADMIN — INSULIN LISPRO 2 UNITS: 100 INJECTION, SOLUTION INTRAVENOUS; SUBCUTANEOUS at 12:11

## 2025-06-17 RX ADMIN — SODIUM CHLORIDE: 0.9 INJECTION, SOLUTION INTRAVENOUS at 18:58

## 2025-06-17 RX ADMIN — LACTULOSE 20 G: 20 SOLUTION ORAL at 09:10

## 2025-06-17 RX ADMIN — QUETIAPINE FUMARATE 25 MG: 25 TABLET ORAL at 20:54

## 2025-06-17 RX ADMIN — LACTULOSE 20 G: 20 SOLUTION ORAL at 20:54

## 2025-06-17 NOTE — PLAN OF CARE
Problem: Physical Therapy - Adult  Goal: By Discharge: Performs mobility at highest level of function for planned discharge setting.  See evaluation for individualized goals.  Description: FUNCTIONAL STATUS PRIOR TO ADMISSION: From Memory Care. Per previous admission 12/2024, pt WC bound    HOME SUPPORT PRIOR TO ADMISSION: Required assistance from staff for ADLs    Physical Therapy Goals  Initiated 6/17/2025  1.  Patient will move from supine to sit and sit to supine in bed with minimal assistance within 7 day(s).    2.  Patient will perform sit to stand with minimal assistance within 7 day(s).  3.  Patient will transfer from bed to chair and chair to bed with minimal assistance using the least restrictive device within 7 day(s).  4.  Patient will ambulate with minimal assistance for 10 feet with the least restrictive device within 7 day(s).   Patient has no stairs in home thus no stair goal.       Outcome: Progressing       PHYSICAL THERAPY EVALUATION    Patient: Karlee Leon (75 y.o. female)  Date: 6/17/2025  Primary Diagnosis: Pelvic mass [R19.00]  Constipation, unspecified constipation type [K59.00]       Precautions: Restrictions/Precautions  Restrictions/Precautions: Fall Risk            ASSESSMENT :   DEFICITS/IMPAIRMENTS:   The patient is limited by decreased functional mobility, independence in ADLs, activity tolerance, safety awareness, cognition, command following, attention/concentration, balance.  Pt with pmh of dementia, insulin-dependent diabetes, hypertension, ulcerative colitis, GERD presenting from [facility] due to inability to pass bowel movement x 2 months.      Pt generally mobilized at a Max A level during today's session. Pt received semi arrieta in bed, on RA, sitter present, and agreeable to work with therapy. Pt performed bed mobility, transfers, balance assessment this session.  Pt with inconsistent command following and largely non verbal during session tending to answer questions

## 2025-06-17 NOTE — CONSULTS
[facility]   Inpatient Consultation        Admit Date: 6/16/2025  Reason for Consultation: R buttock contusion, hematoma  Referring Provider:    HPI:  Karlee Leon is a 75 y.o. female w/ hx of dementia and as below presented to the ED due to no BM for 2 months.  She had a CT which showed a contusion and hematoma; see below.  Pt unable to give any information.         CT yesterday:   Pelvis: There is a large volume of stool throughout the colon, excepting the mid  and distal sigmoid (and rectum). There is pelvic floor relaxation, with a  moderate rectocele. A homogenously hypodense pelvic mass measures 113 x 175 x 94  mm, ~930 cc. A proteinaceous cystic ovarian neoplasm or a uterus enlarged and  replaced with numerous fibroids are possible. The bladder is displaced  inferiorly, and a lobular bladder extending into a small right inguinal hernia  (601-53). Though left hip replacement causes scatter artifact over the pelvis,  there is contusion of the right gluteus and overlying subcutaneous fat, as well  as multiple small hematomas in the gluteus muscles. The unenhanced small bowel  and appendix are grossly normal. No free air or fluid, and no abdominopelvic  lymphadenopathy.     IMPRESSION:  1. Large pelvic mass. Differential includes fibroid replaced uterus, endometrial  carcinoma, proteinaceous cystic ovarian neoplasm, and ovarian carcinoma. Given  GFR <30 and scatter artifact from left hip replacement, MRI pelvis with and  without contrast is recommended for further evaluation.  2. Constipation.  3. Right gluteus and subcutaneous contusion and intramuscular hematoma.       Patient Active Problem List    Diagnosis Date Noted    Pelvic mass 06/16/2025    Chronic heart failure with preserved ejection fraction (HCC) 01/03/2025    Moderate episode of recurrent major depressive disorder (HCC) 01/03/2025    Uncontrolled type 2 diabetes mellitus with hyperglycemia (HCC) 01/03/2025

## 2025-06-17 NOTE — CONSULTS
[address], [facility] [place] [address]  Phone: [phone]   [phone]     [email or web address]    NEPHROLOGY CONSULT NOTE     Patient: Kalree Leon MRN: [id number]  PCP: Terrence Trejo MD   :     1950  Age:   75 y.o.  Sex:  female      Referring physician: Baudilio Langley MD  Reason for consultation: 75 y.o. female with BALA on CKD   Admission Date: 2025 11:11 AM  LOS: 1 day      ASSESSMENT and PLAN :   BALA on CKD  - pre renal/ dehydration   - No obstruction from pelvic mass on CT  - Cr back to baseline   - continue IVF     Ckd 3B   - Baseline Cr 1.5 mg/dl     Large Pelvic mass    Constipation   HTN   Dementia   DM-II   UC  GERD       Care Plan discussed with:  PT         Thank you for consulting [facility] in the care of your patient.      Subjective:   HPI: Karlee Leon is a 75 y.o.  female who has been admitted to the hospital for constipation for 2-3 days. She is confused and unable to give any hx   Hx from records   On admission, had CT that showed large Pelvic mass  Labs showed CR up from baseline of 1.5 to 1.8 mg/d and we were asked to see her   UA was unremarkable   CT : No hydronephrosis   With IVF, Cr back to baseline     Past Medical Hx:   Past Medical History:   Diagnosis Date    Anxiety     Arthritis     Behavioral disturbance due to multi-infarct dementia (HCC)     Cerebral infarction (HCC)     Chronic pain     CKD (chronic kidney disease)     Depression     Diabetes (HCC)     Falls     GERD (gastroesophageal reflux disease)     Hypertension     Hypertensive heart disease     Ill-defined condition     ucerative colitis    Type 2 diabetes mellitus (HCC)     Ulcerative colitis (HCC)     Vertigo         Past Surgical Hx:     Past Surgical History:   Procedure Laterality Date    GI      colonoscopy    ORTHOPEDIC SURGERY Right 2014    FOOT    OTHER SURGICAL HISTORY      finger surgery,    POCT Glucose    Collection Time: 06/17/25  8:29 AM   Result Value Ref Range    POC Glucose 117 65 - 117 mg/dL    Performed by: VALENTINO Wright          Urine dipstick:   No results found for: \"COLOR\", \"CASTS\"    I have reviewed the following:   All pertinent labs, microbiology data, radiology imaging for my assessment     Medications list Personally Reviewed   [x]      Yes     []               No       Medications:  Prior to Admission medications    Medication Sig Start Date End Date Taking? Authorizing Provider   TRULICITY 0.75 MG/0.5ML SOAJ SC injection  6/13/25  Yes Courtney Lora MD   LORazepam (ATIVAN) 0.5 MG tablet Take 1 tablet by mouth every 6 hours as needed for Anxiety. Max Daily Amount: 2 mg    Courtney Lora MD   midodrine (PROAMATINE) 2.5 MG tablet Take 1 tablet by mouth 3 times daily    Courtney Lora MD   QUEtiapine (SEROQUEL) 25 MG tablet Take 1 tablet by mouth 2 times daily    Courtney Lora MD   busPIRone (BUSPAR) 5 MG tablet Take 1 tablet by mouth 3 times daily    Courtney Lora MD   traZODone (DESYREL) 50 MG tablet Take 0.5 tablets by mouth every 6 hours as needed for Sleep (For anxiety and agitation or if wakes up in the middle of the night unable to go back to sleep) 12/13/24 12/16/24  Jackelyn Bullard MD   OLANZapine zydis (ZYPREXA) 5 MG disintegrating tablet Take 0.5 tablets by mouth nightly for 3 days 12/13/24 12/16/24  Jackelyn Bullard MD   metoprolol succinate (TOPROL XL) 25 MG extended release tablet Take 3 tablets by mouth daily 12/13/24 1/12/25  Jackelyn Bullard MD   acetaminophen (TYLENOL) 325 MG tablet Take 2 tablets by mouth every 6 hours as needed 3/16/19   Automatic Reconciliation, Ar   atorvastatin (LIPITOR) 80 MG tablet Take 1 tablet by mouth nightly  Patient not taking: Reported on 5/15/2025 3/4/22   Automatic Reconciliation, Ar   cloNIDine (CATAPRES) 0.1 MG tablet Take 1 tablet by mouth 2 times daily    Automatic Reconciliation, Ar  Dressing: bandage

## 2025-06-17 NOTE — PLAN OF CARE
Problem: Occupational Therapy - Adult  Goal: By Discharge: Performs self-care activities at highest level of function for planned discharge setting.  See evaluation for individualized goals.  Description: Occupational Therapy Goals  Initiated: 6/17/2025   1.  Patient will perform grooming with mod A within 7 day(s).  2.  Patient will perform upper body bathing with max A within 7 day(s).  3.  Patient will perform upper body dressing with max A within 7 days.  4.  Patient will perform lower body dressing with maxA within 7 day(s).  5.  Patient will perform toilet transfers with mod A x 2 within 7 day(s).  6.  Patient will perform all aspects of toileting with mod A x2  within 7 day(s).      FUNCTIONAL STATUS PRIOR TO ADMISSION:     , Prior Level of Assist for ADLs: Needs assistance,  ,  ,  ,  , Toileting: Needs assistance,  ,  , Prior Level of Assist for Transfers: Needs assistance, Active : No     HOME SUPPORT: Unclear PLOF.  Pt from [facility]. No family to provide insight.     Outcome: Progressing  OCCUPATIONAL THERAPY EVALUATION    Patient: Karlee Leon (75 y.o. female)  Date: 6/17/2025  Primary Diagnosis: Pelvic mass [R19.00]  Constipation, unspecified constipation type [K59.00]         Precautions: Fall Risk                  ASSESSMENT :  The patient is limited by decreased functional mobility, independence in ADLs, high-level IADLs, ROM, strength, sensation, activity tolerance, safety awareness, cognition, command following, attention/concentration, coordination, balance.  Pt admitted due to inability to have a bowel movement for 2 months.  CT scan on admission reflecting pelvic mass.  Further workup recommended.  She resides in memory care at baseline but unclear how functional she is with self care and/or mobility skills.  No family present to provide insight this date.      Today, she is received sitting up in bed with her bear in her arms.  She agrees to grooming activities but

## 2025-06-17 NOTE — CARE COORDINATION
CM received phone call from Patt And, [facility] (c: [phone]). Per Patt, she cares for patient Mon - Fri. 7AM - 3:30PM.     Patient has [facility] personal care from 4PM - 8PM Mon - Fri and also on Sat & Sun for 4 hrs. (hrs vary, but usually 4 hrs)    Per Patt, patient's ex- recently moved to [place]; but is still very much so involved with patients care. Plan is for patient to move to [place], [place] with her sister pending arrangements. Patt reports that prior to move to [place] (which may be delayed due to hospitalization), patient will return to [facility] Care until stable for move.    MAXX Lawson   [phone]

## 2025-06-17 NOTE — CONSULTS
[facility] GYNECOLOGIC ONCOLOGY  [address], [address]  [place] [address]  P [phone]  F [phone]       Karlee Leon       [id number]  1950    Admitted 2025 Date 2025     Admit dx: Pelvic mass [R19.00]  Constipation, unspecified constipation type [K59.00]      HPI:    Karlee Leon is a 75 y.o.  female with a history of  type 2 diabetes, CKD, dementia, hypertension, ulcerative colitis, dyslipidemia, congestive heart failure with preserved ejection fraction presented at [facility] emergency room at [facility] with constipation and fatigue. It was reported that the patient symptoms have been going on for about 2 months.  Patient is unable to provide history because of her underlying dementia, history obtained from chart review.  It was also reported that the patient is more confused than usual.  There was no history of a fever, rigors or chills reported.  Also no history of a fall was reported.  Patient was last admitted to this hospital in 2024, patient was admitted for evaluation and treatment of change in mental status attributed to metabolic events.  CT scan of the abdomen and pelvis done in the emergency room without contrast shows large pelvic mass with broad differentials including ovarian carcinoma, right gluteus hematoma was also reported. Gyn onc consulted for evaluation of large pelvic mass.    SUBJECTIVE:    Karlee Leon is being seen for large pelvic mass seen on CT. Mass measures about [id number] cm on imaging. Pt has also had constipation for the past 2 months which brought her to the ED. No family or caregivers present on exam.    OB/GYN ROS: unable to obtain due to patient mental status  OB/GYN history: unable to obtain    Patient Active Problem List   Diagnosis    BALA (acute kidney injury)    CVA (cerebral vascular accident) (HCC)    Spinal instabilities of lumbar region    Dizziness    Acute encephalopathy    Altered  mental status    Troponin I above reference range    Primary hypertension    Severe malnutrition    Chronic heart failure with preserved ejection fraction (HCC)    Moderate episode of recurrent major depressive disorder (HCC)    Uncontrolled type 2 diabetes mellitus with hyperglycemia (HCC)    Stage 3a chronic kidney disease (HCC)    Pelvic mass       Past Medical History:   Diagnosis Date    Anxiety     Arthritis     Behavioral disturbance due to multi-infarct dementia (HCC)     Cerebral infarction (HCC)     Chronic pain     CKD (chronic kidney disease)     Depression     Diabetes (HCC)     Falls     GERD (gastroesophageal reflux disease)     Hypertension     Hypertensive heart disease     Ill-defined condition     ucerative colitis    Type 2 diabetes mellitus (HCC)     Ulcerative colitis (HCC)     Vertigo       Past Surgical History:   Procedure Laterality Date    GI      colonoscopy    ORTHOPEDIC SURGERY Right 2014    FOOT    OTHER SURGICAL HISTORY      finger surgery, foot surgery      Social History     Tobacco Use    Smoking status: Never     Passive exposure: Never    Smokeless tobacco: Never   Substance Use Topics    Alcohol use: No      Family History   Problem Relation Age of Onset    Hearing Impairment Brother     Heart Disease Mother     Diabetes Father     Diabetes Mother       Current Facility-Administered Medications   Medication Dose Route Frequency    atorvastatin (LIPITOR) tablet 80 mg  80 mg Oral Nightly    busPIRone (BUSPAR) tablet 5 mg  5 mg Oral TID    famotidine (PEPCID) tablet 20 mg  20 mg Oral Daily    LORazepam (ATIVAN) tablet 0.5 mg  0.5 mg Oral Q6H PRN    QUEtiapine (SEROQUEL) tablet 25 mg  25 mg Oral BID    sodium chloride flush 0.9 % injection 5-40 mL  5-40 mL IntraVENous 2 times per day    sodium chloride flush 0.9 % injection 5-40 mL  5-40 mL IntraVENous PRN    0.9 % sodium chloride infusion   IntraVENous PRN    potassium chloride (KLOR-CON) extended release tablet 40 mEq  40 mEq Oral

## 2025-06-17 NOTE — CARE COORDINATION
Care Management Initial Assessment       RUR: 19%  Readmission? No  1st IM letter given? No  1st  letter given: No     06/17/25 0919   Service Assessment   Patient Orientation Person   Cognition Dementia / Early Alzheimer's   History Provided By Other (see comment)  (Ex / MPOA)   Primary Caregiver Other (Comment)  ([facility])   Support Systems Family Members   Patient's Healthcare Decision Maker is: Named in Scanned ACP Document   PCP Verified by CM Yes   Last Visit to PCP Within last 3 months   Prior Functional Level Assistance with the following:;Mobility;Feeding;Toileting;Dressing;Bathing   Current Functional Level Assistance with the following:;Bathing;Dressing;Toileting;Feeding;Mobility   Can patient return to prior living arrangement Unknown at present   Ability to make needs known: Poor   Family able to assist with home care needs: No   Would you like for me to discuss the discharge plan with any other family members/significant others, and if so, who? Yes  (, MPOA)   Financial Resources Medicare   Social/Functional History   Lives With Other (Comment)  ([facility])   Type of Home Assisted living  ([facility])         CM completed chart review, and spoke with her MPOA/Ex , Fabien Leon via telephone. His telephone number is [phone]. He confirmed she lives in memory care at [facility], and has gone to [facility] in the past. He said patient is planing to go live  with her sister in [place], [place] on 6/25. He also gave this date to the [facility] as her move out date. Patient requires assistance with ADLs and IADls. He understands pt/ot will see her and make recommendations. Cm to follow and contact [facility].    [facility]  Care Management Department  Ph:[phone]

## 2025-06-17 NOTE — H&P
History and Physical    Date of Service:  6/17/2025  Primary Care Provider: Terrence Trejo MD  Source of information: Chart review    Chief Complaint: Fatigue and Constipation      History of Presenting Illness:   Karlee Leon is a 75 y.o. female with past medical history significant for type 2 diabetes, CKD, dementia, hypertension, ulcerative colitis, dyslipidemia, congestive heart failure with preserved ejection fraction [facility] with constipation and fatigue.  It was reported that the patient symptoms have been going on for about 2 months.  Patient is unable to provide history because of her underlying dementia.  It was also reported that the patient is more confused than usual.  There was no history of a fever, rigors or chills reported.  Also no history of a fall was reported.  Patient was last admitted to this hospital in December 2024, patient was admitted for evaluation and treatment of change in mental status attributed to metabolic events.  CT scan of the abdomen and pelvis done in the emergency room without contrast shows large pelvic mass with broad differentials including ovarian carcinoma, right gluteus hematoma was also reported.          REVIEW OF SYSTEMS:  Review of systems not obtained due to patient factors.     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  Will obtain MRI of the pelvis with and without contrast as advised by the radiologist.    2.  Right gluteus hematoma POA: General Surgery consult will be requested to assist in further evaluation and treatment.    3.  Type 2 diabetes with hyperglycemia POA: Will place the patient on sliding scale with insulin coverage.  Will check A1c level.    4.  Acute on chronic kidney disease stage IV POA: IV fluid and monitor the patient renal function.  Nephrology consult will also be requested to assist in further evaluation and treatment.    5.  Dementia with behavioral disturbance POA: Will continue Seroquel and BuSpar for the associated behavioral disturbance and supportive therapy.    6.  Essential hypertension POA: Will monitor the patient blood pressure closely.  Will resume preadmission medications once list of medication is obtained and verified by the pharmacy service.    7.  Dyslipidemia POA: Will continue Lipitor.    8.  Bilateral leg swelling POA: Will check ultrasound of the lower extremity for DVT.  Chest x-ray for vascular congestion.    9.  Constipation POA: Will start the patient on lactulose.            DIET: ADULT DIET; Regular; 3 carb choices (45 gm/meal)   ISOLATION PRECAUTIONS: No active isolations  CODE STATUS: DNR   Central Line:   None  DVT PROPHYLAXIS: Lovenox  FUNCTIONAL STATUS PRIOR TO HOSPITALIZATION: Ambulatory and capable of self-care but relies on assistive devices (rolling walker/cane).   Ambulatory status/function: Ambulates with assistance:  Walker   EARLY MOBILITY ASSESSMENT: Recommend an assessment from physical therapy and/or occupational therapy  ANTICIPATED DISCHARGE: Greater than 48 hours.  ANTICIPATED DISPOSITION: Home with Home Healthcare  EMERGENCY CONTACT/SURROGATE DECISION MAKER: Fabien Leon    CRITICAL CARE WAS PERFORMED FOR THIS ENCOUNTER: NO.      Signed By: Markel Bashir MD     June 17, 2025         Please note that this dictation may have been

## 2025-06-18 ENCOUNTER — APPOINTMENT (OUTPATIENT)
Facility: HOSPITAL | Age: 75
DRG: 760 | End: 2025-06-18
Payer: MEDICARE

## 2025-06-18 LAB
GLUCOSE BLD STRIP.AUTO-MCNC: 150 MG/DL (ref 65–117)
GLUCOSE BLD STRIP.AUTO-MCNC: 161 MG/DL (ref 65–117)
GLUCOSE BLD STRIP.AUTO-MCNC: 168 MG/DL (ref 65–117)
GLUCOSE BLD STRIP.AUTO-MCNC: 209 MG/DL (ref 65–117)
SERVICE CMNT-IMP: ABNORMAL

## 2025-06-18 PROCEDURE — 6360000004 HC RX CONTRAST MEDICATION: Performed by: INTERNAL MEDICINE

## 2025-06-18 PROCEDURE — 6370000000 HC RX 637 (ALT 250 FOR IP): Performed by: HOSPITALIST

## 2025-06-18 PROCEDURE — 82962 GLUCOSE BLOOD TEST: CPT

## 2025-06-18 PROCEDURE — 2500000003 HC RX 250 WO HCPCS: Performed by: INTERNAL MEDICINE

## 2025-06-18 PROCEDURE — 6370000000 HC RX 637 (ALT 250 FOR IP): Performed by: INTERNAL MEDICINE

## 2025-06-18 PROCEDURE — 1100000000 HC RM PRIVATE

## 2025-06-18 PROCEDURE — A9579 GAD-BASE MR CONTRAST NOS,1ML: HCPCS | Performed by: INTERNAL MEDICINE

## 2025-06-18 PROCEDURE — 2580000003 HC RX 258: Performed by: INTERNAL MEDICINE

## 2025-06-18 PROCEDURE — 6360000002 HC RX W HCPCS: Performed by: HOSPITALIST

## 2025-06-18 PROCEDURE — 2709999900 MRI PELVIS W WO CONTRAST

## 2025-06-18 RX ORDER — SENNA AND DOCUSATE SODIUM 50; 8.6 MG/1; MG/1
1 TABLET, FILM COATED ORAL DAILY
Status: DISCONTINUED | OUTPATIENT
Start: 2025-06-18 | End: 2025-06-19 | Stop reason: HOSPADM

## 2025-06-18 RX ORDER — HYDRALAZINE HYDROCHLORIDE 20 MG/ML
10 INJECTION INTRAMUSCULAR; INTRAVENOUS EVERY 6 HOURS PRN
Status: DISCONTINUED | OUTPATIENT
Start: 2025-06-18 | End: 2025-06-19 | Stop reason: HOSPADM

## 2025-06-18 RX ORDER — DIAZEPAM 10 MG/2ML
2.5 INJECTION, SOLUTION INTRAMUSCULAR; INTRAVENOUS PRN
Status: DISCONTINUED | OUTPATIENT
Start: 2025-06-18 | End: 2025-06-19 | Stop reason: HOSPADM

## 2025-06-18 RX ORDER — BENZONATATE 100 MG/1
100 CAPSULE ORAL 3 TIMES DAILY PRN
Status: DISCONTINUED | OUTPATIENT
Start: 2025-06-18 | End: 2025-06-19 | Stop reason: HOSPADM

## 2025-06-18 RX ORDER — CLONIDINE HYDROCHLORIDE 0.1 MG/1
0.1 TABLET ORAL 2 TIMES DAILY
Status: DISCONTINUED | OUTPATIENT
Start: 2025-06-18 | End: 2025-06-19 | Stop reason: HOSPADM

## 2025-06-18 RX ORDER — TRAZODONE HYDROCHLORIDE 50 MG/1
25 TABLET ORAL EVERY 6 HOURS PRN
Status: DISCONTINUED | OUTPATIENT
Start: 2025-06-18 | End: 2025-06-19 | Stop reason: HOSPADM

## 2025-06-18 RX ORDER — SODIUM CHLORIDE 0.9 % (FLUSH) 0.9 %
5-40 SYRINGE (ML) INJECTION 2 TIMES DAILY
Status: DISCONTINUED | OUTPATIENT
Start: 2025-06-18 | End: 2025-06-19 | Stop reason: HOSPADM

## 2025-06-18 RX ORDER — CLOPIDOGREL BISULFATE 75 MG/1
75 TABLET ORAL DAILY
Status: DISCONTINUED | OUTPATIENT
Start: 2025-06-18 | End: 2025-06-19 | Stop reason: HOSPADM

## 2025-06-18 RX ORDER — POLYETHYLENE GLYCOL 3350 17 G/17G
17 POWDER, FOR SOLUTION ORAL DAILY
Status: DISCONTINUED | OUTPATIENT
Start: 2025-06-18 | End: 2025-06-19 | Stop reason: HOSPADM

## 2025-06-18 RX ORDER — GADOTERIDOL 279.3 MG/ML
12 INJECTION INTRAVENOUS
Status: COMPLETED | OUTPATIENT
Start: 2025-06-18 | End: 2025-06-18

## 2025-06-18 RX ORDER — METOPROLOL SUCCINATE 25 MG/1
75 TABLET, EXTENDED RELEASE ORAL DAILY
Status: DISCONTINUED | OUTPATIENT
Start: 2025-06-18 | End: 2025-06-18

## 2025-06-18 RX ORDER — METOPROLOL SUCCINATE 25 MG/1
75 TABLET, EXTENDED RELEASE ORAL DAILY
Status: DISCONTINUED | OUTPATIENT
Start: 2025-06-18 | End: 2025-06-19 | Stop reason: HOSPADM

## 2025-06-18 RX ORDER — 0.9 % SODIUM CHLORIDE 0.9 %
500 INTRAVENOUS SOLUTION INTRAVENOUS ONCE
OUTPATIENT
Start: 2025-06-18 | End: 2025-06-18

## 2025-06-18 RX ADMIN — SENNOSIDES AND DOCUSATE SODIUM 1 TABLET: 8.6; 5 TABLET ORAL at 19:02

## 2025-06-18 RX ADMIN — FAMOTIDINE 20 MG: 20 TABLET, FILM COATED ORAL at 10:06

## 2025-06-18 RX ADMIN — SODIUM CHLORIDE, PRESERVATIVE FREE 10 ML: 5 INJECTION INTRAVENOUS at 20:34

## 2025-06-18 RX ADMIN — ATORVASTATIN CALCIUM 80 MG: 40 TABLET, FILM COATED ORAL at 20:33

## 2025-06-18 RX ADMIN — INSULIN LISPRO 2 UNITS: 100 INJECTION, SOLUTION INTRAVENOUS; SUBCUTANEOUS at 20:33

## 2025-06-18 RX ADMIN — SODIUM CHLORIDE: 0.9 INJECTION, SOLUTION INTRAVENOUS at 08:16

## 2025-06-18 RX ADMIN — BUSPIRONE HYDROCHLORIDE 5 MG: 5 TABLET ORAL at 20:33

## 2025-06-18 RX ADMIN — LORAZEPAM 0.5 MG: 1 TABLET ORAL at 18:13

## 2025-06-18 RX ADMIN — LACTULOSE 20 G: 20 SOLUTION ORAL at 10:06

## 2025-06-18 RX ADMIN — METOPROLOL SUCCINATE 75 MG: 25 TABLET, EXTENDED RELEASE ORAL at 19:02

## 2025-06-18 RX ADMIN — CLONIDINE HYDROCHLORIDE 0.1 MG: 0.1 TABLET ORAL at 19:02

## 2025-06-18 RX ADMIN — POLYETHYLENE GLYCOL 3350 17 G: 17 POWDER, FOR SOLUTION ORAL at 19:02

## 2025-06-18 RX ADMIN — BENZONATATE 100 MG: 100 CAPSULE ORAL at 18:13

## 2025-06-18 RX ADMIN — LACTULOSE 20 G: 20 SOLUTION ORAL at 20:34

## 2025-06-18 RX ADMIN — DIAZEPAM 2.5 MG: 5 INJECTION, SOLUTION INTRAMUSCULAR; INTRAVENOUS at 22:31

## 2025-06-18 RX ADMIN — BUSPIRONE HYDROCHLORIDE 5 MG: 5 TABLET ORAL at 10:06

## 2025-06-18 RX ADMIN — BUSPIRONE HYDROCHLORIDE 5 MG: 5 TABLET ORAL at 14:23

## 2025-06-18 RX ADMIN — QUETIAPINE FUMARATE 25 MG: 25 TABLET ORAL at 20:33

## 2025-06-18 RX ADMIN — CLOPIDOGREL BISULFATE 75 MG: 75 TABLET, FILM COATED ORAL at 19:02

## 2025-06-18 RX ADMIN — ACETAMINOPHEN 650 MG: 325 TABLET ORAL at 18:18

## 2025-06-18 RX ADMIN — LORAZEPAM 0.5 MG: 1 TABLET ORAL at 04:51

## 2025-06-18 RX ADMIN — GADOTERIDOL 12 ML: 279.3 INJECTION, SOLUTION INTRAVENOUS at 23:31

## 2025-06-18 RX ADMIN — QUETIAPINE FUMARATE 25 MG: 25 TABLET ORAL at 10:06

## 2025-06-18 ASSESSMENT — PAIN DESCRIPTION - LOCATION: LOCATION: ABDOMEN

## 2025-06-18 NOTE — PROGRESS NOTES
[address], [facility] [place] [address]  Phone: [phone]   [phone]    [email or web address]     Nephrology Progress Note    Patient Name : Karlee Leon      : 1950     MRN : [id number]  Date of Admission : 2025  Date of Servive : 25    CC:  Follow up for BALA       Assessment and Plan   BALA on CKD  - pre renal/ dehydration   - No obstruction from pelvic mass on CT  - Cr back to baseline   - Stopped IVF. Labs pending      Ckd 3B   - Baseline Cr 1.5 mg/dl      Large Pelvic mass     Constipation   HTN   Dementia   DM-II   UC  GERD      Interval History:  Seen and examined. Incomprehensible speech, confused, not eating and drinking  No labs today     Review of Systems: Review of systems not obtained due to patient factors.    Current Medications:   Current Facility-Administered Medications   Medication Dose Route Frequency    atorvastatin (LIPITOR) tablet 80 mg  80 mg Oral Nightly    busPIRone (BUSPAR) tablet 5 mg  5 mg Oral TID    famotidine (PEPCID) tablet 20 mg  20 mg Oral Daily    LORazepam (ATIVAN) tablet 0.5 mg  0.5 mg Oral Q6H PRN    QUEtiapine (SEROQUEL) tablet 25 mg  25 mg Oral BID    sodium chloride flush 0.9 % injection 5-40 mL  5-40 mL IntraVENous 2 times per day    sodium chloride flush 0.9 % injection 5-40 mL  5-40 mL IntraVENous PRN    0.9 % sodium chloride infusion   IntraVENous PRN    potassium chloride (KLOR-CON) extended release tablet 40 mEq  40 mEq Oral PRN    Or    potassium bicarb-citric acid (EFFER-K) effervescent tablet 40 mEq  40 mEq Oral PRN    Or    potassium chloride 10 mEq/100 mL IVPB (Peripheral Line)  10 mEq IntraVENous PRN    magnesium sulfate 2000 mg in 50 mL IVPB premix  2,000 mg IntraVENous PRN    ondansetron (ZOFRAN-ODT) disintegrating tablet 4 mg  4 mg Oral Q8H PRN    Or    ondansetron (ZOFRAN) injection 4 mg  4 mg IntraVENous Q6H PRN    polyethylene glycol (GLYCOLAX) packet 17 g  17 g Oral

## 2025-06-18 NOTE — CARE COORDINATION
Transition of Care Plan:    RUR: 20%  Prior Level of Functioning:  w/c bound, memory care unit at [facility]  Disposition: Return to [facility] when stable  RHEA: 72 hours  Follow up appointments: pcp/specialist   DME needed: n/a  Transportation at discharge: stretcher  IM/IMM Medicare/ letter given: will need prior to d/c  Caregiver Contact:   Primary Decision Maker: Fabien Leon - Other - [phone]    Secondary Decision Maker: LeonRon - Kelly - [phone]    Discharge Caregiver contacted prior to discharge? yes  Care Conference needed? no  Barriers to discharge: medical stability.       Monica spoke with, Mouna BILLY at the [facility] [phone] ext. [phone]. She requested for cm to fax clinicals to her at [phone].  At their facility, patient has a care companion, Patt [phone]. She cares for the patient M-Friday 7am-3:30 p.m.  Patient also has caregivers with [facility] from 4pm-8pm Mon-Fri and also on Sat. And Sun. For 4 hours. Patient ex  recently moved to [place], but is involved with patients care. The plan is for the patient to move to [place], [place] with her sister but may be delayed to hospital admission. Monica will continue to follow, and updated Anabelle about her return to the [facility] Memory care unit.    145Mckenna- MONICA received call from [facility]. Patient is open to their agency for pt/sn. KATALINA orders sent to the agency.     [facility]  Care Management Department  Ph:[phone]

## 2025-06-18 NOTE — PLAN OF CARE
Problem: Pain  Goal: Verbalizes/displays adequate comfort level or baseline comfort level  6/18/2025 1130 by Luana Weldon RN  Outcome: Progressing  6/18/2025 0716 by Shan Dorsey RN  Outcome: Progressing     Problem: Chronic Conditions and Co-morbidities  Goal: Patient's chronic conditions and co-morbidity symptoms are monitored and maintained or improved  6/18/2025 1130 by Luana Weldon RN  Outcome: Progressing  6/18/2025 0716 by Shan Dorsey RN  Outcome: Progressing     Problem: Skin/Tissue Integrity  Goal: Skin integrity remains intact  Description: 1.  Monitor for areas of redness and/or skin breakdown  2.  Assess vascular access sites hourly  3.  Every 4-6 hours minimum:  Change oxygen saturation probe site  4.  Every 4-6 hours:  If on nasal continuous positive airway pressure, respiratory therapy assess nares and determine need for appliance change or resting period  6/18/2025 1130 by Luana Weldon RN  Outcome: Progressing  6/18/2025 0716 by Shan Dorsey RN  Outcome: Progressing     Problem: Safety - Adult  Goal: Free from fall injury  6/18/2025 1130 by Luana Weldon RN  Outcome: Progressing  6/18/2025 0716 by Shan Dorsey RN  Outcome: Progressing

## 2025-06-18 NOTE — PROGRESS NOTES
cyanosis or edema  Pulses: 2+ and symmetric  Skin: normal color and temperature  Lymph nodes: Cervical, supraclavicular, and axillary nodes normal.  Neurologic: Grossly normal    Data Review      Recent Results (from the past 24 hours)   POCT Glucose    Collection Time: 06/17/25  4:59 PM   Result Value Ref Range    POC Glucose 133 (H) 65 - 117 mg/dL    Performed by: VALENTINO Wright    POCT Glucose    Collection Time: 06/17/25  8:42 PM   Result Value Ref Range    POC Glucose 162 (H) 65 - 117 mg/dL    Performed by: CHANEL Carlos    POCT Glucose    Collection Time: 06/18/25  8:17 AM   Result Value Ref Range    POC Glucose 168 (H) 65 - 117 mg/dL    Performed by: REJI Dutton    POCT Glucose    Collection Time: 06/18/25 11:17 AM   Result Value Ref Range    POC Glucose 150 (H) 65 - 117 mg/dL    Performed by: LOULOU Craft RN        Imaging    CT CHEST WO CONTRAST  ACC#: [id number]     INDICATION: Patient presented with pelvic mass, evaluate for metastatic disease  in the chest     COMPARISON: None.      CONTRAST:  None.     TECHNIQUE: Axial images were obtained through the chest.  Coronal and sagittal  reformats were generated.  CT dose reduction was achieved through use of a  standardized protocol tailored for this examination and automatic exposure  control for dose modulation.     FINDINGS:  Coronary artery calcium: present     The thoracic aorta is normal in caliber. There is mild atherosclerotic  calcification of the aorta. There is a trace pericardial effusion. There is no  significant lymphadenopathy. Small hiatal hernia is noted.     There is no pleural effusion, pneumothorax, or airspace disease.     There are no acute fractures or aggressive appearing bony abnormalities. There  are multilevel degenerative changes of the spine.        IMPRESSION:  1.  No evidence of metastatic disease.  2.  Small hiatal hernia.     CT ABDOMEN AND PELVIS WITHOUT CONTRAST. 6/16/2025 12:21 PM      INDICATION: No bowel  the uterus.  Her imaging findings are most consistent with most likely uterine fibroids.  Alternatively it could be the an ovarian mass, but I believe this is less likely.  She has imaging from 12/10/2024 which demonstrates the same findings as the current CT scan noted on admission.  The size is exactly the same, as is the appearance.  This supports most likely a benign process given its stability.  Counseled Mr. Leon that I would recommend observation versus surgical resection.  While she does have constipation, and the mass may impact her constipation, it has not changed in size and should not result in an obstruction.  The patient will be moving to [place], which seems appropriate given that she will have more care there with her sister.  I have recommended the patient have some follow-up there for observation.  Mr. Leon is in agreement and was thankful for the phone call. All questions and concerns were addressed with the patient and she is comfortable with the plan.  Gynecologic oncology will continue to follow from afar.     An electronic signature was used to authenticate this note.      Kyle Kruger MD     Please note that portions of this dictation were completed with Dragon, the Pro[employer] voice recognition software.  Quite often unanticipated grammatical, syntax, homophones, and other interpretive errors are inadvertently transcribed by the computer software.  Please disregard these errors.  Please excuse any errors that have escaped final proofreading.

## 2025-06-18 NOTE — PROGRESS NOTES
[facility] Adult  Hospitalist Group                                                                                          Hospitalist Progress Note  Baudilio Langley MD  Office Phone: [phone]        Date of Service:  2025  NAME:  Karlee Leon  :  1950  MRN:  [id number]       Admission Summary:   Karlee Leon is a 75 y.o. female with past medical history significant for type 2 diabetes, CKD, dementia, hypertension, ulcerative colitis, dyslipidemia, congestive heart failure with preserved ejection fraction presented at [facility] emergency room at [facility] with constipation and fatigue.  It was reported that the patient symptoms have been going on for about 2 months.  Patient is unable to provide history because of her underlying dementia.  It was also reported that the patient is more confused than usual.  There was no history of a fever, rigors or chills reported.  Also no history of a fall was reported.  Patient was last admitted to this hospital in 2024, patient was admitted for evaluation and treatment of change in mental status attributed to metabolic events.  CT scan of the abdomen and pelvis done in the emergency room without contrast shows large pelvic mass with broad differentials including ovarian carcinoma, right gluteus hematoma was also reported.       Interval history / Subjective:   Follow up Pelvic mass  Saw the patient earlier  Comfortably laying in bed  Per RN overnight good bowel movements  Abd slightly distended but non tender     Assessment & Plan:     Pelvic mass POA  -Appreciate discussion with Dr Kruger, likely fibroid  -[facility]  [facility] to [place] and take care of it there    Constipation : continue bowel regimen  Right gluteus hematoma POA: General Surgery signed off  Type 2 diabetes with hyperglycemia POA: SSI  BALA: now resolved. Appreciate          Intake/Output Summary (Last 24 hours) at 6/18/2025 1844  Last data filed at 6/18/2025 1826  Gross per 24 hour   Intake --   Output 400 ml   Net -400 ml        Physical Examination:     I had a face to face encounter with this patient and independently examined them on 6/18/2025 as outlined below:          General : alert , awake, no acute distress,   HEENT: PEERL, EOMI, moist mucus membrane, TM clear  Neck: supple, no JVD, no meningeal signs  Chest: Clear to auscultation bilaterally   CVS: S1 S2 heard, Capillary refill less than 2 seconds  Abd: soft/ non tender, non distended, BS physiological,   Ext: no clubbing, no cyanosis, no edema, brisk 2+ DP pulses  Neuro/Psych: non focal  Skin: warm     Data Review:    Review and/or order of clinical lab test      I have personally and independently reviewed all pertinent labs, diagnostic studies, imaging, and have provided independent interpretation of the same.     Labs:     Recent Labs     06/16/25 1138 06/17/25  0423   WBC 11.1* 10.9   HGB 11.1* 10.4*   HCT 35.0 34.0*    388     Recent Labs     06/16/25 1138 06/17/25  0423    144   K 4.6 3.8   * 117*   CO2 27 22   BUN 48* 39*   MG  --  2.2   PHOS  --  3.1     Recent Labs     06/16/25 1138 06/17/25  0423   ALT 17 17   GLOB 4.0 3.6     No results for input(s): \"INR\", \"APTT\" in the last 72 hours.    Invalid input(s): \"PTP\"   No results for input(s): \"TIBC\" in the last 72 hours.    Invalid input(s): \"FE\", \"PSAT\", \"FERR\"   No results found for: \"RBCF\"   No results for input(s): \"PH\", \"PCO2\", \"PO2\" in the last 72 hours.  No results for input(s): \"CPK\" in the last 72 hours.    Invalid input(s): \"CPKMB\", \"CKNDX\", \"TROIQ\"  Lab Results   Component Value Date/Time    CHOL 194 03/01/2022 12:37 AM    HDL 53 03/01/2022 12:37 AM    .4 03/01/2022 12:37 AM     No results found for: \"GLUCPOC\"  [unfilled]    Notes reviewed from all clinical/nonclinical/nursing services involved in patient's clinical care.

## 2025-06-18 NOTE — PROGRESS NOTES
Depression: Not at risk (11/10/2020)    Received from [facility]  (prior to 6/17/2023)    PHQ-2     Total Score PHQ 2: 0   Housing Stability: Not on file   Interpersonal Safety: Not At Risk (9/9/2024)    Interpersonal Safety Domain Source: IP Abuse Screening     Physical abuse: Denies     Verbal abuse: Denies     Emotional abuse: Denies     Financial abuse: Denies     Sexual abuse: Denies   Utilities: Not on file       Review of Systems:   Pertinent items are noted in HPI.       Vital Signs:    Last 24hrs VS reviewed since prior progress note. Most recent are:  Vitals:    06/17/25 2200   BP:    Pulse: (!) 131   Resp:    Temp:    SpO2:          Intake/Output Summary (Last 24 hours) at 6/17/2025 2330  Last data filed at 6/17/2025 1236  Gross per 24 hour   Intake --   Output 175 ml   Net -175 ml        Physical Examination:     I had a face to face encounter with this patient and independently examined them on 6/17/2025 as outlined below:          General : alert , awake, no acute distress,   HEENT: PEERL, EOMI, moist mucus membrane, TM clear  Neck: supple, no JVD, no meningeal signs  Chest: Clear to auscultation bilaterally   CVS: S1 S2 heard, Capillary refill less than 2 seconds  Abd: soft/ non tender, non distended, BS physiological,   Ext: no clubbing, no cyanosis, no edema, brisk 2+ DP pulses  Neuro/Psych: non focal  Skin: warm     Data Review:    Review and/or order of clinical lab test      I have personally and independently reviewed all pertinent labs, diagnostic studies, imaging, and have provided independent interpretation of the same.     Labs:     Recent Labs     06/16/25 1138 06/17/25 0423   WBC 11.1* 10.9   HGB 11.1* 10.4*   HCT 35.0 34.0*    388     Recent Labs     06/16/25 1138 06/17/25 0423    144   K 4.6 3.8   * 117*   CO2 27 22   BUN 48* 39*   MG  --  2.2   PHOS  --  3.1     Recent Labs     06/16/25 1138 06/17/25  0423   ALT 17 17   GLOB 4.0 3.6     No  10 mEq IntraVENous PRN    magnesium sulfate 2000 mg in 50 mL IVPB premix  2,000 mg IntraVENous PRN    ondansetron (ZOFRAN-ODT) disintegrating tablet 4 mg  4 mg Oral Q8H PRN    Or    ondansetron (ZOFRAN) injection 4 mg  4 mg IntraVENous Q6H PRN    polyethylene glycol (GLYCOLAX) packet 17 g  17 g Oral Daily PRN    acetaminophen (TYLENOL) tablet 650 mg  650 mg Oral Q6H PRN    Or    acetaminophen (TYLENOL) suppository 650 mg  650 mg Rectal Q6H PRN    insulin lispro (HUMALOG,ADMELOG) injection vial 0-8 Units  0-8 Units SubCUTAneous 4x Daily AC & HS    glucose chewable tablet 16 g  4 tablet Oral PRN    dextrose bolus 10% 125 mL  125 mL IntraVENous PRN    Or    dextrose bolus 10% 250 mL  250 mL IntraVENous PRN    glucagon injection 1 mg  1 mg SubCUTAneous PRN    dextrose 10 % infusion   IntraVENous Continuous PRN    0.9 % sodium chloride infusion   IntraVENous Continuous    lactulose (CHRONULAC) 10 GM/15ML solution 20 g  20 g Oral BID    HYDROmorphone (DILAUDID) injection 0.5 mg  0.5 mg IntraVENous Q4H PRN    oxyCODONE (ROXICODONE) immediate release tablet 5 mg  5 mg Oral Q6H PRN     ______________________________________________________________________  EXPECTED LENGTH OF STAY: 5  ACTUAL LENGTH OF STAY:          1                 Baudilio Langley MD

## 2025-06-19 VITALS
SYSTOLIC BLOOD PRESSURE: 113 MMHG | TEMPERATURE: 98.1 F | OXYGEN SATURATION: 96 % | HEART RATE: 85 BPM | WEIGHT: 134.7 LBS | DIASTOLIC BLOOD PRESSURE: 73 MMHG | RESPIRATION RATE: 14 BRPM | BODY MASS INDEX: 24.64 KG/M2

## 2025-06-19 PROBLEM — K59.00 CONSTIPATION: Status: ACTIVE | Noted: 2025-06-19

## 2025-06-19 LAB
ANION GAP SERPL CALC-SCNC: 7 MMOL/L (ref 2–12)
BASOPHILS # BLD: 0.11 K/UL (ref 0–0.1)
BASOPHILS NFR BLD: 0.9 % (ref 0–1)
BUN SERPL-MCNC: 29 MG/DL (ref 6–20)
BUN/CREAT SERPL: 18 (ref 12–20)
CALCIUM SERPL-MCNC: 9.9 MG/DL (ref 8.5–10.1)
CHLORIDE SERPL-SCNC: 115 MMOL/L (ref 97–108)
CO2 SERPL-SCNC: 22 MMOL/L (ref 21–32)
CREAT SERPL-MCNC: 1.6 MG/DL (ref 0.55–1.02)
DIFFERENTIAL METHOD BLD: ABNORMAL
EOSINOPHIL # BLD: 0 K/UL (ref 0–0.4)
EOSINOPHIL NFR BLD: 0 % (ref 0–7)
ERYTHROCYTE [DISTWIDTH] IN BLOOD BY AUTOMATED COUNT: 14.6 % (ref 11.5–14.5)
GLUCOSE BLD STRIP.AUTO-MCNC: 137 MG/DL (ref 65–117)
GLUCOSE BLD STRIP.AUTO-MCNC: 181 MG/DL (ref 65–117)
GLUCOSE SERPL-MCNC: 141 MG/DL (ref 65–100)
HCT VFR BLD AUTO: 35.9 % (ref 35–47)
HGB BLD-MCNC: 11.3 G/DL (ref 11.5–16)
IMM GRANULOCYTES # BLD AUTO: 0.07 K/UL (ref 0–0.04)
IMM GRANULOCYTES NFR BLD AUTO: 0.6 % (ref 0–0.5)
LYMPHOCYTES # BLD: 2.86 K/UL (ref 0.8–3.5)
LYMPHOCYTES NFR BLD: 23 % (ref 12–49)
MCH RBC QN AUTO: 30.1 PG (ref 26–34)
MCHC RBC AUTO-ENTMCNC: 31.5 G/DL (ref 30–36.5)
MCV RBC AUTO: 95.7 FL (ref 80–99)
MONOCYTES # BLD: 1.2 K/UL (ref 0–1)
MONOCYTES NFR BLD: 9.7 % (ref 5–13)
NEUTS SEG # BLD: 8.19 K/UL (ref 1.8–8)
NEUTS SEG NFR BLD: 65.8 % (ref 32–75)
NRBC # BLD: 0 K/UL (ref 0–0.01)
NRBC BLD-RTO: 0 PER 100 WBC
PLATELET # BLD AUTO: 447 K/UL (ref 150–400)
PMV BLD AUTO: 10 FL (ref 8.9–12.9)
POTASSIUM SERPL-SCNC: 4.5 MMOL/L (ref 3.5–5.1)
RBC # BLD AUTO: 3.75 M/UL (ref 3.8–5.2)
SERVICE CMNT-IMP: ABNORMAL
SERVICE CMNT-IMP: ABNORMAL
SODIUM SERPL-SCNC: 144 MMOL/L (ref 136–145)
WBC # BLD AUTO: 12.4 K/UL (ref 3.6–11)

## 2025-06-19 PROCEDURE — 6360000002 HC RX W HCPCS: Performed by: HOSPITALIST

## 2025-06-19 PROCEDURE — 6370000000 HC RX 637 (ALT 250 FOR IP): Performed by: INTERNAL MEDICINE

## 2025-06-19 PROCEDURE — 2500000003 HC RX 250 WO HCPCS: Performed by: INTERNAL MEDICINE

## 2025-06-19 PROCEDURE — 82962 GLUCOSE BLOOD TEST: CPT

## 2025-06-19 PROCEDURE — 6370000000 HC RX 637 (ALT 250 FOR IP): Performed by: HOSPITALIST

## 2025-06-19 PROCEDURE — 80048 BASIC METABOLIC PNL TOTAL CA: CPT

## 2025-06-19 PROCEDURE — 85025 COMPLETE CBC W/AUTO DIFF WBC: CPT

## 2025-06-19 PROCEDURE — 99231 SBSQ HOSP IP/OBS SF/LOW 25: CPT | Performed by: PHYSICIAN ASSISTANT

## 2025-06-19 RX ORDER — LACTULOSE 10 G/15ML
20 SOLUTION ORAL NIGHTLY
Qty: 1 EACH | Refills: 0 | Status: SHIPPED | OUTPATIENT
Start: 2025-06-19

## 2025-06-19 RX ADMIN — POLYETHYLENE GLYCOL 3350 17 G: 17 POWDER, FOR SOLUTION ORAL at 10:45

## 2025-06-19 RX ADMIN — QUETIAPINE FUMARATE 25 MG: 25 TABLET ORAL at 10:43

## 2025-06-19 RX ADMIN — SODIUM CHLORIDE, PRESERVATIVE FREE 10 ML: 5 INJECTION INTRAVENOUS at 10:52

## 2025-06-19 RX ADMIN — FAMOTIDINE 20 MG: 20 TABLET, FILM COATED ORAL at 10:45

## 2025-06-19 RX ADMIN — LACTULOSE 20 G: 20 SOLUTION ORAL at 10:45

## 2025-06-19 RX ADMIN — CLOPIDOGREL BISULFATE 75 MG: 75 TABLET, FILM COATED ORAL at 10:43

## 2025-06-19 RX ADMIN — SODIUM CHLORIDE, PRESERVATIVE FREE 10 ML: 5 INJECTION INTRAVENOUS at 10:53

## 2025-06-19 RX ADMIN — CLONIDINE HYDROCHLORIDE 0.1 MG: 0.1 TABLET ORAL at 10:45

## 2025-06-19 RX ADMIN — SENNOSIDES AND DOCUSATE SODIUM 1 TABLET: 8.6; 5 TABLET ORAL at 10:43

## 2025-06-19 RX ADMIN — METOPROLOL SUCCINATE 75 MG: 25 TABLET, EXTENDED RELEASE ORAL at 10:43

## 2025-06-19 RX ADMIN — BUSPIRONE HYDROCHLORIDE 5 MG: 5 TABLET ORAL at 10:43

## 2025-06-19 RX ADMIN — HYDRALAZINE HYDROCHLORIDE 10 MG: 20 INJECTION INTRAMUSCULAR; INTRAVENOUS at 02:13

## 2025-06-19 NOTE — CARE COORDINATION
JONO:    This patient will return to [facility] via [facility] transport at 12pm. CM notified [facility] of discharge today. Cm faxed dc summary to facility at [phone]. RN can call report to [phone] ext [phone].     CM notified   Primary Decision Maker: Fabien Leon - Other - [phone]    Secondary Decision Maker: Ron Leon - [phone]      Patient verbalized understanding and gave permission for possible discharge within 4 hours of receiving IMM.    [facility]  Care Management Department  Ph:[phone]

## 2025-06-19 NOTE — DISCHARGE INSTRUCTIONS
Discharge SNF/Rehab Instructions/LTAC       PATIENT ID: Karlee Leon  MRN: [id number]   YOB: 1950    DATE OF ADMISSION: [unfilled]    DATE OF DISCHARGE: 6/19/2025    PRIMARY CARE PROVIDER: @PCP@       ATTENDING PHYSICIAN: [unfilled]  DISCHARGING PROVIDER: Baudilio Langley MD     To contact this individual call [phone] and ask the  to page.   If unavailable ask to be transferred the Adult Hospitalist Department.    CONSULTATIONS: [unfilled]    PROCEDURES/SURGERIES: * No surgery found *    ADMITTING DIAGNOSES & HOSPITAL COURSE:   Pelvic mass POA  -Appreciate discussion with Dr Kruger, likely fibroid  -[facility]ru[facility]  -[facility] [place] and take care of it there    Constipation : continue bowel regimen  Right gluteus hematoma POA: General Surgery signed off  Type 2 diabetes with hyperglycemia POA: SSI  BALA: now resolved. Appreciate nephrology  Dementia with behavioral disturbance POA: continue Seroquel and BuSpar  Essential hypertension POA: Will restart home meds  Dyslipidemia POA: Will continue Lipitor.  Bilateral leg swelling POA: dopplers negative for DVT      PENDING TEST RESULTS:   At the time of discharge the following test results are still pending: none    FOLLOW UP APPOINTMENTS:    PCP  GYN onc       ADDITIONAL CARE RECOMMENDATIONS: as above    DIET: regular diet    ACTIVITY: activity as tolerated    DISCHARGE MEDICATIONS:   See Medication Reconciliation Form      NOTIFY YOUR PHYSICIAN FOR ANY OF THE FOLLOWING:   Fever over 101 degrees for 24 hours.   Chest pain, shortness of breath, fever, chills, nausea, vomiting, diarrhea, change in mentation, falling, weakness, bleeding. Severe pain or pain not relieved by medications.  Or, any other signs or symptoms that you may have questions about.    DISPOSITION:    Home With:   OT  PT  HH  RN      x SNF/Inpatient Rehab/LTAC    Independent/assisted living    Hospice    Other:

## 2025-06-19 NOTE — PROGRESS NOTES
Physician Progress Note      PATIENT:               RADHA LATHAM  [facility] #:                  [id number]  :                       1950  ADMIT DATE:       2025 11:11 AM  DISCH DATE:  RESPONDING  PROVIDER #:        Baudilio Langley MD          QUERY TEXT:    Acute Kidney Injury is documented in the medical record by Renal in CN dated   . Please provide additional clinical indicators per KDIGO supportive of   your documentation. Or please document if the diagnosis of BALA has been ruled   out after study.    The clinical indicators include:  Cr 1.83-1.51-1.60    Evelia  : BALA on CKD  CKD 3B  -Baseline Cr 1.5 mg/dl\"  Options provided:  -- Acute kidney injury evidenced by, Please document evidence as well as a   numerical baseline creatinine, if known.  -- Acute kidney injury ruled out after study  -- Other - I will add my own diagnosis  -- Disagree - Not applicable / Not valid  -- Disagree - Clinically unable to determine / Unknown  -- Refer to Clinical Documentation Reviewer    PROVIDER RESPONSE TEXT:    Acute kidney injury was ruled out after study.    Query created by: Lorena Harkins on 2025 11:21 AM      Electronically signed by:  Baudilio Langley MD 2025 12:49 PM

## 2025-06-19 NOTE — PROGRESS NOTES
[address], [facility] [place] [address]  Phone: [phone]   [phone]    [email or web address]     Nephrology Progress Note    Patient Name : Karlee Leon      : 1950     MRN : [id number]  Date of Admission : 2025  Date of Servive : 25    CC:  Follow up for BALA       Assessment and Plan   BALA on CKD  - pre renal/ dehydration   - No obstruction from pelvic mass on CT  - Cr back to baseline   - Signing off     Ckd 3B   - Baseline Cr 1.5 mg/dl      Large Pelvic mass     Constipation   HTN   Dementia   DM-II   UC  GERD      Interval History:  Patient seen and examined.  GYN oncology evaluation and recommendations noted.  Intake remains poor.  Remains on IVF.  Renal function at baseline.    Review of Systems: Review of systems not obtained due to patient factors.    Current Medications:   Current Facility-Administered Medications   Medication Dose Route Frequency    benzonatate (TESSALON) capsule 100 mg  100 mg Oral TID PRN    diazePAM (VALIUM) injection 2.5 mg  2.5 mg IntraVENous PRN    cloNIDine (CATAPRES) tablet 0.1 mg  0.1 mg Oral BID    clopidogrel (PLAVIX) tablet 75 mg  75 mg Oral Daily    metoprolol succinate (TOPROL XL) extended release tablet 75 mg  75 mg Oral Daily    hydrALAZINE (APRESOLINE) injection 10 mg  10 mg IntraVENous Q6H PRN    polyethylene glycol (GLYCOLAX) packet 17 g  17 g Oral Daily    sennosides-docusate sodium (SENOKOT-S) 8.6-50 MG tablet 1 tablet  1 tablet Oral Daily    traZODone (DESYREL) tablet 25 mg  25 mg Oral Q6H PRN    sodium chloride flush 0.9 % injection 5-40 mL  5-40 mL IntraVENous BID    atorvastatin (LIPITOR) tablet 80 mg  80 mg Oral Nightly    busPIRone (BUSPAR) tablet 5 mg  5 mg Oral TID    famotidine (PEPCID) tablet 20 mg  20 mg Oral Daily    LORazepam (ATIVAN) tablet 0.5 mg  0.5 mg Oral Q6H PRN    QUEtiapine (SEROQUEL) tablet 25 mg  25 mg Oral BID    sodium chloride flush 0.9 % injection 5-40  mL  5-40 mL IntraVENous 2 times per day    sodium chloride flush 0.9 % injection 5-40 mL  5-40 mL IntraVENous PRN    0.9 % sodium chloride infusion   IntraVENous PRN    potassium chloride (KLOR-CON) extended release tablet 40 mEq  40 mEq Oral PRN    Or    potassium bicarb-citric acid (EFFER-K) effervescent tablet 40 mEq  40 mEq Oral PRN    Or    potassium chloride 10 mEq/100 mL IVPB (Peripheral Line)  10 mEq IntraVENous PRN    magnesium sulfate 2000 mg in 50 mL IVPB premix  2,000 mg IntraVENous PRN    ondansetron (ZOFRAN-ODT) disintegrating tablet 4 mg  4 mg Oral Q8H PRN    Or    ondansetron (ZOFRAN) injection 4 mg  4 mg IntraVENous Q6H PRN    polyethylene glycol (GLYCOLAX) packet 17 g  17 g Oral Daily PRN    acetaminophen (TYLENOL) tablet 650 mg  650 mg Oral Q6H PRN    Or    acetaminophen (TYLENOL) suppository 650 mg  650 mg Rectal Q6H PRN    insulin lispro (HUMALOG,ADMELOG) injection vial 0-8 Units  0-8 Units SubCUTAneous 4x Daily AC & HS    glucose chewable tablet 16 g  4 tablet Oral PRN    dextrose bolus 10% 125 mL  125 mL IntraVENous PRN    Or    dextrose bolus 10% 250 mL  250 mL IntraVENous PRN    glucagon injection 1 mg  1 mg SubCUTAneous PRN    dextrose 10 % infusion   IntraVENous Continuous PRN    lactulose (CHRONULAC) 10 GM/15ML solution 20 g  20 g Oral BID    HYDROmorphone (DILAUDID) injection 0.5 mg  0.5 mg IntraVENous Q4H PRN    oxyCODONE (ROXICODONE) immediate release tablet 5 mg  5 mg Oral Q6H PRN      No Known Allergies    Objective:  Vitals:    Vitals:    06/19/25 0406 06/19/25 0557 06/19/25 0616 06/19/25 1000   BP:  (!) 144/96     Pulse: 91 (!) 115 (!) 112 (!) 128   Resp:  14     Temp:  98.2 °F (36.8 °C)     TempSrc:  Axillary     SpO2:  98%     Weight:           Intake and Output:  No intake/output data recorded.  06/17 1901 - 06/19 0700  In: 10 [I.V.:10]  Out: 600 [Urine:600]    Physical Examination:  General: Unable to comprehend speech   Neck:  Supple, no mass  Resp:  Lungs CTA B/L, no wheezing

## 2025-06-19 NOTE — PROGRESS NOTES
instabilities of lumbar region    Dizziness    Acute encephalopathy    Altered mental status    Troponin I above reference range    Primary hypertension    Severe malnutrition    Chronic heart failure with preserved ejection fraction (HCC)    Moderate episode of recurrent major depressive disorder (HCC)    Uncontrolled type 2 diabetes mellitus with hyperglycemia (HCC)    Stage 3a chronic kidney disease (HCC)    Pelvic mass       Past Medical History:   Diagnosis Date    Anxiety     Arthritis     Behavioral disturbance due to multi-infarct dementia (HCC)     Cerebral infarction (HCC)     Chronic pain     CKD (chronic kidney disease)     Depression     Diabetes (HCC)     Falls     GERD (gastroesophageal reflux disease)     Hypertension     Hypertensive heart disease     Ill-defined condition     ucerative colitis    Type 2 diabetes mellitus (HCC)     Ulcerative colitis (HCC)     Vertigo       Past Surgical History:   Procedure Laterality Date    GI      colonoscopy    ORTHOPEDIC SURGERY Right 2014    FOOT    OTHER SURGICAL HISTORY      finger surgery, foot surgery      Social History     Tobacco Use    Smoking status: Never     Passive exposure: Never    Smokeless tobacco: Never   Substance Use Topics    Alcohol use: No      Family History   Problem Relation Age of Onset    Hearing Impairment Brother     Heart Disease Mother     Diabetes Father     Diabetes Mother       Current Facility-Administered Medications   Medication Dose Route Frequency    benzonatate (TESSALON) capsule 100 mg  100 mg Oral TID PRN    diazePAM (VALIUM) injection 2.5 mg  2.5 mg IntraVENous PRN    cloNIDine (CATAPRES) tablet 0.1 mg  0.1 mg Oral BID    clopidogrel (PLAVIX) tablet 75 mg  75 mg Oral Daily    metoprolol succinate (TOPROL XL) extended release tablet 75 mg  75 mg Oral Daily    hydrALAZINE (APRESOLINE) injection 10 mg  10 mg IntraVENous Q6H PRN    polyethylene glycol (GLYCOLAX) packet 17 g  17 g Oral Daily    sennosides-docusate sodium  endometrial  carcinoma, proteinaceous cystic ovarian neoplasm, and ovarian carcinoma. Given  GFR <30 and scatter artifact from left hip replacement, MRI pelvis with and  without contrast is recommended for further evaluation.  2. Constipation.  3. Right gluteus and subcutaneous contusion and intramuscular hematoma.       IMPRESSION/PLAN:    Patient Active Problem List   Diagnosis    BALA (acute kidney injury)    CVA (cerebral vascular accident) (HCC)    Spinal instabilities of lumbar region    Dizziness    Acute encephalopathy    Altered mental status    Troponin I above reference range    Primary hypertension    Severe malnutrition    Chronic heart failure with preserved ejection fraction (HCC)    Moderate episode of recurrent major depressive disorder (HCC)    Uncontrolled type 2 diabetes mellitus with hyperglycemia (HCC)    Stage 3a chronic kidney disease (HCC)    Pelvic mass       I reviewed with Karlee Leon her medical records, physical exam, and review of symptoms.     75 y.o. female with large pelvic mass found on CT measuring approximately 75y02p0 cm. Pt had CT on 12/10/24 that showed a similar mass. Mass suspicious for malignancy of gyn origin and with its size could be contributing to patient's constipation due to external compression. At bedside, pt appears to be resting comfortably and not in pain, even with abdominal exam. She does follow commands and attempts to speak during exam, but speech is not intelligible. No urgent or emergent surgical intervention is indicated at this time.     Dr. Kruger discussed patient and her diagnosis with patient's MPOA, Fabien Leon.  Pelvic mass was present on CT scan has been stable since at least December 2024.  Suspect benign etiology.  No need for surgical intervention.  Plan is for patient to move to [place].  Thank you for this consult.      Signed By: Aicha Quinones PA-C     6/19/2025/10:07 AM

## 2025-06-19 NOTE — DISCHARGE SUMMARY
Discharge Summary       PATIENT ID: Karlee Leon  MRN: [id number]   YOB: 1950    DATE OF ADMISSION: 6/16/2025 11:11 AM    DATE OF DISCHARGE: 6/19/2025   PRIMARY CARE PROVIDER: Terrence Trejo MD     ATTENDING PHYSICIAN: Dr Baudilio Langley  DISCHARGING PROVIDER: Baudilio Langley MD    To contact this individual call [phone] and ask the  to page.  If unavailable ask to be transferred the Adult Hospitalist Department.    CONSULTATIONS: IP CONSULT TO GYNECOLOGIC ONCOLOGY  IP CONSULT TO GENERAL SURGERY  IP CONSULT TO NEPHROLOGY  IP CONSULT HOME HEALTH    PROCEDURES/SURGERIES: * No surgery found *    ADMITTING DIAGNOSES & HOSPITAL COURSE:   Pelvic mass POA  -Appreciate discussion with Dr Kruger, likely fibroid  -Dr Kruger will discuss with [facility] the plan  -[facility] to [place] the patient to [place] and take care of it there    Constipation : continue bowel regimen  Right gluteus hematoma POA: General Surgery signed off  Type 2 diabetes with hyperglycemia POA: SSI  BALA: now resolved. Appreciate nephrology  Dementia with behavioral disturbance POA: continue Seroquel and BuSpar  Essential hypertension POA: Will restart home meds  Dyslipidemia POA: Will continue Lipitor.  Bilateral leg swelling POA: dopplers negative for DVT      PENDING TEST RESULTS:   At the time of discharge the following test results are still pending: none    FOLLOW UP APPOINTMENTS:    PCP  GYN onc       ADDITIONAL CARE RECOMMENDATIONS: as above    DIET: regular diet    ACTIVITY: activity as tolerated    DISCHARGE MEDICATIONS:   See Medication Reconciliation Form      NOTIFY YOUR PHYSICIAN FOR ANY OF THE FOLLOWING:   Fever over 101 degrees for 24 hours.   Chest pain, shortness of breath, fever, chills, nausea, vomiting, diarrhea, change in mentation, falling, weakness, bleeding. Severe pain or pain not relieved by medications.  Or, any other signs or symptoms that you may have questions about.    DISPOSITION:    Home  5000 UNIT/ML injection     hydrALAZINE 50 MG tablet  Commonly known as: APRESOLINE     midodrine 2.5 MG tablet  Commonly known as: PROAMATINE     OLANZapine zydis 5 MG disintegrating tablet  Commonly known as: ZYPREXA               Where to Get Your Medications        These medications were sent to [facility] [address] - P [phone] - F [phone] [address] [place] [id number]      Phone: [phone]   lactulose 10 GM/15ML solution           NOTIFY YOUR PHYSICIAN FOR ANY OF THE FOLLOWING:   Fever over 101 degrees for 24 hours.   Chest pain, shortness of breath, fever, chills, nausea, vomiting, diarrhea, change in mentation, falling, weakness, bleeding. Severe pain or pain not relieved by medications.  Or, any other signs or symptoms that you may have questions about.    CHRONIC MEDICAL DIAGNOSES:      Greater than 39 minutes were spent with the patient on counseling and coordination of care    Signed:   Baudilio Langley MD  6/19/2025  9:51 AM

## 2025-06-19 NOTE — PROGRESS NOTES
[facility] Adult  Hospitalist Group                                                                                          Hospitalist Progress Note  Baudilio Langley MD  Office Phone: [phone]        Date of Service:  2025  NAME:  Karlee Leon  :  1950  MRN:  [id number]       Admission Summary:   Karlee Leon is a 75 y.o. female with past medical history significant for type 2 diabetes, CKD, dementia, hypertension, ulcerative colitis, dyslipidemia, congestive heart failure with preserved ejection fraction presented at [facility] emergency room at short [facility] with constipation and fatigue.  It was reported that the patient symptoms have been going on for about 2 months.  Patient is unable to provide history because of her underlying dementia.  It was also reported that the patient is more confused than usual.  There was no history of a fever, rigors or chills reported.  Also no history of a fall was reported.  Patient was last admitted to this hospital in 2024, patient was admitted for evaluation and treatment of change in mental status attributed to metabolic events.  CT scan of the abdomen and pelvis done in the emergency room without contrast shows large pelvic mass with broad differentials including ovarian carcinoma, right gluteus hematoma was also reported.       Interval history / Subjective:   Follow up Pelvic mass  Comfortably laying in bed, no pain  Per RN overnight good bowel movements  No new issues     Assessment & Plan:     Pelvic mass POA  -Appreciate discussion with Dr Kruger, likely fibroid  -[facility] Kru[facility]  -[facility] wants to bring the patient to [place] and take care of it there    Constipation : continue bowel regimen  Right gluteus hematoma POA: General Surgery signed off  Type 2 diabetes with hyperglycemia POA: SSI  BALA: now resolved. Appreciate nephrology  Dementia with behavioral disturbance POA:

## (undated) DEVICE — DRAPE XR C ARM 41X74IN LF --

## (undated) DEVICE — STERILE POLYISOPRENE POWDER-FREE SURGICAL GLOVES WITH EMOLLIENT COATING: Brand: PROTEXIS

## (undated) DEVICE — TOOL 14MH30 LEGEND 14CM 3MM: Brand: MIDAS REX ™

## (undated) DEVICE — MICROMYST APPLICATOR (14CM) 5 PACK - FOR USE WITH FLOW REGULATOR: Brand: MICROMYST

## (undated) DEVICE — SUTURE GORTX SZ 5-0 L30IN NONABSORBABLE L9MM TTC-9 3/8 CIR 6M02A

## (undated) DEVICE — SUTURE VCRL SZ 2-0 L18IN ABSRB UD L26MM CP-2 1/2 CIR REV J762D

## (undated) DEVICE — DURASEAL® DURAL SEALANT SYSTEM 5ML 5 PACK: Brand: DURASEAL®

## (undated) DEVICE — DRAIN KT WND 10FR RND 400ML --

## (undated) DEVICE — PILLOW POS AD L7IN R FOAM HD REST INTUB SLOT DISP

## (undated) DEVICE — SOLUTION IV 250ML 0.9% SOD CHL CLR INJ FLX BG CONT PRT CLSR

## (undated) DEVICE — 1200 GUARD II KIT W/5MM TUBE W/O VAC TUBE: Brand: GUARDIAN

## (undated) DEVICE — SUTURE VCRL SZ 0 L18IN ABSRB VLT L36MM CT-1 1/2 CIR J740D

## (undated) DEVICE — INTENDED FOR TISSUE SEPARATION, AND OTHER PROCEDURES THAT REQUIRE A SHARP SURGICAL BLADE TO PUNCTURE OR CUT.: Brand: BARD-PARKER ® CARBON RIB-BACK BLADES

## (undated) DEVICE — INFECTION CONTROL KIT SYS

## (undated) DEVICE — LAMINECTOMY RICHMOND-LF: Brand: MEDLINE INDUSTRIES, INC.

## (undated) DEVICE — BONE WAX WHITE: Brand: BONE WAX WHITE

## (undated) DEVICE — ASTOUND STANDARD SURGICAL GOWN, XXL: Brand: CONVERTORS

## (undated) DEVICE — PREP SKN PREVAIL 40ML APPL --

## (undated) DEVICE — KENDALL SCD EXPRESS SLEEVES, KNEE LENGTH, MEDIUM: Brand: KENDALL SCD

## (undated) DEVICE — SUTURE MCRYL SZ 4-0 L27IN ABSRB UD L19MM PS-2 1/2 CIR PRIM Y426H

## (undated) DEVICE — CANNULA INJ L2.5IN BLNT TIP 3MM CLR BODY W/ 1 W VLV DLP

## (undated) DEVICE — CODMAN® INTEGRATED BIPOLAR CORD AND TUBING SET FLYING LEADS, ROTARY PUMP: Brand: CODMAN®

## (undated) DEVICE — TRAY CATH OD16FR SIL URIN M STATLOK STBL DEV SURSTP

## (undated) DEVICE — DRAPE C-ARMOUR C-ARM KIT --

## (undated) DEVICE — DEVON™ KNEE AND BODY STRAP 60" X 3" (1.5 M X 7.6 CM): Brand: DEVON

## (undated) DEVICE — CODMAN® SURGICAL PATTIES 1/2" X 1/2" (1.27CM X 1.27CM): Brand: CODMAN®

## (undated) DEVICE — DRAPE,REIN 53X77,STERILE: Brand: MEDLINE

## (undated) DEVICE — X-RAY SPONGES,16 PLY: Brand: DERMACEA

## (undated) DEVICE — GAUZE SPONGES,12 PLY: Brand: CURITY

## (undated) DEVICE — COVER,TABLE,HEAVY DUTY,60"X90",STRL: Brand: MEDLINE

## (undated) DEVICE — FLOSEAL MATRIX IS INDICATED IN SURGICAL PROCEDURES (OTHER THAN IN OPHTHALMIC) AS AN ADJUNCT TO HEMOSTASIS WHEN CONTROL OF BLEEDING BY LIGATURE OR CONVENTIONALPROCEDURES IS INEFFECTIVE OR IMPRACTICAL.: Brand: FLOSEAL HEMOSTATIC MATRIX